# Patient Record
Sex: FEMALE | Race: WHITE | NOT HISPANIC OR LATINO | Employment: FULL TIME | URBAN - METROPOLITAN AREA
[De-identification: names, ages, dates, MRNs, and addresses within clinical notes are randomized per-mention and may not be internally consistent; named-entity substitution may affect disease eponyms.]

---

## 2017-02-08 ENCOUNTER — OFFICE VISIT (OUTPATIENT)
Dept: OBSTETRICS AND GYNECOLOGY | Facility: CLINIC | Age: 47
End: 2017-02-08

## 2017-02-08 VITALS
SYSTOLIC BLOOD PRESSURE: 121 MMHG | BODY MASS INDEX: 34.16 KG/M2 | HEART RATE: 78 BPM | HEIGHT: 62 IN | WEIGHT: 185.6 LBS | DIASTOLIC BLOOD PRESSURE: 81 MMHG

## 2017-02-08 DIAGNOSIS — N64.52 BLOODY DISCHARGE FROM RIGHT NIPPLE: Primary | ICD-10-CM

## 2017-02-08 PROCEDURE — 99213 OFFICE O/P EST LOW 20 MIN: CPT | Performed by: OBSTETRICS & GYNECOLOGY

## 2017-02-08 NOTE — PROGRESS NOTES
Subjective   Mary Banda is a 46 y.o. female    CC: Bloody nipple discharge      History of Present Illness  Pt noticed some blood in the right side of her bra a couple years ago. Pt has been told that she has some cysts on her mammogram. Pt states her right breast is itchy and she is still seeing some bloody discharge. Had mammography and right breast ultrasound in : two benign appearing breast cysts noted.  This has been occurring for about 2 wks.  No pain; and she has felt no masses; breast is not tender.    The following portions of the patient's history were reviewed and updated as appropriate: allergies, current medications, past family history, past medical history, past social history, past surgical history and problem list.    Review of Systems   Constitutional: Negative for fever.   Cardiovascular: Negative for chest pain.   Musculoskeletal: Negative for back pain.       No past medical history on file.  Past Surgical History   Procedure Laterality Date   • Cholecystectomy     • Tubal abdominal ligation     • Oophorectomy Left      OB History      Para Term  AB TAB SAB Ectopic Multiple Living    5 2   2  2   2        Menstrual History:  OB History      Para Term  AB TAB SAB Ectopic Multiple Living    5 2   2  2   2         No LMP recorded. Patient has had an ablation.       Family History   Problem Relation Age of Onset   • Thyroid disease Mother    • Heart disease Mother    • Stroke Father    • Hyperlipidemia Father    • Heart disease Father    • Diabetes Father    • Lupus Sister    • Cancer Maternal Grandmother    • Heart disease Maternal Grandmother    • Heart disease Maternal Grandfather    • Heart disease Paternal Grandmother    • Aneurysm Sister      History   Smoking Status   • Current Every Day Smoker   • Packs/day: 0.50   Smokeless Tobacco   • Never Used     History   Alcohol Use   • Yes     Comment: social       Objective   Physical Exam   Constitutional: She  is oriented to person, place, and time. She appears well-developed and well-nourished.   Pulmonary/Chest: She exhibits no tenderness. Right breast exhibits nipple discharge. Right breast exhibits no inverted nipple, no mass, no skin change and no tenderness. Left breast exhibits no inverted nipple, no mass, no nipple discharge, no skin change and no tenderness. Breasts are symmetrical.   Right nipple discharge today, just barely heme stained.  Collected sample and sent for pap.   Neurological: She is alert and oriented to person, place, and time.   Psychiatric: She has a normal mood and affect. Her behavior is normal. Judgment and thought content normal.   Nursing note and vitals reviewed.        Assessment/Plan   Mary was seen today for breast problem.    Diagnoses and all orders for this visit:    Bloody discharge from right nipple: sample of d/c sent for pap.  -     Mammo Diagnostic Right With CAD      Will call with results.  Refer prn.

## 2017-02-10 LAB
CYTOLOGIST CVX/VAG CYTO: NORMAL
DX ICD CODE: NORMAL
PATH REPORT.FINAL DX SPEC: NORMAL
PATHOLOGIST NAME: NORMAL

## 2017-02-13 DIAGNOSIS — N64.52 BLOODY DISCHARGE FROM NIPPLE: Primary | ICD-10-CM

## 2017-02-14 ENCOUNTER — TELEPHONE (OUTPATIENT)
Dept: OBSTETRICS AND GYNECOLOGY | Facility: CLINIC | Age: 47
End: 2017-02-14

## 2017-02-14 NOTE — TELEPHONE ENCOUNTER
----- Message from Indira Bell sent at 2/13/2017  4:02 PM EST -----  Contact: pt  Pt is inquiring about lab results from last week. ERUM

## 2017-02-15 ENCOUNTER — OFFICE VISIT (OUTPATIENT)
Dept: FAMILY MEDICINE CLINIC | Facility: CLINIC | Age: 47
End: 2017-02-15

## 2017-02-15 VITALS
SYSTOLIC BLOOD PRESSURE: 110 MMHG | BODY MASS INDEX: 33.49 KG/M2 | OXYGEN SATURATION: 96 % | HEIGHT: 62 IN | TEMPERATURE: 100.3 F | WEIGHT: 182 LBS | HEART RATE: 110 BPM | DIASTOLIC BLOOD PRESSURE: 70 MMHG

## 2017-02-15 DIAGNOSIS — J10.1 INFLUENZA A: Primary | ICD-10-CM

## 2017-02-15 LAB
EXPIRATION DATE: ABNORMAL
FLUAV AG NPH QL: POSITIVE
FLUBV AG NPH QL: NEGATIVE
INTERNAL CONTROL: ABNORMAL
Lab: ABNORMAL

## 2017-02-15 PROCEDURE — 99213 OFFICE O/P EST LOW 20 MIN: CPT | Performed by: PHYSICIAN ASSISTANT

## 2017-02-15 PROCEDURE — 87804 INFLUENZA ASSAY W/OPTIC: CPT | Performed by: PHYSICIAN ASSISTANT

## 2017-02-15 RX ORDER — CETIRIZINE HYDROCHLORIDE 10 MG/1
10 TABLET ORAL DAILY
Qty: 30 TABLET | Refills: 0 | Status: SHIPPED | OUTPATIENT
Start: 2017-02-15 | End: 2017-03-06 | Stop reason: ALTCHOICE

## 2017-02-15 RX ORDER — GUAIFENESIN AND DEXTROMETHORPHAN HYDROBROMIDE 600; 30 MG/1; MG/1
1 TABLET, EXTENDED RELEASE ORAL 2 TIMES DAILY
Qty: 45 TABLET | Refills: 0 | OUTPATIENT
Start: 2017-02-15 | End: 2017-03-02

## 2017-02-15 RX ORDER — OSELTAMIVIR PHOSPHATE 75 MG/1
75 CAPSULE ORAL 2 TIMES DAILY
Qty: 10 CAPSULE | Refills: 0 | OUTPATIENT
Start: 2017-02-15 | End: 2017-03-02

## 2017-02-15 RX ORDER — AMOXICILLIN 875 MG/1
875 TABLET, COATED ORAL 2 TIMES DAILY
Qty: 20 TABLET | Refills: 0 | Status: SHIPPED | OUTPATIENT
Start: 2017-02-15 | End: 2017-03-06

## 2017-02-15 RX ORDER — FLUTICASONE PROPIONATE 50 MCG
2 SPRAY, SUSPENSION (ML) NASAL DAILY
Qty: 1 EACH | Refills: 3 | Status: SHIPPED | OUTPATIENT
Start: 2017-02-15 | End: 2018-03-01 | Stop reason: SDUPTHER

## 2017-02-15 NOTE — PROGRESS NOTES
Subjective   Mary Banda is a 46 y.o. female here today with fever, cough,body aches , bilateral earache, sinus congestion,  started on Monday,  diagnosed with the flu 3 weeks ago    History of Present Illness     Started 2/13 evening with body aches- worse in legs. STARTED WITH COUGH AND TIGHT BURNING CHEST. THEN 2/14- FEVER AND BODY ACHES. HAS BEEN WORKING IN NURSING HOME. PRODUCTIVE COUGH- WHITISH SPUTUM - WORSE WHEN LAYING DOWN. PAIN IS GENERALIZED AND CONSTANT 7-8/10 AT THE WORST. HAS BEEN TOO FATIGUED TO TAKE ANYTHING. TOOK COUGH MEDICATION THE 1ST DAY BUT TOO FATIGUED TO GET ANYTHING ELSE. NO N/V/D- GOOD APPETITE AND NORMAL VOID/STOOL. RIGHT EAR PAIN.     The following portions of the patient's history were reviewed and updated as appropriate: allergies, current medications, past family history, past medical history, past social history, past surgical history and problem list.    Review of Systems   Constitutional: Positive for chills and fever.        Body aches   HENT: Positive for congestion, ear pain (bilateral) and sinus pressure.    Respiratory: Positive for cough, shortness of breath and wheezing.    Neurological: Positive for headaches.   All other systems reviewed and are negative.      Objective   Physical Exam   Constitutional: She is oriented to person, place, and time. Vital signs are normal. She appears well-developed and well-nourished.   HENT:   Head: Normocephalic and atraumatic.   Right Ear: External ear and ear canal normal. Tympanic membrane is injected. Tympanic membrane is not erythematous. A middle ear effusion is present.   Left Ear: Tympanic membrane, external ear and ear canal normal.   Nose: Nose normal.   Mouth/Throat: Uvula is midline and mucous membranes are normal. Posterior oropharyngeal erythema present.   Eyes: Conjunctivae are normal.   Neck: Neck supple.   Cardiovascular: Normal rate, regular rhythm and normal heart sounds.  Exam reveals no gallop and no friction  rub.    No murmur heard.  Pulmonary/Chest: Effort normal and breath sounds normal. She has no wheezes. She has no rhonchi. She has no rales.   Neurological: She is alert and oriented to person, place, and time.   Skin: Skin is warm and dry.   Psychiatric: She has a normal mood and affect. Her speech is normal and behavior is normal. Judgment and thought content normal. Cognition and memory are normal.   Nursing note and vitals reviewed.      Assessment/Plan   Mary was seen today for cough, fever, generalized body aches, headache and earache.    Diagnoses and all orders for this visit:    Influenza A  -     POCT Influenza A/B  -     oseltamivir (TAMIFLU) 75 MG capsule; Take 1 capsule by mouth 2 (Two) Times a Day.  -     amoxicillin (AMOXIL) 875 MG tablet; Take 1 tablet by mouth 2 (Two) Times a Day.  -     cetirizine (zyrTEC) 10 MG tablet; Take 1 tablet by mouth Daily.  -     guaifenesin-dextromethorphan (MUCINEX DM)  MG tablet sustained-release 12 hour tablet; Take 1 tablet by mouth 2 (Two) Times a Day.  -     fluticasone (FLONASE) 50 MCG/ACT nasal spray; 2 sprays into each nostril Daily.      Patient Instructions   46 YEAR OLD FEMALE WHO PRESENTS TODAY WITH INFLUENZA A FOR 2 DAYS. PATIENT TO TAKE TAMIFLU TWICE DAILY FOR 5 DAYS, MUCINEX DM TWICE DAILY, ZYRTEC AND FLONASE ONCE DAILY. SHE HAS MILD RIGHT AOM ON EXAM. I WILL GIVE AMOXIL TWICE DAILY FOR 10 DAYS. IF  NO IMPROVEMENT IN EAR WITH SYMPTOM TREATMENT, I WILL HAVE HER TAKE AMOXIL UNTIL COMPLETION. FOLLOW UP ASAP IF WORSENING OR NEW SYMPTOMS OR NO RESOLUTION OF SYMPTOMS.

## 2017-02-16 ENCOUNTER — APPOINTMENT (OUTPATIENT)
Dept: MAMMOGRAPHY | Facility: HOSPITAL | Age: 47
End: 2017-02-16

## 2017-02-21 NOTE — PATIENT INSTRUCTIONS
46 YEAR OLD FEMALE WHO PRESENTS TODAY WITH INFLUENZA A FOR 2 DAYS. PATIENT TO TAKE TAMIFLU TWICE DAILY FOR 5 DAYS, MUCINEX DM TWICE DAILY, ZYRTEC AND FLONASE ONCE DAILY. SHE HAS MILD RIGHT AOM ON EXAM. I WILL GIVE AMOXIL TWICE DAILY FOR 10 DAYS. IF  NO IMPROVEMENT IN EAR WITH SYMPTOM TREATMENT, I WILL HAVE HER TAKE AMOXIL UNTIL COMPLETION. FOLLOW UP ASAP IF WORSENING OR NEW SYMPTOMS OR NO RESOLUTION OF SYMPTOMS.

## 2017-02-24 ENCOUNTER — APPOINTMENT (OUTPATIENT)
Dept: GENERAL RADIOLOGY | Facility: HOSPITAL | Age: 47
End: 2017-02-24

## 2017-02-24 ENCOUNTER — HOSPITAL ENCOUNTER (EMERGENCY)
Facility: HOSPITAL | Age: 47
Discharge: HOME | End: 2017-02-24
Attending: EMERGENCY MEDICINE | Admitting: EMERGENCY MEDICINE

## 2017-02-24 VITALS
SYSTOLIC BLOOD PRESSURE: 109 MMHG | BODY MASS INDEX: 30.39 KG/M2 | WEIGHT: 178 LBS | DIASTOLIC BLOOD PRESSURE: 58 MMHG | RESPIRATION RATE: 16 BRPM | TEMPERATURE: 97.2 F | HEART RATE: 83 BPM | OXYGEN SATURATION: 97 % | HEIGHT: 64 IN

## 2017-02-24 DIAGNOSIS — M54.50 ACUTE EXACERBATION OF CHRONIC LOW BACK PAIN: Primary | ICD-10-CM

## 2017-02-24 DIAGNOSIS — G89.29 ACUTE EXACERBATION OF CHRONIC LOW BACK PAIN: Primary | ICD-10-CM

## 2017-02-24 LAB
B-HCG UR QL: NEGATIVE
BACTERIA UR QL AUTO: ABNORMAL /HPF
BILIRUB UR QL STRIP: NEGATIVE
CLARITY UR: CLEAR
COLOR UR: YELLOW
GLUCOSE UR STRIP-MCNC: NEGATIVE MG/DL
HGB UR QL STRIP.AUTO: ABNORMAL
HYALINE CASTS UR QL AUTO: ABNORMAL /LPF
KETONES UR QL STRIP: NEGATIVE
LEUKOCYTE ESTERASE UR QL STRIP.AUTO: ABNORMAL
NITRITE UR QL STRIP: NEGATIVE
PH UR STRIP.AUTO: 6 [PH] (ref 5–8)
PROT UR QL STRIP: NEGATIVE
RBC # UR: ABNORMAL /HPF
REF LAB TEST METHOD: ABNORMAL
SP GR UR STRIP: <=1.005 (ref 1–1.03)
SQUAMOUS #/AREA URNS HPF: ABNORMAL /HPF
UROBILINOGEN UR QL STRIP: ABNORMAL
WBC UR QL AUTO: ABNORMAL /HPF

## 2017-02-24 PROCEDURE — 81001 URINALYSIS AUTO W/SCOPE: CPT | Performed by: PHYSICIAN ASSISTANT

## 2017-02-24 PROCEDURE — 72110 X-RAY EXAM L-2 SPINE 4/>VWS: CPT

## 2017-02-24 PROCEDURE — 96376 TX/PRO/DX INJ SAME DRUG ADON: CPT

## 2017-02-24 PROCEDURE — 99283 EMERGENCY DEPT VISIT LOW MDM: CPT

## 2017-02-24 PROCEDURE — 25010000002 DIAZEPAM PER 5 MG: Performed by: EMERGENCY MEDICINE

## 2017-02-24 PROCEDURE — 25010000002 DEXAMETHASONE SODIUM PHOSPHATE 20 MG/5ML SOLUTION: Performed by: EMERGENCY MEDICINE

## 2017-02-24 PROCEDURE — 96374 THER/PROPH/DIAG INJ IV PUSH: CPT

## 2017-02-24 PROCEDURE — 96375 TX/PRO/DX INJ NEW DRUG ADDON: CPT

## 2017-02-24 PROCEDURE — 25010000002 MORPHINE PER 10 MG: Performed by: EMERGENCY MEDICINE

## 2017-02-24 PROCEDURE — 81025 URINE PREGNANCY TEST: CPT | Performed by: PHYSICIAN ASSISTANT

## 2017-02-24 RX ORDER — SODIUM CHLORIDE 0.9 % (FLUSH) 0.9 %
10 SYRINGE (ML) INJECTION AS NEEDED
Status: DISCONTINUED | OUTPATIENT
Start: 2017-02-24 | End: 2017-02-24 | Stop reason: HOSPADM

## 2017-02-24 RX ORDER — HYDROCODONE BITARTRATE AND ACETAMINOPHEN 5; 325 MG/1; MG/1
1 TABLET ORAL EVERY 6 HOURS PRN
Qty: 12 TABLET | Refills: 0 | Status: SHIPPED | OUTPATIENT
Start: 2017-02-24 | End: 2017-03-06

## 2017-02-24 RX ORDER — DEXAMETHASONE SODIUM PHOSPHATE 4 MG/ML
4 INJECTION, SOLUTION INTRA-ARTICULAR; INTRALESIONAL; INTRAMUSCULAR; INTRAVENOUS; SOFT TISSUE ONCE
Status: COMPLETED | OUTPATIENT
Start: 2017-02-24 | End: 2017-02-24

## 2017-02-24 RX ORDER — MORPHINE SULFATE 2 MG/ML
2 INJECTION, SOLUTION INTRAMUSCULAR; INTRAVENOUS ONCE
Status: COMPLETED | OUTPATIENT
Start: 2017-02-24 | End: 2017-02-24

## 2017-02-24 RX ORDER — DIAZEPAM 5 MG/ML
5 INJECTION, SOLUTION INTRAMUSCULAR; INTRAVENOUS ONCE
Status: COMPLETED | OUTPATIENT
Start: 2017-02-24 | End: 2017-02-24

## 2017-02-24 RX ORDER — CYCLOBENZAPRINE HCL 10 MG
10 TABLET ORAL 3 TIMES DAILY PRN
Qty: 21 TABLET | Refills: 0 | Status: SHIPPED | OUTPATIENT
Start: 2017-02-24 | End: 2017-03-06 | Stop reason: DRUGHIGH

## 2017-02-24 RX ADMIN — MORPHINE SULFATE 4 MG: 4 INJECTION, SOLUTION INTRAMUSCULAR; INTRAVENOUS at 09:26

## 2017-02-24 RX ADMIN — MORPHINE SULFATE 2 MG: 2 INJECTION, SOLUTION INTRAMUSCULAR; INTRAVENOUS at 08:26

## 2017-02-24 RX ADMIN — DIAZEPAM 5 MG: 5 INJECTION, SOLUTION INTRAMUSCULAR; INTRAVENOUS at 08:31

## 2017-02-24 RX ADMIN — DEXAMETHASONE SODIUM PHOSPHATE 4 MG: 4 INJECTION, SOLUTION INTRAMUSCULAR; INTRAVENOUS at 08:34

## 2017-02-27 ENCOUNTER — TELEPHONE (OUTPATIENT)
Dept: SOCIAL WORK | Facility: HOSPITAL | Age: 47
End: 2017-02-27

## 2017-02-27 NOTE — TELEPHONE ENCOUNTER
Spoke with pt today in f/u and she says her back is somewhat better. She was able to get her script filled for the Norco and is taking them as directed and they help. She is waiting for Dr. Lechuga's office to call her back with an appt and she has a f/u appt with her APRN in 1 week. No other questions or concerns voiced by pt at this time. Nataly NEFF

## 2017-03-01 ENCOUNTER — HOSPITAL ENCOUNTER (OUTPATIENT)
Dept: MAMMOGRAPHY | Facility: HOSPITAL | Age: 47
Discharge: HOME | End: 2017-03-01
Admitting: OBSTETRICS & GYNECOLOGY

## 2017-03-01 ENCOUNTER — HOSPITAL ENCOUNTER (OUTPATIENT)
Dept: ULTRASOUND IMAGING | Facility: HOSPITAL | Age: 47
Discharge: HOME | End: 2017-03-01

## 2017-03-01 DIAGNOSIS — N64.52 BLOODY DISCHARGE FROM RIGHT NIPPLE: ICD-10-CM

## 2017-03-01 PROCEDURE — G0206 DX MAMMO INCL CAD UNI: HCPCS

## 2017-03-01 PROCEDURE — 76642 ULTRASOUND BREAST LIMITED: CPT

## 2017-03-02 DIAGNOSIS — N64.52 BLOODY DISCHARGE FROM RIGHT NIPPLE: Primary | ICD-10-CM

## 2017-03-03 ENCOUNTER — OFFICE VISIT (OUTPATIENT)
Dept: MAMMOGRAPHY | Facility: CLINIC | Age: 47
End: 2017-03-03

## 2017-03-03 VITALS
HEIGHT: 63 IN | HEART RATE: 103 BPM | BODY MASS INDEX: 33.13 KG/M2 | OXYGEN SATURATION: 98 % | SYSTOLIC BLOOD PRESSURE: 145 MMHG | WEIGHT: 187 LBS | DIASTOLIC BLOOD PRESSURE: 80 MMHG | TEMPERATURE: 98 F

## 2017-03-03 DIAGNOSIS — N64.52 BLOODY DISCHARGE FROM RIGHT NIPPLE: ICD-10-CM

## 2017-03-03 DIAGNOSIS — N63.0 LUMP OR MASS IN BREAST: Primary | ICD-10-CM

## 2017-03-03 PROCEDURE — 99204 OFFICE O/P NEW MOD 45 MIN: CPT | Performed by: SURGERY

## 2017-03-03 RX ORDER — HYDROCHLOROTHIAZIDE 12.5 MG/1
12.5 CAPSULE, GELATIN COATED ORAL DAILY PRN
COMMUNITY
End: 2017-11-14 | Stop reason: SDUPTHER

## 2017-03-03 RX ORDER — DOCUSATE SODIUM 100 MG/1
100 CAPSULE, LIQUID FILLED ORAL 2 TIMES DAILY
COMMUNITY
End: 2017-03-06

## 2017-03-03 RX ORDER — IBUPROFEN 200 MG
200 TABLET ORAL EVERY 6 HOURS PRN
COMMUNITY
End: 2017-03-06

## 2017-03-03 NOTE — PROGRESS NOTES
Chief Complaint: Mary Banda is a 46 y.o.. female here today for Breast Discharge (bloody nipple discharge)        History of Present Illness:  Patient presents with breast mass and nipple discharge from the right breast.  For approximately 1 year the patient has noted some brownish drainage from the right nipple that was spontaneous.  Approximately 1 month ago when she took her bra off there was clearly some blood in the bra covering a 2 cm area.  She has had some imaging studies and has noted some continued drainage with these.  Her imaging studies have suggested multiple ducts with some debris within them.  There is a 1.1 cm intraductal collection that did not have any clear-cut blood flow in it.  The radiologists have suggested an MRI to help us target a more specific area.  The patient has no family history for breast cancer and has not taken any hormone replacement therapy.  I have reviewed her imaging studies and agree with the findings.      Review of Systems:  Review of Systems   Constitutional: Positive for unexpected weight change.        10 pound weight gain over 6 months   Eyes: Positive for eye problems.        Floaters and increased pressure   Cardiovascular: Positive for palpitations.   Gastrointestinal: Positive for constipation.   Musculoskeletal: Positive for back pain and neck pain.   Skin: Positive for itching.        Breasts are itchy   Hematological: Bruises/bleeds easily.   All other systems reviewed and are negative.       Past Medical and Surgical History:  Breast Biopsy History:  Patient has had the following breast biopsies:Bilateral cyst aspiriation 2014- no path run  Breast Cancer HIstory:  Patient does not have a past medical history of breast cancer.  Breast Operations, and year:  Bilat breast cyst aspiration 2014    History   Smoking Status   • Current Every Day Smoker   • Packs/day: 0.50   • Types: Cigarettes   • Start date: 1991   Smokeless Tobacco   • Never Used     Obstetric  History:  Patient does not menstruate, due to an ablation in the following year:2006   Number of pregnancies:5  Number of live births: 2  Number of abortions or miscarriages: 3 (2 SAB 1 AB)  Age of delivery of first child: 23  Patient did not breast feed.  Length of time taking birth control pills:5 years  Patient has never taken hormone replacement    Past Surgical History   Procedure Laterality Date   • Cholecystectomy     • Tubal abdominal ligation     • Oophorectomy Left    • Endometrial ablation w/ noveliot  2006       History reviewed. No pertinent past medical history.    Prior Hospitalizations, other than for surgery or childbirth, and year:  Pyelonephritis 2007    Social History:  Patient is .  Patient has 1 daughters. and Patient has 1 sons.    Family History:  Family History   Problem Relation Age of Onset   • Thyroid disease Mother    • Heart disease Mother    • Depression Mother    • Hypothyroidism Mother    • Stroke Father    • Hyperlipidemia Father    • Heart disease Father    • Diabetes Father    • Heart attack Father    • Lupus Sister    • Cancer Maternal Grandmother    • Heart disease Maternal Grandmother    • Alcohol abuse Maternal Grandmother    • Lung cancer Maternal Grandmother    • Heart disease Maternal Grandfather    • Heart attack Maternal Grandfather    • Heart disease Paternal Grandmother    • Lung cancer Paternal Grandmother    • Aneurysm Sister    • Depression Daughter    • Hypertension Daughter    • Hypothyroidism Daughter    • Diabetes Paternal Aunt    • Diabetes Paternal Uncle        Vital Signs:  Vitals:    03/03/17 1113   BP: 145/80   Pulse: 103   Temp: 98 °F (36.7 °C)   SpO2: 98%       Medications:    Current Outpatient Prescriptions:   •  Prenatal Vit-Min-FA-Fish Oil ([employer] PRENATAL GUMMY PO), Take 2 tablets by mouth daily., Disp: , Rfl:      Physical Examination:  General Appearance:   Patient is in no distress.  She is well kept and has an overweight build.    Psychiatric:  Patient with appropriate mood and affect. Alert and oriented to self, time, and place.    Breast, RIGHT:  medium sized, symmetric with the contralateral side.  Breast skin is without erythema, edema, rashes.  There are no visible abnormalities upon inspection during the arm-raising maneuver or with hands on hips in the sitting position. There is no nipple retraction or nipple/areolar skin changes.I was able to express a little bit of yellowish fluid from a single duct in the 12 o'clock position.  There are no masses palpable in the sitting or supine positions.    Breast, LEFT:  medium sized, symmetric with the contralateral side.  Breast skin is without erythema, edema, rashes.  There are no visible abnormalities upon inspection during the arm-raising maneuver or with hands on hips in the sitting position. There is no nipple retraction, discharge or nipple/areolar skin changes.There are no masses palpable in the sitting or supine positions.    Lymphatic:  There is no axillary, cervical, infraclavicular, or supraclavicular adenopathy bilaterally.  Eyes:  Pupils are round and reactive to light.  Cardiovascular:  Heart rate and rhythm are regular.  Respiratory:  Lungs are clear bilaterally with no crackles or wheezes in any lung field.  Gastrointestinal:  Abdomen is soft, nondistended, and nontender.  There is no evidence of hepatosplenomegaly.There are no scars from previous surgery.    Musculoskeletal:  Good strength in all 4 extremities.   There is good range of motion in both shoulders.    Skin:  No new skin lesions or rashes on the skin excluding the breast (see breast exam above).    Assessment:  Diagnoses and all orders for this visit:    Lump or mass in breast  -     MRI Breast Bilateral With & Without Contrast; Future    Bloody discharge from right nipple    Other orders  -     hydrochlorothiazide (MICROZIDE) 12.5 MG capsule; Take 12.5 mg by mouth Daily.  -     ibuprofen (ADVIL,MOTRIN) 200 MG  tablet; Take 200 mg by mouth Every 6 (Six) Hours As Needed.  -     docusate sodium (COLACE) 100 MG capsule; Take 100 mg by mouth 2 (Two) Times a Day.  -     Loratadine (CLARITIN PO); Take  by mouth.      Plan:  We have discussed her situation.  Clearly she has spontaneous bloody discharge from what appears to be a single duct in the right breast.  Her imaging studies are suggestive of a possible mass but the radiologist is uncertain whether this just represents blood products which may be obscuring an underlying intraductal papilloma.  An MRI was suggested and I think that is the best way to start.  If the insurance company will not approve the MRI I think we need to proceed with an ultrasound-guided biopsy of this area although that is not the most ideal course.      CPT coding:    Next Appointment:  No Follow-up on file.

## 2017-03-06 ENCOUNTER — TELEPHONE (OUTPATIENT)
Dept: OBSTETRICS AND GYNECOLOGY | Facility: CLINIC | Age: 47
End: 2017-03-06

## 2017-03-06 ENCOUNTER — OFFICE VISIT (OUTPATIENT)
Dept: FAMILY MEDICINE CLINIC | Facility: CLINIC | Age: 47
End: 2017-03-06

## 2017-03-06 ENCOUNTER — OFFICE VISIT (OUTPATIENT)
Dept: NEUROSURGERY | Facility: CLINIC | Age: 47
End: 2017-03-06

## 2017-03-06 VITALS
DIASTOLIC BLOOD PRESSURE: 70 MMHG | RESPIRATION RATE: 16 BRPM | BODY MASS INDEX: 32.78 KG/M2 | SYSTOLIC BLOOD PRESSURE: 130 MMHG | HEIGHT: 63 IN | WEIGHT: 185 LBS | HEART RATE: 88 BPM

## 2017-03-06 VITALS
BODY MASS INDEX: 33.06 KG/M2 | SYSTOLIC BLOOD PRESSURE: 132 MMHG | DIASTOLIC BLOOD PRESSURE: 74 MMHG | HEART RATE: 106 BPM | WEIGHT: 186.6 LBS | HEIGHT: 63 IN | TEMPERATURE: 98.2 F | OXYGEN SATURATION: 99 %

## 2017-03-06 DIAGNOSIS — G89.29 CHRONIC LEFT-SIDED LOW BACK PAIN WITHOUT SCIATICA: Primary | ICD-10-CM

## 2017-03-06 DIAGNOSIS — M54.50 CHRONIC LEFT-SIDED LOW BACK PAIN WITHOUT SCIATICA: Primary | ICD-10-CM

## 2017-03-06 DIAGNOSIS — M54.50 ACUTE BILATERAL LOW BACK PAIN WITHOUT SCIATICA: Primary | ICD-10-CM

## 2017-03-06 PROCEDURE — 99213 OFFICE O/P EST LOW 20 MIN: CPT | Performed by: NURSE PRACTITIONER

## 2017-03-06 PROCEDURE — 99204 OFFICE O/P NEW MOD 45 MIN: CPT | Performed by: NURSE PRACTITIONER

## 2017-03-06 RX ORDER — IBUPROFEN 800 MG/1
800 TABLET ORAL AS NEEDED
COMMUNITY
End: 2021-03-01

## 2017-03-06 RX ORDER — CYCLOBENZAPRINE HCL 10 MG
10 TABLET ORAL 3 TIMES DAILY PRN
Qty: 60 TABLET | Refills: 1 | Status: SHIPPED | OUTPATIENT
Start: 2017-03-06 | End: 2017-11-14 | Stop reason: DRUGHIGH

## 2017-03-06 NOTE — TELEPHONE ENCOUNTER
----- Message from Khloe Cornelius MA sent at 3/6/2017  4:03 PM EST -----  Spoke with pt, She is seeing Dr. Emerson regarding this and they are getting her scheduled for a breast MRI.  ----- Message -----     From: Riya Madrigal MD     Sent: 3/2/2017   2:44 PM       To: Khloe Cornelius MA    Please call the patient regarding her abnormal result.  Please schedule her for breast MRI.
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
## 2017-03-09 ENCOUNTER — HOSPITAL ENCOUNTER (OUTPATIENT)
Dept: MRI IMAGING | Facility: HOSPITAL | Age: 47
Discharge: HOME | End: 2017-03-09
Attending: SURGERY | Admitting: SURGERY

## 2017-03-09 DIAGNOSIS — N63.0 LUMP OR MASS IN BREAST: ICD-10-CM

## 2017-03-09 PROCEDURE — A9577 INJ MULTIHANCE: HCPCS | Performed by: SURGERY

## 2017-03-09 PROCEDURE — 0 GADOBENATE DIMEGLUMINE 529 MG/ML SOLUTION: Performed by: SURGERY

## 2017-03-09 PROCEDURE — 0159T HC MRI BREAST COMPUTER ANALYSIS: CPT

## 2017-03-09 PROCEDURE — C8908 MRI W/O FOL W/CONT, BREAST,: HCPCS

## 2017-03-09 RX ADMIN — GADOBENATE DIMEGLUMINE 17 ML: 529 INJECTION, SOLUTION INTRAVENOUS at 16:50

## 2017-03-13 ENCOUNTER — TELEPHONE (OUTPATIENT)
Dept: MAMMOGRAPHY | Facility: CLINIC | Age: 47
End: 2017-03-13

## 2017-03-13 NOTE — TELEPHONE ENCOUNTER
Pt called looking for her MRI results. We told her Dr. Emerson is out of town but we will try and get a hold of Dr. Fitzpatrick to look over the results and giver her a call. She was ok with this information.      Called Dr. Fitzpatrick's office and he called back shortly after they paged him. He said he would gladly take a look at it tomorrow and giver her a call.     Relayed this information back to the patient and she was understanding.     Ilda Stevens RN

## 2017-03-14 DIAGNOSIS — N63.10 BREAST MASS, RIGHT: Primary | ICD-10-CM

## 2017-03-20 ENCOUNTER — TELEPHONE (OUTPATIENT)
Dept: MAMMOGRAPHY | Facility: CLINIC | Age: 47
End: 2017-03-20

## 2017-03-20 NOTE — TELEPHONE ENCOUNTER
Her recent MRI does not show any suspicious enhancement.  Behind the areola there is no evidence of intraductal hemorrhage therefore the radiologist recommended an ultrasound-guided biopsy of the intraductal mass.  Those orders were placed on 3/14/2017 but the patient has not received a call.  We will follow-up on that.

## 2017-03-21 DIAGNOSIS — N63.10 BREAST MASS, RIGHT: Primary | ICD-10-CM

## 2017-03-27 ENCOUNTER — HOSPITAL ENCOUNTER (OUTPATIENT)
Dept: ULTRASOUND IMAGING | Facility: HOSPITAL | Age: 47
Discharge: HOME | End: 2017-03-27
Attending: SURGERY | Admitting: SURGERY

## 2017-03-27 VITALS
WEIGHT: 180 LBS | OXYGEN SATURATION: 100 % | SYSTOLIC BLOOD PRESSURE: 151 MMHG | HEIGHT: 62 IN | RESPIRATION RATE: 16 BRPM | BODY MASS INDEX: 33.13 KG/M2 | TEMPERATURE: 98.2 F | DIASTOLIC BLOOD PRESSURE: 91 MMHG | HEART RATE: 98 BPM

## 2017-03-27 DIAGNOSIS — N63.10 BREAST MASS, RIGHT: ICD-10-CM

## 2017-03-27 PROCEDURE — 88305 TISSUE EXAM BY PATHOLOGIST: CPT | Performed by: SURGERY

## 2017-03-27 RX ADMIN — SODIUM BICARBONATE 10 ML: 84 INJECTION, SOLUTION INTRAVENOUS at 15:05

## 2017-03-29 LAB
CYTO UR: NORMAL
LAB AP CASE REPORT: NORMAL
Lab: NORMAL
PATH REPORT.FINAL DX SPEC: NORMAL
PATH REPORT.GROSS SPEC: NORMAL

## 2017-03-30 ENCOUNTER — TELEPHONE (OUTPATIENT)
Dept: MAMMOGRAPHY | Facility: CLINIC | Age: 47
End: 2017-03-30

## 2017-03-30 NOTE — TELEPHONE ENCOUNTER
I told her the path report reveals some non-atypical duct hyperplasia and otherwise benign breast tissue.  This could be concordant with the imaging findings but I want to be careful with her.  I don't think we have anything pushing us to perform an excisional biopsy at this point in time.  I have asked her to let me reevaluate her in 3 months and if everything is stable, we will repeat her imaging studies in 6 months.

## 2017-04-27 ENCOUNTER — HOSPITAL ENCOUNTER (OUTPATIENT)
Dept: MRI IMAGING | Facility: HOSPITAL | Age: 47
Discharge: HOME | End: 2017-04-27
Admitting: NURSE PRACTITIONER

## 2017-04-27 DIAGNOSIS — M54.50 ACUTE BILATERAL LOW BACK PAIN WITHOUT SCIATICA: ICD-10-CM

## 2017-04-27 PROCEDURE — 72148 MRI LUMBAR SPINE W/O DYE: CPT

## 2017-05-17 ENCOUNTER — OFFICE VISIT (OUTPATIENT)
Dept: NEUROSURGERY | Facility: CLINIC | Age: 47
End: 2017-05-17

## 2017-05-17 VITALS
WEIGHT: 185 LBS | HEART RATE: 88 BPM | DIASTOLIC BLOOD PRESSURE: 64 MMHG | RESPIRATION RATE: 16 BRPM | BODY MASS INDEX: 33.84 KG/M2 | SYSTOLIC BLOOD PRESSURE: 132 MMHG

## 2017-05-17 DIAGNOSIS — M54.50 CHRONIC LEFT-SIDED LOW BACK PAIN WITHOUT SCIATICA: Primary | ICD-10-CM

## 2017-05-17 DIAGNOSIS — G89.29 CHRONIC LEFT-SIDED LOW BACK PAIN WITHOUT SCIATICA: Primary | ICD-10-CM

## 2017-05-17 PROCEDURE — 99213 OFFICE O/P EST LOW 20 MIN: CPT | Performed by: NURSE PRACTITIONER

## 2017-07-13 ENCOUNTER — OFFICE VISIT (OUTPATIENT)
Dept: MAMMOGRAPHY | Facility: CLINIC | Age: 47
End: 2017-07-13

## 2017-07-13 VITALS
HEART RATE: 79 BPM | OXYGEN SATURATION: 98 % | DIASTOLIC BLOOD PRESSURE: 72 MMHG | SYSTOLIC BLOOD PRESSURE: 128 MMHG | TEMPERATURE: 97.5 F

## 2017-07-13 DIAGNOSIS — N64.52 NIPPLE DISCHARGE: Primary | ICD-10-CM

## 2017-07-13 PROCEDURE — 99213 OFFICE O/P EST LOW 20 MIN: CPT | Performed by: SURGERY

## 2017-07-13 NOTE — PROGRESS NOTES
Subjective   Mary Banda is a 47 y.o. female     History of Present Illness she presents today with recurrent bloody discharge from the right nipple.  I last saw her in March 2017.  At that time she was having some spontaneous but fairly infrequent bloody discharge from the right nipple.  Her imaging studies suggested an intraductal collection of fluid that had no blood flow within it.  An MRI was suggested and obtained.  It did not show any suspicious enhancement but an ultrasound-guided biopsy was recommended.  That was performed and revealed some non-atypical duct hyperplasia but no evidence of an intraductal papilloma or malignancy.  An ultrasound in 4-6 months was recommended and follow-up.  The patient had had no discharge until a couple weeks ago when she took her bra off and noted several spots and there.  She had not experienced any trauma to the breast and has no pain except occasional shooting pains that go to the nipple.  I think these are secondary to her biopsy.  The patient has also not detected any changes to the appearance of her breast.        Review of Systems  Past Medical History:   Diagnosis Date   • Low back pain      Past Surgical History:   Procedure Laterality Date   • CHOLECYSTECTOMY     • ENDOMETRIAL ABLATION W/ NOVALIZA  2006   • OOPHORECTOMY Left    • TUBAL ABDOMINAL LIGATION       Family History   Problem Relation Age of Onset   • Thyroid disease Mother    • Heart disease Mother    • Depression Mother    • Hypothyroidism Mother    • Stroke Father    • Hyperlipidemia Father    • Heart disease Father    • Diabetes Father    • Heart attack Father    • Lupus Sister    • Heart disease Maternal Grandmother    • Alcohol abuse Maternal Grandmother    • Lung cancer Maternal Grandmother    • Heart disease Maternal Grandfather    • Heart attack Maternal Grandfather    • Heart disease Paternal Grandmother    • Lung cancer Paternal Grandmother    • Aneurysm Sister    • Depression Daughter    •  Hypertension Daughter    • Hypothyroidism Daughter    • Diabetes Paternal Aunt    • Diabetes Paternal Uncle      Social History     Social History   • Marital status:      Spouse name: N/A   • Number of children: N/A   • Years of education: N/A     Occupational History   • 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     Social History Main Topics   • Smoking status: Current Every Day Smoker     Packs/day: 0.50     Years: 15.00     Types: Cigarettes     Start date: 1991   • Smokeless tobacco: Never Used   • Alcohol use Yes      Comment: social   • Drug use: No   • Sexual activity: Yes     Partners: Male     Birth control/ protection: Surgical     Other Topics Concern   • Not on file     Social History Narrative       Objective   Physical Exam  Vital signs-blood pressure 128/72, pulse 79, temperature 97.5  Gen.-well-nourished, well-developed, white female in no acute distress  Neck-supple without masses or thyromegaly.  Lymphatic-no supraclavicular, cervical, or axillary adenopathy  Heart-regular rate and rhythm without murmur  Lungs-clear to auscultation  Right breast-there is no visible abnormality to the nipple areolar complex.  I do not see any skin dimpling or nipple retraction and there are no palpable abdominal masses.  I could not express any nipple discharge today.    Left breast-there is no skin dimpling or nipple retraction.  I could not express any nipple discharge.  There were no palpable masses in the breast.  Abdomen-soft and nontender without masses or hepatosplenomegaly  Skin-she has extensive tattooing of her upper extremities and some small ones on her chest.    Assessment/Plan   Persistent right nipple discharge without an obvious etiology.  Her physical examination is unremarkable today.  I would like to repeat her ultrasound and I will be calling her after that is back.    The encounter diagnosis was Nipple discharge.

## 2017-07-21 ENCOUNTER — HOSPITAL ENCOUNTER (OUTPATIENT)
Dept: ULTRASOUND IMAGING | Facility: HOSPITAL | Age: 47
Discharge: HOME | End: 2017-07-21
Attending: SURGERY | Admitting: SURGERY

## 2017-07-21 PROCEDURE — 76642 ULTRASOUND BREAST LIMITED: CPT

## 2017-07-25 ENCOUNTER — TELEPHONE (OUTPATIENT)
Dept: MAMMOGRAPHY | Facility: CLINIC | Age: 47
End: 2017-07-25

## 2017-07-26 ENCOUNTER — PREP FOR SURGERY (OUTPATIENT)
Dept: OTHER | Facility: HOSPITAL | Age: 47
End: 2017-07-26

## 2017-07-26 ENCOUNTER — TELEPHONE (OUTPATIENT)
Dept: MAMMOGRAPHY | Facility: CLINIC | Age: 47
End: 2017-07-26

## 2017-07-26 DIAGNOSIS — N63.10 BREAST MASS, RIGHT: Primary | ICD-10-CM

## 2017-07-26 RX ORDER — CEFAZOLIN SODIUM 2 G/100ML
2 INJECTION, SOLUTION INTRAVENOUS ONCE
Status: CANCELLED | OUTPATIENT
Start: 2017-08-09 | End: 2017-07-26

## 2017-07-26 RX ORDER — DIAZEPAM 5 MG/1
10 TABLET ORAL ONCE
Status: CANCELLED | OUTPATIENT
Start: 2017-08-09 | End: 2017-07-26

## 2017-07-26 NOTE — TELEPHONE ENCOUNTER
The recent ultrasound of the right breast shows a 4 mm intraductal mass.  It is felt that a needle localized excisional biopsy of this would be best.  I have discussed this with the patient and she wishes to proceed.  Orders have been placed.

## 2017-07-27 ENCOUNTER — TELEPHONE (OUTPATIENT)
Dept: MAMMOGRAPHY | Facility: CLINIC | Age: 47
End: 2017-07-27

## 2017-07-27 ENCOUNTER — TRANSCRIBE ORDERS (OUTPATIENT)
Dept: ADMINISTRATIVE | Facility: HOSPITAL | Age: 47
End: 2017-07-27

## 2017-07-27 DIAGNOSIS — N63.10 MASS OF RIGHT BREAST: Primary | ICD-10-CM

## 2017-07-27 NOTE — TELEPHONE ENCOUNTER
Voicemail left for patient to discuss surgery date - tentative  Aug 9th at 3 pm. / pending patient confirmation

## 2017-08-01 ENCOUNTER — APPOINTMENT (OUTPATIENT)
Dept: PREADMISSION TESTING | Facility: HOSPITAL | Age: 47
End: 2017-08-01

## 2017-08-01 VITALS
RESPIRATION RATE: 16 BRPM | WEIGHT: 183.13 LBS | OXYGEN SATURATION: 100 % | BODY MASS INDEX: 32.45 KG/M2 | DIASTOLIC BLOOD PRESSURE: 78 MMHG | TEMPERATURE: 97.9 F | SYSTOLIC BLOOD PRESSURE: 119 MMHG | HEIGHT: 63 IN | HEART RATE: 74 BPM

## 2017-08-01 LAB
ANION GAP SERPL CALCULATED.3IONS-SCNC: 12.6 MMOL/L
BUN BLD-MCNC: 10 MG/DL (ref 6–20)
BUN/CREAT SERPL: 13 (ref 7–25)
CALCIUM SPEC-SCNC: 9 MG/DL (ref 8.6–10.5)
CHLORIDE SERPL-SCNC: 102 MMOL/L (ref 98–107)
CO2 SERPL-SCNC: 25.4 MMOL/L (ref 22–29)
CREAT BLD-MCNC: 0.77 MG/DL (ref 0.57–1)
DEPRECATED RDW RBC AUTO: 45.6 FL (ref 37–54)
ERYTHROCYTE [DISTWIDTH] IN BLOOD BY AUTOMATED COUNT: 13.9 % (ref 11.7–13)
GFR SERPL CREATININE-BSD FRML MDRD: 81 ML/MIN/1.73
GLUCOSE BLD-MCNC: 122 MG/DL (ref 65–99)
HCT VFR BLD AUTO: 40.8 % (ref 35.6–45.5)
HGB BLD-MCNC: 13.5 G/DL (ref 11.9–15.5)
MCH RBC QN AUTO: 29.5 PG (ref 26.9–32)
MCHC RBC AUTO-ENTMCNC: 33.1 G/DL (ref 32.4–36.3)
MCV RBC AUTO: 89.3 FL (ref 80.5–98.2)
PLATELET # BLD AUTO: 319 10*3/MM3 (ref 140–500)
PMV BLD AUTO: 10.7 FL (ref 6–12)
POTASSIUM BLD-SCNC: 4.1 MMOL/L (ref 3.5–5.2)
RBC # BLD AUTO: 4.57 10*6/MM3 (ref 3.9–5.2)
SODIUM BLD-SCNC: 140 MMOL/L (ref 136–145)
WBC NRBC COR # BLD: 12.12 10*3/MM3 (ref 4.5–10.7)

## 2017-08-01 PROCEDURE — 36415 COLL VENOUS BLD VENIPUNCTURE: CPT

## 2017-08-01 PROCEDURE — 85027 COMPLETE CBC AUTOMATED: CPT | Performed by: SURGERY

## 2017-08-01 PROCEDURE — 80048 BASIC METABOLIC PNL TOTAL CA: CPT | Performed by: SURGERY

## 2017-08-01 NOTE — DISCHARGE INSTRUCTIONS
Take the following medications the morning of surgery with a small sip of water:    NONE    General Instructions:  • Do not eat solid food after midnight the night before surgery.  • You may drink clear liquids day of surgery but must stop at least one hour before your hospital arrival time.  ( 1100 )  It is beneficial for you to have a clear drink that contains carbohydrates the day of surgery.  We suggest a 20 ounce bottle of Gatorade or Powerade for non-diabetic patients   Clear liquids are liquids you can see through.  Nothing red in color.     Plain water                               Sports drinks  Sodas                                   Gelatin (Jell-O)  Fruit juices without pulp such as white grape juice and apple juice  Popsicles that contain no fruit or yogurt  Tea or coffee (no cream or milk added)  Gatorade / Powerade  G2 / Powerade Zero    • Patients who avoid smoking, chewing tobacco and alcohol for 4 weeks prior to surgery have a reduced risk of post-operative complications.  Quit smoking as many days before surgery as you can.  • Do not smoke, use chewing tobacco or drink alcohol the day of surgery.   • Bring any papers given to you in the doctor’s office.  • Wear clean comfortable clothes and socks.  • Do not wear contact lenses or make-up.  Bring a case for your glasses. .  • Leave all other valuables and jewelry at home.  • The Pre-Admission Testing nurse will instruct you to bring medications if unable to obtain an accurate list in Pre-Admission Testing.            Preventing a Surgical Site Infection:  • For 2 to 3 days before surgery, avoid shaving with a razor because the razor can irritate skin and make it easier to develop an infection.  • The night prior to surgery sleep in a clean bed with clean clothing.  Do not allow pets to sleep with you.  • Shower on the morning of surgery using a fresh bar of anti-bacterial soap (such as Dial) and clean washcloth.  Dry with a clean towel and dress in  clean clothing.  • Ask your surgeon if you will be receiving antibiotics prior to surgery.  • Make sure you, your family, and all healthcare providers clean their hands with soap and water or an alcohol based hand  before caring for you or your wound.    Day of surgery:  Upon arrival, a Pre-op nurse and Anesthesiologist will review your health history, obtain vital signs, and answer questions you may have.  The only belongings needed at this time will be your home medications and if applicable your C-PAP/BI-PAP machine.  If you are staying overnight your family can leave the rest of your belongings in the car and bring them to your room later.  A Pre-op nurse will start an IV and you may receive medication in preparation for surgery, including something to help you relax.  Your family will be able to see you in the Pre-op area.  While you are in surgery your family should notify the waiting room  if they leave the waiting room area and provide a contact phone number.    Please be aware that surgery does come with discomfort.  We want to make every effort to control your discomfort so please discuss any uncontrolled symptoms with your nurse.   Your doctor will most likely have prescribed pain medications.      If you are going home after surgery you will receive individualized written care instructions before being discharged.  A responsible adult must drive you to and from the hospital on the day of your surgery and stay with you for 24 hours.      If you have any questions please call Pre-Admission Testing at [phone].    Deductibles and co-payments are collected on the day of service. Please be prepared to pay the required co-pay, deductible or deposit on the day of service as defined by your plan.

## 2017-08-02 ENCOUNTER — APPOINTMENT (OUTPATIENT)
Dept: PREADMISSION TESTING | Facility: HOSPITAL | Age: 47
End: 2017-08-02

## 2017-08-09 ENCOUNTER — HOSPITAL ENCOUNTER (OUTPATIENT)
Dept: ULTRASOUND IMAGING | Facility: HOSPITAL | Age: 47
Discharge: HOME | End: 2017-08-09
Attending: SURGERY

## 2017-08-09 ENCOUNTER — ANESTHESIA (OUTPATIENT)
Dept: PERIOP | Facility: HOSPITAL | Age: 47
End: 2017-08-09

## 2017-08-09 ENCOUNTER — APPOINTMENT (OUTPATIENT)
Dept: GENERAL RADIOLOGY | Facility: HOSPITAL | Age: 47
End: 2017-08-09

## 2017-08-09 ENCOUNTER — HOSPITAL ENCOUNTER (OUTPATIENT)
Facility: HOSPITAL | Age: 47
Setting detail: HOSPITAL OUTPATIENT SURGERY
Discharge: HOME | End: 2017-08-09
Attending: SURGERY | Admitting: SURGERY

## 2017-08-09 ENCOUNTER — ANESTHESIA EVENT (OUTPATIENT)
Dept: PERIOP | Facility: HOSPITAL | Age: 47
End: 2017-08-09

## 2017-08-09 VITALS
HEART RATE: 59 BPM | OXYGEN SATURATION: 97 % | TEMPERATURE: 98.7 F | SYSTOLIC BLOOD PRESSURE: 98 MMHG | DIASTOLIC BLOOD PRESSURE: 49 MMHG | RESPIRATION RATE: 16 BRPM

## 2017-08-09 DIAGNOSIS — N63.10 MASS OF RIGHT BREAST: ICD-10-CM

## 2017-08-09 DIAGNOSIS — N63.10 BREAST MASS, RIGHT: ICD-10-CM

## 2017-08-09 LAB
B-HCG UR QL: NEGATIVE
INTERNAL NEGATIVE CONTROL: NEGATIVE
INTERNAL POSITIVE CONTROL: POSITIVE
Lab: NORMAL

## 2017-08-09 PROCEDURE — 76098 X-RAY EXAM SURGICAL SPECIMEN: CPT

## 2017-08-09 PROCEDURE — 25010000002 FENTANYL CITRATE (PF) 100 MCG/2ML SOLUTION: Performed by: ANESTHESIOLOGY

## 2017-08-09 PROCEDURE — 25010000002 ONDANSETRON PER 1 MG: Performed by: ANESTHESIOLOGY

## 2017-08-09 PROCEDURE — 25010000002 PROPOFOL 10 MG/ML EMULSION: Performed by: ANESTHESIOLOGY

## 2017-08-09 PROCEDURE — 88307 TISSUE EXAM BY PATHOLOGIST: CPT | Performed by: SURGERY

## 2017-08-09 PROCEDURE — 19125 EXCISION BREAST LESION: CPT | Performed by: SURGERY

## 2017-08-09 PROCEDURE — 25010000002 MIDAZOLAM PER 1 MG: Performed by: ANESTHESIOLOGY

## 2017-08-09 PROCEDURE — 25010000002 KETOROLAC TROMETHAMINE PER 15 MG: Performed by: ANESTHESIOLOGY

## 2017-08-09 PROCEDURE — 25010000003 CEFAZOLIN IN DEXTROSE 2-4 GM/100ML-% SOLUTION: Performed by: SURGERY

## 2017-08-09 PROCEDURE — 25010000002 PROMETHAZINE PER 50 MG: Performed by: ANESTHESIOLOGY

## 2017-08-09 PROCEDURE — 25010000002 MEPERIDINE 25 MG/0.5ML SOLUTION: Performed by: ANESTHESIOLOGY

## 2017-08-09 RX ORDER — MAGNESIUM HYDROXIDE 1200 MG/15ML
LIQUID ORAL AS NEEDED
Status: DISCONTINUED | OUTPATIENT
Start: 2017-08-09 | End: 2017-08-09 | Stop reason: HOSPADM

## 2017-08-09 RX ORDER — ONDANSETRON 2 MG/ML
INJECTION INTRAMUSCULAR; INTRAVENOUS AS NEEDED
Status: DISCONTINUED | OUTPATIENT
Start: 2017-08-09 | End: 2017-08-09 | Stop reason: SURG

## 2017-08-09 RX ORDER — PROMETHAZINE HYDROCHLORIDE 25 MG/1
25 TABLET ORAL ONCE AS NEEDED
Status: COMPLETED | OUTPATIENT
Start: 2017-08-09 | End: 2017-08-09

## 2017-08-09 RX ORDER — PROMETHAZINE HYDROCHLORIDE 25 MG/ML
6.25 INJECTION, SOLUTION INTRAMUSCULAR; INTRAVENOUS ONCE AS NEEDED
Status: COMPLETED | OUTPATIENT
Start: 2017-08-09 | End: 2017-08-09

## 2017-08-09 RX ORDER — PROMETHAZINE HYDROCHLORIDE 25 MG/1
25 SUPPOSITORY RECTAL ONCE AS NEEDED
Status: COMPLETED | OUTPATIENT
Start: 2017-08-09 | End: 2017-08-09

## 2017-08-09 RX ORDER — ONDANSETRON 2 MG/ML
4 INJECTION INTRAMUSCULAR; INTRAVENOUS ONCE AS NEEDED
Status: COMPLETED | OUTPATIENT
Start: 2017-08-09 | End: 2017-08-09

## 2017-08-09 RX ORDER — MIDAZOLAM HYDROCHLORIDE 1 MG/ML
2 INJECTION INTRAMUSCULAR; INTRAVENOUS
Status: DISCONTINUED | OUTPATIENT
Start: 2017-08-09 | End: 2017-08-09 | Stop reason: HOSPADM

## 2017-08-09 RX ORDER — KETOROLAC TROMETHAMINE 30 MG/ML
INJECTION, SOLUTION INTRAMUSCULAR; INTRAVENOUS AS NEEDED
Status: DISCONTINUED | OUTPATIENT
Start: 2017-08-09 | End: 2017-08-09 | Stop reason: SURG

## 2017-08-09 RX ORDER — MIDAZOLAM HYDROCHLORIDE 1 MG/ML
1 INJECTION INTRAMUSCULAR; INTRAVENOUS
Status: DISCONTINUED | OUTPATIENT
Start: 2017-08-09 | End: 2017-08-09 | Stop reason: HOSPADM

## 2017-08-09 RX ORDER — HYDROCODONE BITARTRATE AND ACETAMINOPHEN 5; 325 MG/1; MG/1
1-2 TABLET ORAL EVERY 4 HOURS PRN
Qty: 20 TABLET | Refills: 0 | Status: SHIPPED | OUTPATIENT
Start: 2017-08-09 | End: 2018-01-04

## 2017-08-09 RX ORDER — LIDOCAINE HYDROCHLORIDE 20 MG/ML
INJECTION, SOLUTION INFILTRATION; PERINEURAL AS NEEDED
Status: DISCONTINUED | OUTPATIENT
Start: 2017-08-09 | End: 2017-08-09 | Stop reason: SURG

## 2017-08-09 RX ORDER — FENTANYL CITRATE 50 UG/ML
25 INJECTION, SOLUTION INTRAMUSCULAR; INTRAVENOUS
Status: DISCONTINUED | OUTPATIENT
Start: 2017-08-09 | End: 2017-08-09 | Stop reason: HOSPADM

## 2017-08-09 RX ORDER — BUPIVACAINE HYDROCHLORIDE 2.5 MG/ML
INJECTION, SOLUTION INFILTRATION; PERINEURAL AS NEEDED
Status: DISCONTINUED | OUTPATIENT
Start: 2017-08-09 | End: 2017-08-09 | Stop reason: HOSPADM

## 2017-08-09 RX ORDER — FENTANYL CITRATE 50 UG/ML
50 INJECTION, SOLUTION INTRAMUSCULAR; INTRAVENOUS
Status: DISCONTINUED | OUTPATIENT
Start: 2017-08-09 | End: 2017-08-09 | Stop reason: HOSPADM

## 2017-08-09 RX ORDER — CEFAZOLIN SODIUM 2 G/100ML
2 INJECTION, SOLUTION INTRAVENOUS ONCE
Status: COMPLETED | OUTPATIENT
Start: 2017-08-09 | End: 2017-08-09

## 2017-08-09 RX ORDER — FENTANYL CITRATE 50 UG/ML
INJECTION, SOLUTION INTRAMUSCULAR; INTRAVENOUS AS NEEDED
Status: DISCONTINUED | OUTPATIENT
Start: 2017-08-09 | End: 2017-08-09 | Stop reason: SURG

## 2017-08-09 RX ORDER — SODIUM CHLORIDE, SODIUM LACTATE, POTASSIUM CHLORIDE, CALCIUM CHLORIDE 600; 310; 30; 20 MG/100ML; MG/100ML; MG/100ML; MG/100ML
9 INJECTION, SOLUTION INTRAVENOUS CONTINUOUS
Status: DISCONTINUED | OUTPATIENT
Start: 2017-08-09 | End: 2017-08-09 | Stop reason: HOSPADM

## 2017-08-09 RX ORDER — SCOLOPAMINE TRANSDERMAL SYSTEM 1 MG/1
1 PATCH, EXTENDED RELEASE TRANSDERMAL ONCE
Status: DISCONTINUED | OUTPATIENT
Start: 2017-08-09 | End: 2017-08-09 | Stop reason: HOSPADM

## 2017-08-09 RX ORDER — PROPOFOL 10 MG/ML
VIAL (ML) INTRAVENOUS AS NEEDED
Status: DISCONTINUED | OUTPATIENT
Start: 2017-08-09 | End: 2017-08-09 | Stop reason: SURG

## 2017-08-09 RX ORDER — FAMOTIDINE 10 MG/ML
20 INJECTION, SOLUTION INTRAVENOUS ONCE
Status: COMPLETED | OUTPATIENT
Start: 2017-08-09 | End: 2017-08-09

## 2017-08-09 RX ORDER — SODIUM CHLORIDE 0.9 % (FLUSH) 0.9 %
1-10 SYRINGE (ML) INJECTION AS NEEDED
Status: DISCONTINUED | OUTPATIENT
Start: 2017-08-09 | End: 2017-08-09 | Stop reason: HOSPADM

## 2017-08-09 RX ORDER — HYDROCODONE BITARTRATE AND ACETAMINOPHEN 5; 325 MG/1; MG/1
1 TABLET ORAL ONCE AS NEEDED
Status: COMPLETED | OUTPATIENT
Start: 2017-08-09 | End: 2017-08-09

## 2017-08-09 RX ORDER — DIAZEPAM 5 MG/1
10 TABLET ORAL ONCE
Status: COMPLETED | OUTPATIENT
Start: 2017-08-09 | End: 2017-08-09

## 2017-08-09 RX ORDER — LIDOCAINE HYDROCHLORIDE 10 MG/ML
10 INJECTION, SOLUTION INFILTRATION; PERINEURAL ONCE
Status: COMPLETED | OUTPATIENT
Start: 2017-08-09 | End: 2017-08-09

## 2017-08-09 RX ADMIN — ONDANSETRON 4 MG: 2 INJECTION INTRAMUSCULAR; INTRAVENOUS at 14:13

## 2017-08-09 RX ADMIN — PROPOFOL 150 MG: 10 INJECTION, EMULSION INTRAVENOUS at 14:38

## 2017-08-09 RX ADMIN — MIDAZOLAM 1 MG: 1 INJECTION INTRAMUSCULAR; INTRAVENOUS at 14:15

## 2017-08-09 RX ADMIN — MEPERIDINE HYDROCHLORIDE 12.5 MG: 25 INJECTION, SOLUTION INTRAMUSCULAR; INTRAVENOUS; SUBCUTANEOUS at 15:58

## 2017-08-09 RX ADMIN — LIDOCAINE HYDROCHLORIDE 60 MG: 20 INJECTION, SOLUTION INFILTRATION; PERINEURAL at 14:38

## 2017-08-09 RX ADMIN — LIDOCAINE HYDROCHLORIDE 5 ML: 10 INJECTION, SOLUTION INFILTRATION; PERINEURAL at 14:50

## 2017-08-09 RX ADMIN — CEFAZOLIN SODIUM 2 G: 2 INJECTION, SOLUTION INTRAVENOUS at 14:33

## 2017-08-09 RX ADMIN — KETOROLAC TROMETHAMINE 30 MG: 30 INJECTION, SOLUTION INTRAMUSCULAR; INTRAVENOUS at 15:43

## 2017-08-09 RX ADMIN — PROMETHAZINE HYDROCHLORIDE 6.25 MG: 25 INJECTION INTRAMUSCULAR; INTRAVENOUS at 16:30

## 2017-08-09 RX ADMIN — MEPERIDINE HYDROCHLORIDE 12.5 MG: 25 INJECTION, SOLUTION INTRAMUSCULAR; INTRAVENOUS; SUBCUTANEOUS at 16:09

## 2017-08-09 RX ADMIN — FENTANYL CITRATE 50 MCG: 50 INJECTION INTRAMUSCULAR; INTRAVENOUS at 14:38

## 2017-08-09 RX ADMIN — FENTANYL CITRATE 50 MCG: 50 INJECTION INTRAMUSCULAR; INTRAVENOUS at 14:51

## 2017-08-09 RX ADMIN — SODIUM CHLORIDE, POTASSIUM CHLORIDE, SODIUM LACTATE AND CALCIUM CHLORIDE 9 ML/HR: 600; 310; 30; 20 INJECTION, SOLUTION INTRAVENOUS at 14:12

## 2017-08-09 RX ADMIN — DIAZEPAM 10 MG: 5 TABLET ORAL at 12:53

## 2017-08-09 RX ADMIN — FAMOTIDINE 20 MG: 10 INJECTION, SOLUTION INTRAVENOUS at 14:12

## 2017-08-09 RX ADMIN — ONDANSETRON 4 MG: 2 INJECTION INTRAMUSCULAR; INTRAVENOUS at 15:41

## 2017-08-09 RX ADMIN — HYDROCODONE BITARTRATE AND ACETAMINOPHEN 1 TABLET: 5; 325 TABLET ORAL at 16:20

## 2017-08-09 RX ADMIN — SCOPALAMINE 1 PATCH: 1 PATCH, EXTENDED RELEASE TRANSDERMAL at 14:14

## 2017-08-09 NOTE — OP NOTE
Operative note    Preoperative Diagnosis: Breast mass    Postoperative Diagnosis: Same    Procedure Performed:right breast needle localized excisional biopsy    Dictating physician and surgeon: Enrico Emerson MD    Indications-the patient has had persistent bloody nipple discharge.  An ultrasound shows a 4 mm intraductal mass and a biopsy was performed that was benign.  She continues to have nipple discharge and is felt that removal of this lesion would be the best thing.    EBL: Minimal    FINDINGS AND DESCRIPTION OF PROCEDURE:     The patient was taken to the operating room after successful needle localization of the involved area.  She was placed on the operating table in the supine position and given adequate general anesthesia. The right breast was then prepped and draped in sterile fashion.  The involved area was anesthetized with a combination of quarter percent Marcaine plain and 1% Xylocaine with epinephrine.  A curvilinear incision was made near the insertion point of the wire which was at the inferior edge of the areola. Then the guidewire was identified.  Dissection was carried down around the wire in a circumferential fashion and the specimen was removed.  The specimen was then x-rayed in the clip was identified within the specimen.            The wound was irrigated and hemostasis obtained using electrocautery unit.  The incision was then closed in layers using 3-0 Vicryl suture for the subcutaneous layer and a running 4-0 Vicryl subcuticular stitch for the skin.   Steri-Strips and a Tegaderm dressing were applied.     Sponge and needle counts were correct and the patient was taken to the recovery area in stable condition.

## 2017-08-09 NOTE — ANESTHESIA PROCEDURE NOTES
Airway  Urgency: elective    Airway not difficult    General Information and Staff    Patient location during procedure: OR  Anesthesiologist: IWONA PENN    Indications and Patient Condition  Indications for airway management: airway protection    Preoxygenated: yes  Mask difficulty assessment: 1 - vent by mask    Final Airway Details  Final airway type: supraglottic airway      Successful airway: classic  Size 3    Number of attempts at approach: 1

## 2017-08-09 NOTE — ANESTHESIA PREPROCEDURE EVALUATION
Anesthesia Evaluation     Patient summary reviewed and Nursing notes reviewed   history of anesthetic complications: PONV  NPO Solid Status: > 8 hours  NPO Liquid Status: > 8 hours     Airway   Mallampati: II  Dental - normal exam     Pulmonary - normal exam   (+) a smoker Current,   Cardiovascular - negative cardio ROS and normal exam        Neuro/Psych  (+) psychiatric history Anxiety and Depression,    GI/Hepatic/Renal/Endo - negative ROS     Musculoskeletal     Abdominal    Substance History      OB/GYN          Other                                        Anesthesia Plan    ASA 2     general     intravenous induction   Anesthetic plan and risks discussed with patient.

## 2017-08-09 NOTE — DISCHARGE INSTRUCTIONS
Remove the Scopolamine patch from behind your ear tomorrow.    Last dose of Norco was given at 4:20pm.

## 2017-08-09 NOTE — INTERVAL H&P NOTE
H&P updated. The patient was examined and the following changes are noted:  Her repeat ultrasound reveals a 4 mm retroareolar intraductal mass.  It is felt that this needs to be excised with ultrasound localization and she is here for that.

## 2017-08-09 NOTE — ANESTHESIA POSTPROCEDURE EVALUATION
Patient: Mary Banda    Procedure Summary     Date Anesthesia Start Anesthesia Stop Room / Location    08/09/17 1433 1557  [facility] 04 /  [facility]       Procedure Diagnosis Surgeon Provider    RIGHT BREAST BIOPSY WITH NEEDLE LOCALIZATION  (Right Breast) Breast mass, right  (Breast mass, right [N63]) MD Toni Calvo MD          Anesthesia Type: general  Last vitals  BP   116/80 (08/09/17 1640)    Temp        Pulse   61 (08/09/17 1640)   Resp   16 (08/09/17 1640)    SpO2   98 % (08/09/17 1640)      Post Anesthesia Care and Evaluation    Patient location during evaluation: PACU  Patient participation: complete - patient participated  Level of consciousness: awake and alert  Pain management: adequate  Airway patency: patent  Anesthetic complications: No anesthetic complications    Cardiovascular status: acceptable  Respiratory status: acceptable  Hydration status: acceptable    Comments: /80  Pulse 61  Temp 36.6 °C (97.8 °F) (Oral)   Resp 16  LMP Comment: 12 YEARS AGO   SpO2 98%

## 2017-08-11 ENCOUNTER — TELEPHONE (OUTPATIENT)
Dept: MAMMOGRAPHY | Facility: CLINIC | Age: 47
End: 2017-08-11

## 2017-08-11 LAB
LAB AP CASE REPORT: NORMAL
Lab: NORMAL
PATH REPORT.FINAL DX SPEC: NORMAL
PATH REPORT.GROSS SPEC: NORMAL

## 2017-08-24 ENCOUNTER — OFFICE VISIT (OUTPATIENT)
Dept: MAMMOGRAPHY | Facility: CLINIC | Age: 47
End: 2017-08-24

## 2017-08-24 VITALS
HEART RATE: 92 BPM | TEMPERATURE: 97.7 F | OXYGEN SATURATION: 99 % | DIASTOLIC BLOOD PRESSURE: 78 MMHG | SYSTOLIC BLOOD PRESSURE: 122 MMHG

## 2017-08-24 DIAGNOSIS — D24.1 INTRADUCTAL PAPILLOMA OF BREAST, RIGHT: Primary | ICD-10-CM

## 2017-08-24 PROCEDURE — 99024 POSTOP FOLLOW-UP VISIT: CPT | Performed by: SURGERY

## 2017-08-24 NOTE — PROGRESS NOTES
Chief Complaint: Mary Banda is a  47 y.o. female, initially referred by No ref. provider found , who is here today for a postoperative visit.    History of Present Illness:  In the interim,Mary Banda has had the following procedure and resultant pathology report: A right subareolar duct excision.  The pathology report shows intraductal papillomatosis with no atypia in the specimen and no evidence of malignancy.    She has noted no redness, warmth,drainage, swelling at the incision site. Denies fever or chills.  The Steri-Strips have come off on the wrong.      Current Outpatient Prescriptions:   •  cyclobenzaprine (FLEXERIL) 10 MG tablet, Take 1 tablet by mouth 3 (Three) Times a Day As Needed for muscle spasms., Disp: 60 tablet, Rfl: 1  •  fluticasone (FLONASE) 50 MCG/ACT nasal spray, 2 sprays into each nostril Daily. (Patient taking differently: 2 sprays by Each Nare route As Needed.), Disp: 1 each, Rfl: 3  •  hydrochlorothiazide (MICROZIDE) 12.5 MG capsule, Take 12.5 mg by mouth Daily As Needed., Disp: , Rfl:   •  HYDROcodone-acetaminophen (NORCO) 5-325 MG per tablet, Take 1-2 tablets by mouth Every 4 (Four) Hours As Needed (Pain)., Disp: 20 tablet, Rfl: 0  •  ibuprofen (ADVIL,MOTRIN) 800 MG tablet, Take 800 mg by mouth As Needed., Disp: , Rfl:   •  Loratadine (CLARITIN PO), Take 10 mg by mouth As Needed., Disp: , Rfl:   Physical examination  Right breast-incision at the edge of the areola is healing well.  The most medial portion has a little scab on it but there is no evidence of cellulitis or drainage.  The wound is not open.  Assessment:  Right breast intraductal papillomatosis-these operative findings do not significantly increase her risk factors for breast cancer above what she would have with general fibrocystic changes.  She has been encouraged to continue with monthly self breast examination and yearly mammography.    Plan:  I will see her on an as-needed basis.

## 2017-11-14 DIAGNOSIS — R60.0 LOCALIZED EDEMA: Primary | ICD-10-CM

## 2017-11-14 DIAGNOSIS — M50.90 CERVICAL DISC DISORDER: ICD-10-CM

## 2017-11-14 RX ORDER — CYCLOBENZAPRINE HCL 5 MG
5 TABLET ORAL 3 TIMES DAILY PRN
Qty: 90 TABLET | Refills: 1 | Status: SHIPPED | OUTPATIENT
Start: 2017-11-14 | End: 2018-12-11 | Stop reason: SDUPTHER

## 2017-11-14 RX ORDER — HYDROCHLOROTHIAZIDE 12.5 MG/1
12.5 CAPSULE, GELATIN COATED ORAL DAILY PRN
Qty: 90 CAPSULE | Refills: 0 | Status: SHIPPED | OUTPATIENT
Start: 2017-11-14 | End: 2018-01-26 | Stop reason: SDUPTHER

## 2018-01-04 ENCOUNTER — OFFICE VISIT (OUTPATIENT)
Dept: FAMILY MEDICINE CLINIC | Facility: CLINIC | Age: 48
End: 2018-01-04

## 2018-01-04 VITALS
WEIGHT: 190.6 LBS | SYSTOLIC BLOOD PRESSURE: 110 MMHG | TEMPERATURE: 98.2 F | HEART RATE: 105 BPM | OXYGEN SATURATION: 99 % | HEIGHT: 63 IN | DIASTOLIC BLOOD PRESSURE: 72 MMHG | BODY MASS INDEX: 33.77 KG/M2

## 2018-01-04 DIAGNOSIS — R45.86 REBOUND MOOD SWINGS: Primary | ICD-10-CM

## 2018-01-04 DIAGNOSIS — N95.1 MENOPAUSAL SYMPTOMS: ICD-10-CM

## 2018-01-04 PROCEDURE — 99214 OFFICE O/P EST MOD 30 MIN: CPT | Performed by: NURSE PRACTITIONER

## 2018-01-04 RX ORDER — BUPROPION HYDROCHLORIDE 150 MG/1
150 TABLET ORAL DAILY
Qty: 30 TABLET | Refills: 3 | Status: SHIPPED | OUTPATIENT
Start: 2018-01-04 | End: 2018-03-01 | Stop reason: SDUPTHER

## 2018-01-04 NOTE — PROGRESS NOTES
Subjective   Mary Banda is a 47 y.o. female. Patient is being seen today to discus anxiety, depression, and menopause.     History of Present Illness 47 yr old white female here today to discuss her increased anxiety depression and possibility of being menopausal. Patient had an ablation several years ago therefore does not have a menstrual cycle. Will need some lab work drawn.    The following portions of the patient's history were reviewed and updated as appropriate: allergies, current medications, past family history, past medical history, past social history, past surgical history and problem list.    Review of Systems   Genitourinary:        Menopause.   Psychiatric/Behavioral:        Anxiety.  Depression.    All other systems reviewed and are negative.      Objective   Physical Exam   Constitutional: She is oriented to person, place, and time. She appears well-developed and well-nourished.   HENT:   Head: Normocephalic and atraumatic.   Eyes: EOM are normal. Pupils are equal, round, and reactive to light.   glasses   Neck: Normal range of motion. Neck supple.   Cardiovascular: Normal rate and regular rhythm.    Pulmonary/Chest: Effort normal and breath sounds normal.   Abdominal: Soft. Bowel sounds are normal.   Musculoskeletal: Normal range of motion.   Neurological: She is alert and oriented to person, place, and time.   Skin: Skin is warm and dry.   Psychiatric: She has a normal mood and affect. Her behavior is normal. Judgment and thought content normal.   Nursing note and vitals reviewed.      Assessment/Plan   Mary was seen today for anxiety, depression and menopause.    Diagnoses and all orders for this visit:    Rebound mood swings  -     Follicle Stimulating Hormone; Future  -     buPROPion XL (WELLBUTRIN XL) 150 MG 24 hr tablet; Take 1 tablet by mouth Daily.  -     CBC & Differential; Future  -     Comprehensive Metabolic Panel  -     Lipid Panel  -     TSH    Menopausal symptoms  -     CBC &  Differential; Future  -     Comprehensive Metabolic Panel  -     Lipid Panel  -     TSH    Labs to be drawn when fasting. Will be called once resulted. Will begin Wellbutrin 150 mg QD. F/U 1 month to see how she is adjusting to meds. Continue routine medications. Continue heart healthy diet, drinking six-day glasses of water a day and exercising 30 minutes a day to raise her heart rate notify office immediately of any decline in health status or adverse reaction to medication.

## 2018-01-19 LAB
ALBUMIN SERPL-MCNC: 4 G/DL (ref 3.5–5.2)
ALBUMIN/GLOB SERPL: 1.3 G/DL
ALP SERPL-CCNC: 79 U/L (ref 39–117)
ALT SERPL-CCNC: 22 U/L (ref 1–33)
AST SERPL-CCNC: 13 U/L (ref 1–32)
BASOPHILS # BLD AUTO: 0.04 10*3/MM3 (ref 0–0.2)
BASOPHILS NFR BLD AUTO: 0.2 % (ref 0–1.5)
BILIRUB SERPL-MCNC: 0.4 MG/DL (ref 0.1–1.2)
BUN SERPL-MCNC: 7 MG/DL (ref 6–20)
BUN/CREAT SERPL: 8.1 (ref 7–25)
CALCIUM SERPL-MCNC: 9.3 MG/DL (ref 8.6–10.5)
CHLORIDE SERPL-SCNC: 102 MMOL/L (ref 98–107)
CHOLEST SERPL-MCNC: 170 MG/DL (ref 0–200)
CO2 SERPL-SCNC: 28 MMOL/L (ref 22–29)
CREAT SERPL-MCNC: 0.86 MG/DL (ref 0.57–1)
EOSINOPHIL # BLD AUTO: 0.22 10*3/MM3 (ref 0–0.7)
EOSINOPHIL NFR BLD AUTO: 1.4 % (ref 0.3–6.2)
ERYTHROCYTE [DISTWIDTH] IN BLOOD BY AUTOMATED COUNT: 14 % (ref 11.7–13)
FSH SERPL-ACNC: 6.2 MIU/ML
GLOBULIN SER CALC-MCNC: 3.2 GM/DL
GLUCOSE SERPL-MCNC: 100 MG/DL (ref 65–99)
HCT VFR BLD AUTO: 41.3 % (ref 35.6–45.5)
HDLC SERPL-MCNC: 38 MG/DL (ref 40–60)
HGB BLD-MCNC: 13.4 G/DL (ref 11.9–15.5)
IMM GRANULOCYTES # BLD: 0.07 10*3/MM3 (ref 0–0.03)
IMM GRANULOCYTES NFR BLD: 0.4 % (ref 0–0.5)
LDLC SERPL CALC-MCNC: 92 MG/DL (ref 0–100)
LYMPHOCYTES # BLD AUTO: 3.96 10*3/MM3 (ref 0.9–4.8)
LYMPHOCYTES NFR BLD AUTO: 24.4 % (ref 19.6–45.3)
MCH RBC QN AUTO: 29.1 PG (ref 26.9–32)
MCHC RBC AUTO-ENTMCNC: 32.4 G/DL (ref 32.4–36.3)
MCV RBC AUTO: 89.8 FL (ref 80.5–98.2)
MONOCYTES # BLD AUTO: 1.34 10*3/MM3 (ref 0.2–1.2)
MONOCYTES NFR BLD AUTO: 8.3 % (ref 5–12)
NEUTROPHILS # BLD AUTO: 10.61 10*3/MM3 (ref 1.9–8.1)
NEUTROPHILS NFR BLD AUTO: 65.3 % (ref 42.7–76)
PLATELET # BLD AUTO: 363 10*3/MM3 (ref 140–500)
POTASSIUM SERPL-SCNC: 4 MMOL/L (ref 3.5–5.2)
PROT SERPL-MCNC: 7.2 G/DL (ref 6–8.5)
RBC # BLD AUTO: 4.6 10*6/MM3 (ref 3.9–5.2)
SODIUM SERPL-SCNC: 142 MMOL/L (ref 136–145)
TRIGL SERPL-MCNC: 202 MG/DL (ref 0–150)
TSH SERPL DL<=0.005 MIU/L-ACNC: 1.75 MIU/ML (ref 0.27–4.2)
VLDLC SERPL CALC-MCNC: 40.4 MG/DL (ref 5–40)
WBC # BLD AUTO: 16.24 10*3/MM3 (ref 4.5–10.7)

## 2018-01-26 DIAGNOSIS — R60.0 LOCALIZED EDEMA: ICD-10-CM

## 2018-01-26 RX ORDER — HYDROCHLOROTHIAZIDE 12.5 MG/1
CAPSULE, GELATIN COATED ORAL
Qty: 90 CAPSULE | Refills: 0 | Status: SHIPPED | OUTPATIENT
Start: 2018-01-26 | End: 2018-05-03 | Stop reason: SDUPTHER

## 2018-02-15 DIAGNOSIS — Z20.828 EXPOSURE TO THE FLU: Primary | ICD-10-CM

## 2018-02-15 RX ORDER — OSELTAMIVIR PHOSPHATE 75 MG/1
75 CAPSULE ORAL DAILY
Qty: 10 CAPSULE | Refills: 0 | Status: SHIPPED | OUTPATIENT
Start: 2018-02-15 | End: 2018-03-01

## 2018-03-01 ENCOUNTER — OFFICE VISIT (OUTPATIENT)
Dept: FAMILY MEDICINE CLINIC | Facility: CLINIC | Age: 48
End: 2018-03-01

## 2018-03-01 VITALS
OXYGEN SATURATION: 95 % | HEART RATE: 72 BPM | BODY MASS INDEX: 33.42 KG/M2 | HEIGHT: 63 IN | SYSTOLIC BLOOD PRESSURE: 110 MMHG | TEMPERATURE: 98.2 F | WEIGHT: 188.6 LBS | DIASTOLIC BLOOD PRESSURE: 78 MMHG

## 2018-03-01 DIAGNOSIS — J10.1 INFLUENZA A: ICD-10-CM

## 2018-03-01 DIAGNOSIS — R45.86 REBOUND MOOD SWINGS: ICD-10-CM

## 2018-03-01 DIAGNOSIS — M79.644 THUMB PAIN, RIGHT: Primary | ICD-10-CM

## 2018-03-01 PROCEDURE — 99213 OFFICE O/P EST LOW 20 MIN: CPT | Performed by: NURSE PRACTITIONER

## 2018-03-01 PROCEDURE — 73110 X-RAY EXAM OF WRIST: CPT | Performed by: NURSE PRACTITIONER

## 2018-03-01 RX ORDER — BUPROPION HYDROCHLORIDE 150 MG/1
150 TABLET ORAL DAILY
Qty: 90 TABLET | Refills: 3 | Status: SHIPPED | OUTPATIENT
Start: 2018-03-01 | End: 2018-04-02 | Stop reason: SDUPTHER

## 2018-03-01 RX ORDER — FLUTICASONE PROPIONATE 50 MCG
2 SPRAY, SUSPENSION (ML) NASAL DAILY
Qty: 16 G | Refills: 3 | Status: SHIPPED | OUTPATIENT
Start: 2018-03-01 | End: 2018-12-11 | Stop reason: SDUPTHER

## 2018-03-01 NOTE — PROGRESS NOTES
Subjective   Mary Banda is a 47 y.o. female. Patient is being seen today for right wrist pain. This pain has been going on for about 6 months. It got worse the other day. She is not sure what caused the pain to begin.     History of Present Illness 47-year-old  female presents to the office today with sudden onset of intermittent right wrist/thumb aching pain that began approximately 6 months ago. The other day it increased in intensity. Patient is not sure what is causing the pain. Has treated with wrist brace. Nothing seems to make it better or worse and on a scale of 1-10 rates her pain as  0-8.    The following portions of the patient's history were reviewed and updated as appropriate: allergies, current medications, past family history, past medical history, past social history, past surgical history and problem list.    Review of Systems   Musculoskeletal:        Wrist pain.    All other systems reviewed and are negative.      Objective   Physical Exam   Constitutional: She is oriented to person, place, and time. She appears well-developed and well-nourished.   HENT:   Head: Normocephalic and atraumatic.   Eyes: EOM are normal. Pupils are equal, round, and reactive to light.   Glasses   Neck: Normal range of motion. Neck supple.   Cardiovascular: Normal rate, regular rhythm and normal heart sounds.    Pulmonary/Chest: Effort normal and breath sounds normal.   Abdominal: Soft. Bowel sounds are normal.   Musculoskeletal: She exhibits edema.   Complain of right wrist/ thumb pain. Has full range of motion in thumb.  strength is equal. Patient has not had injury to thumb or wrist that she is aware of. Pain is intermittent and interfering with her profession. 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 Mild intermittent swelling   Neurological: She is alert and oriented to person, place, and time.   Skin: Skin is warm and dry.   Psychiatric: She has a normal mood and affect. Her behavior  is normal. Judgment and thought content normal.   Nursing note and vitals reviewed.      Assessment/Plan   Mary was seen today for wrist pain.    Diagnoses and all orders for this visit:    Thumb pain, right  -     XR Wrist 3+ View Left; Future  -     Ambulatory Referral to Hand Surgery    Rebound mood swings  -     buPROPion XL (WELLBUTRIN XL) 150 MG 24 hr tablet; Take 1 tablet by mouth Daily.    Influenza A  -     fluticasone (FLONASE) 50 MCG/ACT nasal spray; 2 sprays into each nostril Daily.    Will return to office for x-rays of hand. Given referral to ambulatory hand surgery. Aware of need for pneumococcal vaccine, T dap vaccine and Pap smear. Will contemplate for later date. Declined influenza vaccine. To continue using hand brace elevate hand whenever possible and may alternate heat and ice 15-20 minutes off and on when she is at home and able to do that. Notify office immediately of any decline in health status.

## 2018-03-02 DIAGNOSIS — M79.644 THUMB PAIN, RIGHT: Primary | ICD-10-CM

## 2018-04-02 ENCOUNTER — OFFICE VISIT (OUTPATIENT)
Dept: FAMILY MEDICINE CLINIC | Facility: CLINIC | Age: 48
End: 2018-04-02

## 2018-04-02 VITALS
SYSTOLIC BLOOD PRESSURE: 102 MMHG | TEMPERATURE: 97.9 F | HEART RATE: 85 BPM | OXYGEN SATURATION: 98 % | HEIGHT: 63 IN | BODY MASS INDEX: 34.45 KG/M2 | DIASTOLIC BLOOD PRESSURE: 72 MMHG | WEIGHT: 194.4 LBS

## 2018-04-02 DIAGNOSIS — Z23 ENCOUNTER FOR ADMINISTRATION OF VACCINE: ICD-10-CM

## 2018-04-02 DIAGNOSIS — F41.8 MIXED ANXIETY DEPRESSIVE DISORDER: Primary | ICD-10-CM

## 2018-04-02 DIAGNOSIS — R45.86 REBOUND MOOD SWINGS: ICD-10-CM

## 2018-04-02 PROCEDURE — 99213 OFFICE O/P EST LOW 20 MIN: CPT | Performed by: NURSE PRACTITIONER

## 2018-04-02 PROCEDURE — 90715 TDAP VACCINE 7 YRS/> IM: CPT | Performed by: NURSE PRACTITIONER

## 2018-04-02 PROCEDURE — 90471 IMMUNIZATION ADMIN: CPT | Performed by: NURSE PRACTITIONER

## 2018-04-02 RX ORDER — BUPROPION HYDROCHLORIDE 150 MG/1
150 TABLET ORAL DAILY
Qty: 90 TABLET | Refills: 3 | Status: SHIPPED | OUTPATIENT
Start: 2018-04-02 | End: 2018-04-24 | Stop reason: DRUGHIGH

## 2018-04-02 NOTE — PROGRESS NOTES
Subjective   Mary Banda is a 47 y.o. female. Patient is being seen today for follow up on anxiety and depression.     History of Present Illness 47-year-old  female presents to the office today to follow-up on her anxiety and depression being treated with Wellbutrin  mg 24 hour tablet.    The following portions of the patient's history were reviewed and updated as appropriate: allergies, current medications, past family history, past medical history, past social history, past surgical history and problem list.    Review of Systems   Constitutional: Negative.    Psychiatric/Behavioral:        Anxiety.  Depression.        Objective   Physical Exam   Constitutional: She is oriented to person, place, and time. She appears well-developed and well-nourished.   HENT:   Head: Normocephalic and atraumatic.   Eyes: EOM are normal. Pupils are equal, round, and reactive to light.   Neck: Normal range of motion. Neck supple.   Cardiovascular: Normal rate and regular rhythm.    Pulmonary/Chest: Effort normal and breath sounds normal.   Abdominal: Soft. Bowel sounds are normal.   Musculoskeletal: Normal range of motion.   Neurological: She is alert and oriented to person, place, and time.   Skin: Skin is warm and dry. Capillary refill takes less than 2 seconds.   Psychiatric: She has a normal mood and affect. Her behavior is normal. Judgment and thought content normal.   Nursing note and vitals reviewed.      Assessment/Plan   Mary was seen today for follow-up.    Diagnoses and all orders for this visit:    Mixed anxiety depressive disorder    Rebound mood swings  -     buPROPion XL (WELLBUTRIN XL) 150 MG 24 hr tablet; Take 1 tablet by mouth Daily.    Encounter for administration of vaccine  -     Tdap Vaccine Greater Than or Equal To 8yo IM    Patient tolerating Wellbutrin  mg tablet per day well. Notices that she gets aggravated by small things but overall its working for her. Will follow-up every 3 months  as needed. Patient will notify office. Patient gets Pap smear done by GYN. immediately of any decline in health status. Patient declines pneumococcal vaccine. T dap to be given prior to leaving office today. Spoke with patient to do something every day just for herself. A special latte, reading chapter in her favorite book or getting together with a girlfriend and chatting.

## 2018-04-24 ENCOUNTER — OFFICE VISIT (OUTPATIENT)
Dept: FAMILY MEDICINE CLINIC | Facility: CLINIC | Age: 48
End: 2018-04-24

## 2018-04-24 VITALS
HEIGHT: 63 IN | WEIGHT: 188.6 LBS | DIASTOLIC BLOOD PRESSURE: 76 MMHG | TEMPERATURE: 98.4 F | BODY MASS INDEX: 33.42 KG/M2 | HEART RATE: 113 BPM | SYSTOLIC BLOOD PRESSURE: 136 MMHG | OXYGEN SATURATION: 98 %

## 2018-04-24 DIAGNOSIS — F41.8 MIXED ANXIETY DEPRESSIVE DISORDER: Primary | ICD-10-CM

## 2018-04-24 DIAGNOSIS — G25.81 RESTLESS LEG SYNDROME, UNCONTROLLED: ICD-10-CM

## 2018-04-24 PROCEDURE — 99214 OFFICE O/P EST MOD 30 MIN: CPT | Performed by: NURSE PRACTITIONER

## 2018-04-24 RX ORDER — BUPROPION HYDROCHLORIDE 300 MG/1
300 TABLET ORAL DAILY
Qty: 30 TABLET | Refills: 6 | Status: SHIPPED | OUTPATIENT
Start: 2018-04-24 | End: 2018-06-25 | Stop reason: SDUPTHER

## 2018-04-24 RX ORDER — ROPINIROLE 1 MG/1
1 TABLET, FILM COATED ORAL NIGHTLY
Qty: 30 TABLET | Refills: 6 | Status: SHIPPED | OUTPATIENT
Start: 2018-04-24 | End: 2018-06-25 | Stop reason: SDUPTHER

## 2018-04-24 NOTE — PROGRESS NOTES
Subjective   Mary Banda is a 47 y.o. female. Patient is being evaluated today for anxiety.     History of Present Illness 47-year-old  female presents to the office for reevaluation of anxiety. Currently on Wellbutrin 150 mg 24 hour tablet. Offers no complaints with medication. Has had many changes in the family dynamics at her home. She has helped raise her eldest grandchild since birth. Mother recently has decided to relocate in her own apartment with her 2 children. This is a big change for the patient.Pt daughter has yet to move, but is being very irresponsible, not helping with cleaning house.  Is emotionally distraught over loss of closeness with grandchildren.  1-10 scale rates emotion distress as 9.  The following portions of the patient's history were reviewed and updated as appropriate: allergies, current medications, past family history, past medical history, past social history, past surgical history and problem list.    Review of Systems   Musculoskeletal:        Legs keeping her awake at night. Has been sleeping in chair in the living room so  can sleep   Psychiatric/Behavioral:        Anxiety.    All other systems reviewed and are negative.      Objective   Physical Exam   Constitutional: She is oriented to person, place, and time. She appears well-developed and well-nourished.   HENT:   Head: Normocephalic and atraumatic.   Eyes: EOM are normal. Pupils are equal, round, and reactive to light.   Neck: Normal range of motion. Neck supple.   Cardiovascular: Regular rhythm.    Blood pressure substantially elevated for this patient at 136/176 normally patient is in the 102/72 range. Heart rate also elevated at 113 normally between 72 and 85.   Pulmonary/Chest: Effort normal and breath sounds normal.   Abdominal: Soft. Bowel sounds are normal.   Musculoskeletal: Normal range of motion.   Neurological: She is alert and oriented to person, place, and time.   Skin: Skin is warm and dry.  Capillary refill takes 2 to 3 seconds.   Psychiatric: Judgment and thought content normal.   Anxious and tearful   Nursing note and vitals reviewed.      Assessment/Plan   Mary was seen today for anxiety.    Diagnoses and all orders for this visit:    Mixed anxiety depressive disorder  -     buPROPion XL (WELLBUTRIN XL) 300 MG 24 hr tablet; Take 1 tablet by mouth Daily.    Restless leg syndrome, uncontrolled  -     rOPINIRole (REQUIP) 1 MG tablet; Take 1 tablet by mouth Every Night. Take 1 hour before bedtime.    Will increase Wellbutrin XL to 300 mg per day. F/U in 30 days sooner if needed. Call office/911 at once for any thoughts of hurting self or others.Will speak with Dr. Holm if this does not work for her.

## 2018-05-02 DIAGNOSIS — R60.0 LOCALIZED EDEMA: ICD-10-CM

## 2018-05-02 RX ORDER — HYDROCHLOROTHIAZIDE 12.5 MG/1
CAPSULE, GELATIN COATED ORAL
Qty: 90 CAPSULE | Refills: 0 | Status: CANCELLED | OUTPATIENT
Start: 2018-05-02

## 2018-05-03 DIAGNOSIS — R60.0 LOCALIZED EDEMA: ICD-10-CM

## 2018-05-03 RX ORDER — HYDROCHLOROTHIAZIDE 12.5 MG/1
12.5 CAPSULE, GELATIN COATED ORAL EVERY MORNING
Qty: 90 CAPSULE | Refills: 4 | Status: SHIPPED | OUTPATIENT
Start: 2018-05-03 | End: 2018-06-25 | Stop reason: SDUPTHER

## 2018-06-25 DIAGNOSIS — G25.81 RESTLESS LEG SYNDROME, UNCONTROLLED: ICD-10-CM

## 2018-06-25 DIAGNOSIS — R60.0 LOCALIZED EDEMA: ICD-10-CM

## 2018-06-25 DIAGNOSIS — F41.8 MIXED ANXIETY DEPRESSIVE DISORDER: ICD-10-CM

## 2018-06-25 RX ORDER — ROPINIROLE 1 MG/1
1 TABLET, FILM COATED ORAL NIGHTLY
Qty: 90 TABLET | Refills: 1 | Status: SHIPPED | OUTPATIENT
Start: 2018-06-25 | End: 2018-12-06 | Stop reason: SDUPTHER

## 2018-06-25 RX ORDER — BUPROPION HYDROCHLORIDE 300 MG/1
300 TABLET ORAL DAILY
Qty: 90 TABLET | Refills: 1 | Status: SHIPPED | OUTPATIENT
Start: 2018-06-25 | End: 2018-07-30

## 2018-06-25 RX ORDER — HYDROCHLOROTHIAZIDE 12.5 MG/1
12.5 CAPSULE, GELATIN COATED ORAL EVERY MORNING
Qty: 90 CAPSULE | Refills: 1 | Status: SHIPPED | OUTPATIENT
Start: 2018-06-25 | End: 2018-12-11 | Stop reason: SDUPTHER

## 2018-07-30 ENCOUNTER — OFFICE VISIT (OUTPATIENT)
Dept: FAMILY MEDICINE CLINIC | Facility: CLINIC | Age: 48
End: 2018-07-30

## 2018-07-30 VITALS
DIASTOLIC BLOOD PRESSURE: 72 MMHG | WEIGHT: 192 LBS | BODY MASS INDEX: 34.02 KG/M2 | OXYGEN SATURATION: 99 % | RESPIRATION RATE: 16 BRPM | TEMPERATURE: 98.2 F | SYSTOLIC BLOOD PRESSURE: 108 MMHG | HEIGHT: 63 IN | HEART RATE: 80 BPM

## 2018-07-30 DIAGNOSIS — G89.29 CHRONIC RIGHT SHOULDER PAIN: Primary | ICD-10-CM

## 2018-07-30 DIAGNOSIS — M25.511 CHRONIC RIGHT SHOULDER PAIN: Primary | ICD-10-CM

## 2018-07-30 PROCEDURE — 73030 X-RAY EXAM OF SHOULDER: CPT | Performed by: NURSE PRACTITIONER

## 2018-07-30 PROCEDURE — 99214 OFFICE O/P EST MOD 30 MIN: CPT | Performed by: NURSE PRACTITIONER

## 2018-07-30 RX ORDER — HYDROCODONE BITARTRATE AND ACETAMINOPHEN 5; 325 MG/1; MG/1
1 TABLET ORAL EVERY 12 HOURS PRN
Qty: 45 TABLET | Refills: 0 | Status: SHIPPED | OUTPATIENT
Start: 2018-07-30 | End: 2018-12-11

## 2018-07-30 NOTE — PROGRESS NOTES
Subjective   Mary Banda is a 48 y.o. female. Presents today with shoulder pain, ongoing for approximately 6 months.     History of Present Illness 48-year-old  female presents to the office today with complaint of Right sided shoulder pain that is been ongoing for the last 6 months. States that the pain began  suddenly  and has been  Intermittent. Treated with ibuprofen 800 mg tablets and Flexeril 5 mg 3 times a day as needed. On a scale of 1-10 rates her discomfort as 5-8. Pain has been has been a dull type of pain that she still with. Was swimming in the pool recently and when she raised her arm out to put on the legible pulled the pain became worse. After exiting the pool the pain became very intense that is why she is seeking treatment today. 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 Pain today brings tears to her eyes.    The following portions of the patient's history were reviewed and updated as appropriate: allergies, current medications, past family history, past medical history, past social history, past surgical history and problem list.    Review of Systems   Musculoskeletal:        Shoulder pain   All other systems reviewed and are negative.      Objective   Physical Exam   Constitutional: She is oriented to person, place, and time. She appears well-developed and well-nourished.   HENT:   Head: Normocephalic and atraumatic.   Eyes: Pupils are equal, round, and reactive to light. EOM are normal.   Neck: Normal range of motion. Neck supple.   Cardiovascular: Normal rate and regular rhythm.    Pulmonary/Chest: Effort normal and breath sounds normal.   Abdominal: Soft. Bowel sounds are normal.   Musculoskeletal:   Able to lift arm only 15° and pain becomes intense. Strength 3 of 5 on right 55 on left.   Neurological: She is alert and oriented to person, place, and time.   Skin: Skin is warm and dry. Capillary refill takes less than 2 seconds.   Psychiatric: She has a normal mood and affect. Her behavior is  normal. Judgment and thought content normal.   Nursing note and vitals reviewed.      Assessment/Plan   Mary was seen today for shoulder pain.    Diagnoses and all orders for this visit:    Chronic right shoulder pain  -     XR Shoulder 2+ View Right (In Office)  -     HYDROcodone-acetaminophen (NORCO) 5-325 MG per tablet; Take 1 tablet by mouth Every 12 (Twelve) Hours As Needed for Severe Pain .    In office x-ray done to determine cause of patient's pain. No other film available at this time. Will send to radiology for formal over reading. In office x-ray shows acetabulum of humerus improperly placed as though its jammed into the cup. We will get MRI since patient can only range arm 15° with intense pain. Wishing to rule out rotator cuff tear. Will send home on Norco 5/325 mg tablet 1 tablet every 12 hours as needed for severe pain. Will use ibuprofen and Flexeril for muscle spasm and pain between doses of Norco. Patient has arm in sling with hand higher than heart. Will not pursue physical therapy until results of MRI are known.

## 2018-07-31 ENCOUNTER — HOSPITAL ENCOUNTER (OUTPATIENT)
Dept: MRI IMAGING | Facility: HOSPITAL | Age: 48
Discharge: HOME | End: 2018-07-31
Admitting: NURSE PRACTITIONER

## 2018-07-31 DIAGNOSIS — M25.511 CHRONIC RIGHT SHOULDER PAIN: ICD-10-CM

## 2018-07-31 DIAGNOSIS — G89.29 CHRONIC RIGHT SHOULDER PAIN: ICD-10-CM

## 2018-07-31 PROCEDURE — 73221 MRI JOINT UPR EXTREM W/O DYE: CPT

## 2018-08-09 ENCOUNTER — TELEPHONE (OUTPATIENT)
Dept: FAMILY MEDICINE CLINIC | Facility: CLINIC | Age: 48
End: 2018-08-09

## 2018-08-09 DIAGNOSIS — M25.511 RIGHT SHOULDER PAIN, UNSPECIFIED CHRONICITY: Primary | ICD-10-CM

## 2018-08-09 NOTE — TELEPHONE ENCOUNTER
Patient called she is still having severe shoulder pain, she would like to see [facility] [address]  Her best call back is [phone]

## 2018-12-06 DIAGNOSIS — G25.81 RESTLESS LEG SYNDROME, UNCONTROLLED: ICD-10-CM

## 2018-12-06 DIAGNOSIS — F41.8 MIXED ANXIETY DEPRESSIVE DISORDER: ICD-10-CM

## 2018-12-06 RX ORDER — BUPROPION HYDROCHLORIDE 300 MG/1
TABLET ORAL
Qty: 90 TABLET | Refills: 1 | OUTPATIENT
Start: 2018-12-06

## 2018-12-06 RX ORDER — ROPINIROLE 1 MG/1
TABLET, FILM COATED ORAL
Qty: 90 TABLET | Refills: 0 | Status: SHIPPED | OUTPATIENT
Start: 2018-12-06 | End: 2018-12-11 | Stop reason: SDUPTHER

## 2018-12-11 ENCOUNTER — OFFICE VISIT (OUTPATIENT)
Dept: FAMILY MEDICINE CLINIC | Facility: CLINIC | Age: 48
End: 2018-12-11

## 2018-12-11 VITALS
WEIGHT: 194.8 LBS | OXYGEN SATURATION: 99 % | BODY MASS INDEX: 34.52 KG/M2 | SYSTOLIC BLOOD PRESSURE: 112 MMHG | RESPIRATION RATE: 16 BRPM | DIASTOLIC BLOOD PRESSURE: 64 MMHG | HEART RATE: 71 BPM | TEMPERATURE: 97.8 F | HEIGHT: 63 IN

## 2018-12-11 DIAGNOSIS — M50.90 CERVICAL DISC DISORDER: ICD-10-CM

## 2018-12-11 DIAGNOSIS — Z00.00 PHYSICAL EXAM, ANNUAL: Primary | ICD-10-CM

## 2018-12-11 DIAGNOSIS — G25.81 RESTLESS LEG SYNDROME, UNCONTROLLED: ICD-10-CM

## 2018-12-11 DIAGNOSIS — Z12.31 ENCOUNTER FOR SCREENING MAMMOGRAM FOR BREAST CANCER: ICD-10-CM

## 2018-12-11 DIAGNOSIS — R60.0 LOCALIZED EDEMA: ICD-10-CM

## 2018-12-11 DIAGNOSIS — J10.1 INFLUENZA A: ICD-10-CM

## 2018-12-11 LAB
25(OH)D3+25(OH)D2 SERPL-MCNC: 21.9 NG/ML (ref 30–100)
ALBUMIN SERPL-MCNC: 4.1 G/DL (ref 3.5–5.2)
ALBUMIN/GLOB SERPL: 1.7 G/DL
ALP SERPL-CCNC: 70 U/L (ref 39–117)
ALT SERPL-CCNC: 19 U/L (ref 1–33)
AST SERPL-CCNC: 10 U/L (ref 1–32)
BASOPHILS # BLD AUTO: 0.06 10*3/MM3 (ref 0–0.2)
BASOPHILS NFR BLD AUTO: 0.5 % (ref 0–1.5)
BILIRUB SERPL-MCNC: 0.2 MG/DL (ref 0.1–1.2)
BUN SERPL-MCNC: 12 MG/DL (ref 6–20)
BUN/CREAT SERPL: 15 (ref 7–25)
CALCIUM SERPL-MCNC: 9.1 MG/DL (ref 8.6–10.5)
CHLORIDE SERPL-SCNC: 102 MMOL/L (ref 98–107)
CHOLEST SERPL-MCNC: 167 MG/DL (ref 0–200)
CO2 SERPL-SCNC: 25.8 MMOL/L (ref 22–29)
CREAT SERPL-MCNC: 0.8 MG/DL (ref 0.57–1)
EOSINOPHIL # BLD AUTO: 0.18 10*3/MM3 (ref 0–0.7)
EOSINOPHIL NFR BLD AUTO: 1.6 % (ref 0.3–6.2)
ERYTHROCYTE [DISTWIDTH] IN BLOOD BY AUTOMATED COUNT: 14.1 % (ref 11.7–13)
GLOBULIN SER CALC-MCNC: 2.4 GM/DL
GLUCOSE SERPL-MCNC: 107 MG/DL (ref 65–99)
HCT VFR BLD AUTO: 39.2 % (ref 35.6–45.5)
HDLC SERPL-MCNC: 43 MG/DL (ref 40–60)
HGB BLD-MCNC: 12.7 G/DL (ref 11.9–15.5)
IMM GRANULOCYTES # BLD: 0.06 10*3/MM3 (ref 0–0.03)
IMM GRANULOCYTES NFR BLD: 0.5 % (ref 0–0.5)
LDLC SERPL CALC-MCNC: 100 MG/DL (ref 0–100)
LYMPHOCYTES # BLD AUTO: 3.69 10*3/MM3 (ref 0.9–4.8)
LYMPHOCYTES NFR BLD AUTO: 32.3 % (ref 19.6–45.3)
MCH RBC QN AUTO: 28.5 PG (ref 26.9–32)
MCHC RBC AUTO-ENTMCNC: 32.4 G/DL (ref 32.4–36.3)
MCV RBC AUTO: 88.1 FL (ref 80.5–98.2)
MONOCYTES # BLD AUTO: 1.02 10*3/MM3 (ref 0.2–1.2)
MONOCYTES NFR BLD AUTO: 8.9 % (ref 5–12)
NEUTROPHILS # BLD AUTO: 6.49 10*3/MM3 (ref 1.9–8.1)
NEUTROPHILS NFR BLD AUTO: 56.7 % (ref 42.7–76)
PLATELET # BLD AUTO: 328 10*3/MM3 (ref 140–500)
POTASSIUM SERPL-SCNC: 4.9 MMOL/L (ref 3.5–5.2)
PROT SERPL-MCNC: 6.5 G/DL (ref 6–8.5)
RBC # BLD AUTO: 4.45 10*6/MM3 (ref 3.9–5.2)
SODIUM SERPL-SCNC: 138 MMOL/L (ref 136–145)
TRIGL SERPL-MCNC: 121 MG/DL (ref 0–150)
TSH SERPL DL<=0.005 MIU/L-ACNC: 2.58 MIU/ML (ref 0.27–4.2)
VLDLC SERPL CALC-MCNC: 24.2 MG/DL (ref 5–40)
WBC # BLD AUTO: 11.44 10*3/MM3 (ref 4.5–10.7)

## 2018-12-11 PROCEDURE — 99396 PREV VISIT EST AGE 40-64: CPT | Performed by: NURSE PRACTITIONER

## 2018-12-11 RX ORDER — FLUTICASONE PROPIONATE 50 MCG
2 SPRAY, SUSPENSION (ML) NASAL DAILY
Qty: 16 G | Refills: 3 | Status: SHIPPED | OUTPATIENT
Start: 2018-12-11

## 2018-12-11 RX ORDER — CYCLOBENZAPRINE HCL 5 MG
5 TABLET ORAL 3 TIMES DAILY PRN
Qty: 90 TABLET | Refills: 1 | Status: SHIPPED | OUTPATIENT
Start: 2018-12-11

## 2018-12-11 RX ORDER — HYDROCHLOROTHIAZIDE 12.5 MG/1
12.5 CAPSULE, GELATIN COATED ORAL EVERY MORNING
Qty: 90 CAPSULE | Refills: 3 | Status: SHIPPED | OUTPATIENT
Start: 2018-12-11 | End: 2019-11-26 | Stop reason: SDUPTHER

## 2018-12-11 RX ORDER — ROPINIROLE 1 MG/1
1 TABLET, FILM COATED ORAL NIGHTLY
Qty: 90 TABLET | Refills: 1 | Status: SHIPPED | OUTPATIENT
Start: 2018-12-11 | End: 2019-08-29 | Stop reason: SDUPTHER

## 2018-12-11 NOTE — PROGRESS NOTES
Subjective   Mary Banda is a 48 y.o. female. Patient is being evaluated today for her annual physical examination.     History of Present Illness 48-year-old  female presents to the office today to have her annual physical exam. Offers no complaints. Has allergy to sulfa antibiotics. Taking 5 mg of Flexeril 3 times a day as needed for back spasms. Uses Flonase nasal spray for nasal congestion, hydrochlorothiazide 12.5 mg every morning for edema/HTN. Requip 1 mg tablet per day for restless leg syndrome and uses Advil 800 mg as needed for breakthrough discomfort.    The following portions of the patient's history were reviewed and updated as appropriate: allergies, current medications, past family history, past medical history, past social history, past surgical history and problem list.    Review of Systems   Cardiovascular:        HTN   Musculoskeletal:        Restless legs and back spasms   Allergic/Immunologic: Positive for environmental allergies.   All other systems reviewed and are negative.      Objective   Physical Exam   Constitutional: She is oriented to person, place, and time. She appears well-developed and well-nourished.   HENT:   Head: Normocephalic and atraumatic.   Right Ear: External ear normal.   Left Ear: External ear normal.   Nose: Nose normal.   Mouth/Throat: Oropharynx is clear and moist.   Eyes: Conjunctivae and EOM are normal. Pupils are equal, round, and reactive to light.   Glasses   Neck: Normal range of motion. Neck supple.   Cardiovascular: Normal rate, regular rhythm and normal heart sounds.   Pulmonary/Chest: Effort normal and breath sounds normal.   Abdominal: Soft. Bowel sounds are normal.   Genitourinary:   Genitourinary Comments: Patient shared with me one episode of urinary incontinence. She did not feel the urge to void but when she stood up urine ran down her legs. She sat down it stopped she stood up again and it began again. Patient wishes to wait and see if it  happens a second time and if it does we will send to gynecology urology. Goes to GYN for Pap smear.   Musculoskeletal: Normal range of motion.   Neurological: She is alert and oriented to person, place, and time.   Skin: Skin is warm and dry. Capillary refill takes less than 2 seconds.   Psychiatric: She has a normal mood and affect. Her behavior is normal. Judgment and thought content normal.   Nursing note and vitals reviewed.        Assessment/Plan   Mary was seen today for annual exam.    Diagnoses and all orders for this visit:    Physical exam, annual  -     CBC & Differential  -     Comprehensive Metabolic Panel  -     Lipid Panel  -     TSH  -     Vitamin D 25 Hydroxy    Cervical disc disorder  -     cyclobenzaprine (FLEXERIL) 5 MG tablet; Take 1 tablet by mouth 3 (Three) Times a Day As Needed for Muscle Spasms.    Influenza A  -     fluticasone (FLONASE) 50 MCG/ACT nasal spray; 2 sprays into the nostril(s) as directed by provider Daily.    Localized edema  -     hydrochlorothiazide (MICROZIDE) 12.5 MG capsule; Take 1 capsule by mouth Every Morning.    Restless leg syndrome, uncontrolled  -     rOPINIRole (REQUIP) 1 MG tablet; Take 1 tablet by mouth Every Night. Take 1 hour before bedtime.    Encounter for screening mammogram for breast cancer  -     Mammo Screening Bilateral With CAD; Future      Patient gets GYN care through her GYN. Made aware of need for second hepatitis A vaccine and pneumococcal 23 vaccine. Patient does not take the flu vaccine. Mammogram requested at [facility]. Labs drawn today will be called once resulted. Medications renewed as needed. Patient follow-up every 6 months and as needed notifying office immediately for any decline in health status. Health care maintenance issues have been addressed.

## 2018-12-13 DIAGNOSIS — R73.09 ELEVATED GLUCOSE: Primary | ICD-10-CM

## 2018-12-14 ENCOUNTER — TRANSCRIBE ORDERS (OUTPATIENT)
Dept: ADMINISTRATIVE | Facility: HOSPITAL | Age: 48
End: 2018-12-14

## 2018-12-14 DIAGNOSIS — Z12.31 VISIT FOR SCREENING MAMMOGRAM: Primary | ICD-10-CM

## 2019-01-07 DIAGNOSIS — R73.09 ELEVATED GLUCOSE: ICD-10-CM

## 2019-01-07 LAB
EXPIRATION DATE: NORMAL
HBA1C MFR BLD: 5.7 %
Lab: 932

## 2019-01-07 PROCEDURE — 83036 HEMOGLOBIN GLYCOSYLATED A1C: CPT | Performed by: NURSE PRACTITIONER

## 2019-01-15 ENCOUNTER — OFFICE VISIT (OUTPATIENT)
Dept: FAMILY MEDICINE CLINIC | Facility: CLINIC | Age: 49
End: 2019-01-15

## 2019-01-15 VITALS
BODY MASS INDEX: 34.66 KG/M2 | HEIGHT: 63 IN | WEIGHT: 195.6 LBS | TEMPERATURE: 98.3 F | DIASTOLIC BLOOD PRESSURE: 66 MMHG | SYSTOLIC BLOOD PRESSURE: 124 MMHG | OXYGEN SATURATION: 96 % | RESPIRATION RATE: 16 BRPM | HEART RATE: 83 BPM

## 2019-01-15 DIAGNOSIS — H66.003 ACUTE SUPPURATIVE OTITIS MEDIA OF BOTH EARS WITHOUT SPONTANEOUS RUPTURE OF TYMPANIC MEMBRANES, RECURRENCE NOT SPECIFIED: ICD-10-CM

## 2019-01-15 DIAGNOSIS — R11.0 NAUSEA: ICD-10-CM

## 2019-01-15 DIAGNOSIS — R05.9 COUGH: Primary | ICD-10-CM

## 2019-01-15 PROCEDURE — 99213 OFFICE O/P EST LOW 20 MIN: CPT | Performed by: NURSE PRACTITIONER

## 2019-01-15 RX ORDER — DEXTROMETHORPHAN HYDROBROMIDE AND PROMETHAZINE HYDROCHLORIDE 15; 6.25 MG/5ML; MG/5ML
5 SYRUP ORAL 4 TIMES DAILY PRN
Qty: 473 ML | Refills: 0 | Status: SHIPPED | OUTPATIENT
Start: 2019-01-15 | End: 2019-03-20

## 2019-01-15 RX ORDER — ONDANSETRON 4 MG/1
4 TABLET, FILM COATED ORAL EVERY 8 HOURS PRN
Qty: 12 TABLET | Refills: 0 | Status: SHIPPED | OUTPATIENT
Start: 2019-01-15 | End: 2019-03-20

## 2019-01-15 RX ORDER — AMOXICILLIN 875 MG/1
875 TABLET, COATED ORAL 2 TIMES DAILY
Qty: 20 TABLET | Refills: 0 | Status: SHIPPED | OUTPATIENT
Start: 2019-01-15 | End: 2019-01-25

## 2019-01-15 RX ORDER — BENZONATATE 200 MG/1
200 CAPSULE ORAL 3 TIMES DAILY PRN
Qty: 42 CAPSULE | Refills: 0 | Status: SHIPPED | OUTPATIENT
Start: 2019-01-15 | End: 2019-03-20

## 2019-01-15 NOTE — PROGRESS NOTES
Subjective   Mary Banda is a 48 y.o. female. Patient is being evaluated today for chest congestion and cough. Her symptoms started 2 nights ago.      History of Present Illness 48-year-old  female presents today with sudden onset of continuous chest congestion and cough that began 2 nights ago. Has treated her symptoms with tylenol. Rest makes better movement makes worse. Associated symptom of nausea. On a scale of 1-10 rates her discomfort as 5. 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    The following portions of the patient's history were reviewed and updated as appropriate: allergies, current medications, past family history, past medical history, past social history, past surgical history and problem list.    Review of Systems   Respiratory: Positive for cough.         Chest congestion.    All other systems reviewed and are negative.      Objective   Physical Exam   Constitutional: She is oriented to person, place, and time. She appears well-developed and well-nourished.   HENT:   Head: Normocephalic and atraumatic.   Lateral tympanic membranes burgundy in color. No cone of light. Nasal membranes red swollen with grayish green mucus. Throat and oral oropharynx reddened tonsils slightly enlarged uvula midline   Eyes: EOM are normal. Pupils are equal, round, and reactive to light.   Glasses   Neck: Normal range of motion. Neck supple.   Cardiovascular: Normal rate and regular rhythm.   Pulmonary/Chest: Effort normal and breath sounds normal.   Chest burns with coughing   Abdominal: Soft. Bowel sounds are normal.   Nausea   Musculoskeletal: Normal range of motion.   Neurological: She is alert and oriented to person, place, and time.   Skin: Skin is warm and dry. Capillary refill takes less than 2 seconds.   Psychiatric: She has a normal mood and affect. Her behavior is normal. Judgment and thought content normal.   Nursing note and vitals  reviewed.        Assessment/Plan   Mary was seen today for chest congestion and cough.    Diagnoses and all orders for this visit:    Cough  -     benzonatate (TESSALON) 200 MG capsule; Take 1 capsule by mouth 3 (Three) Times a Day As Needed for Cough.  -     promethazine-dextromethorphan (PROMETHAZINE-DM) 6.25-15 MG/5ML syrup; Take 5 mL by mouth 4 (Four) Times a Day As Needed for Cough.    Nausea  -     ondansetron (ZOFRAN) 4 MG tablet; Take 1 tablet by mouth Every 8 (Eight) Hours As Needed for Nausea or Vomiting.    Acute suppurative otitis media of both ears without spontaneous rupture of tympanic membranes, recurrence not specified  -     amoxicillin (AMOXIL) 875 MG tablet; Take 1 tablet by mouth 2 (Two) Times a Day for 10 days.    Will treat her cough with Tessalon Perles 200 mg capsules 1 capsule 3 times a day as needed and during the evening and off hours promethazine DM 1 teaspoon up to 4 times a day as needed for cough. Nausea will be addressed with Zofran 4 mg tablet 1 tablet every 8 hours as needed. Her acute otitis media be treated with amoxicillin 875 mg one tablet twice a day for full 10 days even if she feels better after 5 days will complete the full course. To remain indoors out of inclement weather. Allowing herself 8-10 hours a night of sleep. To sip fluids constantly during the waking hours. May alternate ibuprofen and Tylenol for body aches and headache should they arise. To notify office immediately of any decline in health status to emergency room for temperature greater than 101.6 shortness of breath or any life-threatening emergency. Would like patient to remain off work today and tomorrow may return to work if afebrile and able to resume duties.

## 2019-01-25 ENCOUNTER — HOSPITAL ENCOUNTER (OUTPATIENT)
Dept: MAMMOGRAPHY | Facility: HOSPITAL | Age: 49
Discharge: HOME | End: 2019-01-25
Admitting: NURSE PRACTITIONER

## 2019-01-25 DIAGNOSIS — Z12.31 VISIT FOR SCREENING MAMMOGRAM: ICD-10-CM

## 2019-01-25 PROCEDURE — 77063 BREAST TOMOSYNTHESIS BI: CPT

## 2019-01-25 PROCEDURE — 77067 SCR MAMMO BI INCL CAD: CPT

## 2019-03-20 ENCOUNTER — HOSPITAL ENCOUNTER (EMERGENCY)
Facility: HOSPITAL | Age: 49
Discharge: HOME | End: 2019-03-20
Attending: EMERGENCY MEDICINE | Admitting: EMERGENCY MEDICINE

## 2019-03-20 ENCOUNTER — APPOINTMENT (OUTPATIENT)
Dept: GENERAL RADIOLOGY | Facility: HOSPITAL | Age: 49
End: 2019-03-20

## 2019-03-20 ENCOUNTER — OFFICE VISIT (OUTPATIENT)
Dept: FAMILY MEDICINE CLINIC | Facility: CLINIC | Age: 49
End: 2019-03-20

## 2019-03-20 VITALS
SYSTOLIC BLOOD PRESSURE: 144 MMHG | BODY MASS INDEX: 34.73 KG/M2 | DIASTOLIC BLOOD PRESSURE: 80 MMHG | TEMPERATURE: 97.7 F | OXYGEN SATURATION: 98 % | WEIGHT: 196 LBS | HEART RATE: 99 BPM

## 2019-03-20 VITALS
HEIGHT: 63 IN | RESPIRATION RATE: 16 BRPM | OXYGEN SATURATION: 98 % | BODY MASS INDEX: 32.78 KG/M2 | SYSTOLIC BLOOD PRESSURE: 118 MMHG | DIASTOLIC BLOOD PRESSURE: 73 MMHG | TEMPERATURE: 98.4 F | HEART RATE: 94 BPM | WEIGHT: 185 LBS

## 2019-03-20 DIAGNOSIS — R00.0 TACHYCARDIA: Primary | ICD-10-CM

## 2019-03-20 DIAGNOSIS — R07.9 CHEST PAIN, UNSPECIFIED TYPE: ICD-10-CM

## 2019-03-20 DIAGNOSIS — R07.89 ATYPICAL CHEST PAIN: ICD-10-CM

## 2019-03-20 DIAGNOSIS — R06.02 SHORTNESS OF BREATH: ICD-10-CM

## 2019-03-20 DIAGNOSIS — R00.2 INTERMITTENT PALPITATIONS: Primary | ICD-10-CM

## 2019-03-20 DIAGNOSIS — R00.2 PALPITATIONS: ICD-10-CM

## 2019-03-20 DIAGNOSIS — R94.31 SHORTENED PR INTERVAL: ICD-10-CM

## 2019-03-20 LAB
ALBUMIN SERPL-MCNC: 4.4 G/DL (ref 3.5–5.2)
ALBUMIN/GLOB SERPL: 1.3 G/DL
ALP SERPL-CCNC: 76 U/L (ref 39–117)
ALT SERPL W P-5'-P-CCNC: 22 U/L (ref 1–33)
ANION GAP SERPL CALCULATED.3IONS-SCNC: 13.4 MMOL/L
AST SERPL-CCNC: 12 U/L (ref 1–32)
BASOPHILS # BLD AUTO: 0.09 10*3/MM3 (ref 0–0.2)
BASOPHILS NFR BLD AUTO: 0.6 % (ref 0–1.5)
BILIRUB SERPL-MCNC: 0.2 MG/DL (ref 0.2–1.2)
BUN BLD-MCNC: 12 MG/DL (ref 6–20)
BUN/CREAT SERPL: 15.6 (ref 7–25)
CALCIUM SPEC-SCNC: 10 MG/DL (ref 8.6–10.5)
CHLORIDE SERPL-SCNC: 102 MMOL/L (ref 98–107)
CO2 SERPL-SCNC: 23.6 MMOL/L (ref 22–29)
CREAT BLD-MCNC: 0.77 MG/DL (ref 0.57–1)
D DIMER PPP FEU-MCNC: 0.31 MCGFEU/ML (ref 0–0.49)
DEPRECATED RDW RBC AUTO: 41.8 FL (ref 37–54)
EOSINOPHIL # BLD AUTO: 0.13 10*3/MM3 (ref 0–0.4)
EOSINOPHIL NFR BLD AUTO: 0.9 % (ref 0.3–6.2)
ERYTHROCYTE [DISTWIDTH] IN BLOOD BY AUTOMATED COUNT: 13.4 % (ref 12.3–15.4)
GFR SERPL CREATININE-BSD FRML MDRD: 81 ML/MIN/1.73
GLOBULIN UR ELPH-MCNC: 3.3 GM/DL
GLUCOSE BLD-MCNC: 108 MG/DL (ref 65–99)
HCT VFR BLD AUTO: 41.9 % (ref 34–46.6)
HGB BLD-MCNC: 13.9 G/DL (ref 12–15.9)
IMM GRANULOCYTES # BLD AUTO: 0.08 10*3/MM3 (ref 0–0.05)
IMM GRANULOCYTES NFR BLD AUTO: 0.5 % (ref 0–0.5)
LYMPHOCYTES # BLD AUTO: 3.46 10*3/MM3 (ref 0.7–3.1)
LYMPHOCYTES NFR BLD AUTO: 23.5 % (ref 19.6–45.3)
MAGNESIUM SERPL-MCNC: 2.2 MG/DL (ref 1.6–2.6)
MCH RBC QN AUTO: 28.5 PG (ref 26.6–33)
MCHC RBC AUTO-ENTMCNC: 33.2 G/DL (ref 31.5–35.7)
MCV RBC AUTO: 85.9 FL (ref 79–97)
MONOCYTES # BLD AUTO: 0.92 10*3/MM3 (ref 0.1–0.9)
MONOCYTES NFR BLD AUTO: 6.2 % (ref 5–12)
NEUTROPHILS # BLD AUTO: 10.05 10*3/MM3 (ref 1.4–7)
NEUTROPHILS NFR BLD AUTO: 68.3 % (ref 42.7–76)
NRBC BLD AUTO-RTO: 0 /100 WBC (ref 0–0)
PLATELET # BLD AUTO: 333 10*3/MM3 (ref 140–450)
PMV BLD AUTO: 10.4 FL (ref 6–12)
POTASSIUM BLD-SCNC: 4.2 MMOL/L (ref 3.5–5.2)
PROT SERPL-MCNC: 7.7 G/DL (ref 6–8.5)
RBC # BLD AUTO: 4.88 10*6/MM3 (ref 3.77–5.28)
SODIUM BLD-SCNC: 139 MMOL/L (ref 136–145)
T4 FREE SERPL-MCNC: 1.16 NG/DL (ref 0.93–1.7)
TROPONIN T SERPL-MCNC: <0.01 NG/ML (ref 0–0.03)
TSH SERPL DL<=0.05 MIU/L-ACNC: 2.4 MIU/ML (ref 0.27–4.2)
WBC NRBC COR # BLD: 14.73 10*3/MM3 (ref 3.4–10.8)

## 2019-03-20 PROCEDURE — 99284 EMERGENCY DEPT VISIT MOD MDM: CPT

## 2019-03-20 PROCEDURE — 96360 HYDRATION IV INFUSION INIT: CPT

## 2019-03-20 PROCEDURE — 93010 ELECTROCARDIOGRAM REPORT: CPT | Performed by: INTERNAL MEDICINE

## 2019-03-20 PROCEDURE — 84439 ASSAY OF FREE THYROXINE: CPT | Performed by: EMERGENCY MEDICINE

## 2019-03-20 PROCEDURE — 83735 ASSAY OF MAGNESIUM: CPT | Performed by: EMERGENCY MEDICINE

## 2019-03-20 PROCEDURE — 71046 X-RAY EXAM CHEST 2 VIEWS: CPT

## 2019-03-20 PROCEDURE — 85379 FIBRIN DEGRADATION QUANT: CPT | Performed by: EMERGENCY MEDICINE

## 2019-03-20 PROCEDURE — 84443 ASSAY THYROID STIM HORMONE: CPT | Performed by: EMERGENCY MEDICINE

## 2019-03-20 PROCEDURE — 93000 ELECTROCARDIOGRAM COMPLETE: CPT | Performed by: PHYSICIAN ASSISTANT

## 2019-03-20 PROCEDURE — 84484 ASSAY OF TROPONIN QUANT: CPT | Performed by: EMERGENCY MEDICINE

## 2019-03-20 PROCEDURE — 93005 ELECTROCARDIOGRAM TRACING: CPT | Performed by: EMERGENCY MEDICINE

## 2019-03-20 PROCEDURE — 85025 COMPLETE CBC W/AUTO DIFF WBC: CPT | Performed by: EMERGENCY MEDICINE

## 2019-03-20 PROCEDURE — 93005 ELECTROCARDIOGRAM TRACING: CPT

## 2019-03-20 PROCEDURE — 80053 COMPREHEN METABOLIC PANEL: CPT | Performed by: EMERGENCY MEDICINE

## 2019-03-20 PROCEDURE — 99214 OFFICE O/P EST MOD 30 MIN: CPT | Performed by: PHYSICIAN ASSISTANT

## 2019-03-20 RX ORDER — SODIUM CHLORIDE 0.9 % (FLUSH) 0.9 %
10 SYRINGE (ML) INJECTION AS NEEDED
Status: DISCONTINUED | OUTPATIENT
Start: 2019-03-20 | End: 2019-03-20 | Stop reason: HOSPADM

## 2019-03-20 RX ADMIN — SODIUM CHLORIDE 1000 ML: 9 INJECTION, SOLUTION INTRAVENOUS at 17:21

## 2019-03-20 NOTE — ED PROVIDER NOTES
" EMERGENCY DEPARTMENT ENCOUNTER    CHIEF COMPLAINT  Chief Complaint: SOA  History given by: pt  History limited by: none  Room Number: 10/10  PMD: Rima Shahid APRN      HPI:  Pt is a 48 y.o. female who presents complaining of intermittent SOA and \"fast; skipping beats\" palpitations that started a few weeks ago but has become progressively worse. Pt admits to productive cough with clear sputum, rhinorrhea, and intermittent dizziness but denies leg swelling, nausea, vomiting fever, chills, or diaphoresis. Pt reports both of her parents had cardiac disease. Pt denies any recent travel.    Duration:  A few weeks  Onset: gradual  Timing: intermittent  Quality: \"SOA\"  Intensity/Severity: moderate  Progression: progressively worse  Associated Symptoms: productive cough with clear sputum, rhinorrhea, and intermittent dizziness, \"fast; skipping beats\" palpitations   Aggravating Factors: none  Alleviating Factors: none  Previous Episodes: none  Treatment before arrival: none    PAST MEDICAL HISTORY  Active Ambulatory Problems     Diagnosis Date Noted   • Mixed anxiety depressive disorder 06/28/2016   • Fatigue 06/28/2016   • Loss of hair 06/28/2016   • Herpes simplex type 1 infection 06/28/2016   • Hyperpigmentation of skin 06/28/2016   • Disc disorder of cervical region 06/28/2016   • Radicular pain 06/28/2016   • Tension headache 06/28/2016   • Insomnia 06/28/2016   • Pedal edema 10/13/2016   • Chronic left-sided low back pain without sciatica 03/06/2017   • Breast mass, right 07/27/2017   • Rebound mood swings 01/04/2018   • Menopausal symptoms 01/04/2018   • Thumb pain, right 03/01/2018   • Influenza A 03/01/2018   • Restless legs syndrome 04/24/2018   • Physical exam, annual 12/11/2018     Resolved Ambulatory Problems     Diagnosis Date Noted   • No Resolved Ambulatory Problems     Past Medical History:   Diagnosis Date   • Breast nodule    • DDD (degenerative disc disease), lumbar    • Heart " palpitations    • IBS (irritable bowel syndrome)    • Low back pain    • PONV (postoperative nausea and vomiting)    • Seasonal allergies        PAST SURGICAL HISTORY  Past Surgical History:   Procedure Laterality Date   • BREAST BIOPSY Right 8/9/2017    Procedure: RIGHT BREAST BIOPSY WITH NEEDLE LOCALIZATION ;  Surgeon: Enrico Emerson MD;  Location: [facility] OR;  Service:    • CHOLECYSTECTOMY  2002   • ENDOMETRIAL ABLATION W/ NOVASURE  2006   • OOPHORECTOMY Left 1994   • TUBAL ABDOMINAL LIGATION  1995       FAMILY HISTORY  Family History   Problem Relation Age of Onset   • Thyroid disease Mother    • Heart disease Mother    • Depression Mother    • Hypothyroidism Mother    • Stroke Father    • Hyperlipidemia Father    • Heart disease Father    • Diabetes Father    • Heart attack Father    • Lupus Sister    • Heart disease Maternal Grandmother    • Alcohol abuse Maternal Grandmother    • Lung cancer Maternal Grandmother    • Heart disease Maternal Grandfather    • Heart attack Maternal Grandfather    • Heart disease Paternal Grandmother    • Lung cancer Paternal Grandmother    • Aneurysm Sister    • Depression Daughter    • Hypertension Daughter    • Hypothyroidism Daughter    • Diabetes Paternal Aunt    • Diabetes Paternal Uncle        SOCIAL HISTORY  Social History     Socioeconomic History   • Marital status:      Spouse name: Not on file   • Number of children: Not on file   • Years of education: Not on file   • Highest education level: Not on file   Occupational History   • Occupation: LPN     Comment: [employer] March 2017   Tobacco Use   • Smoking status: Current Every Day Smoker     Packs/day: 0.50     Years: 15.00     Pack years: 7.50     Types: Cigarettes     Start date: 1991   • Smokeless tobacco: Never Used   Substance and Sexual Activity   • Alcohol use: Yes     Comment: social   • Drug use: No   • Sexual activity: Yes     Partners: Male     Birth control/protection: Surgical  "      ALLERGIES  Sulfa antibiotics    REVIEW OF SYSTEMS  Review of Systems   Constitutional: Negative for fever.   HENT: Positive for rhinorrhea. Negative for sore throat.    Eyes: Negative.    Respiratory: Positive for cough (productive with clear sputum) and shortness of breath.    Cardiovascular: Positive for palpitations (\"fast; skipping beats\"). Negative for chest pain.   Gastrointestinal: Negative for abdominal pain, diarrhea and vomiting.   Genitourinary: Negative for dysuria.   Musculoskeletal: Negative for neck pain.   Skin: Negative for rash.   Neurological: Positive for dizziness (intermittent). Negative for weakness, numbness and headaches.   Hematological: Negative.    Psychiatric/Behavioral: Negative.    All other systems reviewed and are negative.      PHYSICAL EXAM  ED Triage Vitals   Temp Heart Rate Resp BP SpO2   03/20/19 1603 03/20/19 1510 03/20/19 1603 03/20/19 1510 03/20/19 1510   98.5 °F (36.9 °C) 90 20 113/67 98 %      Temp src Heart Rate Source Patient Position BP Location FiO2 (%)   03/20/19 1603 03/20/19 1603 03/20/19 1603 -- --   Tympanic Monitor Sitting         Physical Exam   Constitutional: She is oriented to person, place, and time. She appears distressed (mild).   HENT:   Head: Normocephalic and atraumatic.   Right Ear: External ear normal.   Left Ear: External ear normal.   Mouth/Throat: Oropharynx is clear and moist.   Eyes: EOM are normal. Pupils are equal, round, and reactive to light.   Neck: Normal range of motion. Neck supple. No thyromegaly present.   Cardiovascular: Normal rate, regular rhythm and normal heart sounds.   No murmur heard.  Pulmonary/Chest: Effort normal and breath sounds normal. No respiratory distress.   Abdominal: Soft. Bowel sounds are normal. She exhibits no distension. There is no tenderness. There is no rebound and no guarding.   Musculoskeletal: Normal range of motion. She exhibits no edema (pedal) or tenderness (calf).   Lymphadenopathy:     She has no " cervical adenopathy.   Neurological: She is alert and oriented to person, place, and time. She has normal sensation and normal strength.   Skin: Skin is warm and dry. No rash noted.   Psychiatric: Affect normal. Her mood appears anxious.   Nursing note and vitals reviewed.      LAB RESULTS  Lab Results (last 24 hours)     Procedure Component Value Units Date/Time    CBC & Differential [[id number]] Collected:  03/20/19 1722    Specimen:  Blood Updated:  03/20/19 1749    Narrative:       The following orders were created for panel order CBC & Differential.  Procedure                               Abnormality         Status                     ---------                               -----------         ------                     CBC Auto Differential[[id number]]        Abnormal            Final result                 Please view results for these tests on the individual orders.    Comprehensive Metabolic Panel [[id number]]  (Abnormal) Collected:  03/20/19 1722    Specimen:  Blood Updated:  03/20/19 1808     Glucose 108 mg/dL      BUN 12 mg/dL      Creatinine 0.77 mg/dL      Sodium 139 mmol/L      Potassium 4.2 mmol/L      Chloride 102 mmol/L      CO2 23.6 mmol/L      Calcium 10.0 mg/dL      Total Protein 7.7 g/dL      Albumin 4.40 g/dL      ALT (SGPT) 22 U/L      AST (SGOT) 12 U/L      Alkaline Phosphatase 76 U/L      Total Bilirubin 0.2 mg/dL      eGFR Non African Amer 81 mL/min/1.73      Globulin 3.3 gm/dL      A/G Ratio 1.3 g/dL      BUN/Creatinine Ratio 15.6     Anion Gap 13.4 mmol/L     Narrative:       GFR Normal >60  Chronic Kidney Disease <60  Kidney Failure <15    D-dimer, Quantitative [[id number]]  (Normal) Collected:  03/20/19 1722    Specimen:  Blood Updated:  03/20/19 1801     D-Dimer, Quantitative 0.31 MCGFEU/mL     Narrative:       The [employer]ago D-Dimer test used in conjunction with a clinical pretest probability (PTP) assessment model, has been approved by the FDA to rule out the presence of venous  thromboembolism (VTE) in outpatients suspected of deep venous thrombosis (DVT) or pulmonary embolism (PE). The cut-off for negative predictive value is <0.50 MCGFEU/mL.    Magnesium [[id number]]  (Normal) Collected:  03/20/19 1722    Specimen:  Blood Updated:  03/20/19 1808     Magnesium 2.2 mg/dL     TSH [[id number]]  (Normal) Collected:  03/20/19 1722    Specimen:  Blood Updated:  03/20/19 1808     TSH 2.400 mIU/mL     T4, Free [[id number]]  (Normal) Collected:  03/20/19 1722    Specimen:  Blood Updated:  03/20/19 1808     Free T4 1.16 ng/dL     Troponin [[id number]]  (Normal) Collected:  03/20/19 1722    Specimen:  Blood Updated:  03/20/19 1808     Troponin T <0.010 ng/mL     Narrative:       Troponin T Reference Range:  <= 0.03 ng/mL-   Negative for AMI  >0.03 ng/mL-     Abnormal for myocardial necrosis.  Clinicians would have to utilize clinical acumen, EKG, Troponin and serial changes to determine if it is an Acute Myocardial Infarction or myocardial injury due to an underlying chronic condition.     CBC Auto Differential [[id number]]  (Abnormal) Collected:  03/20/19 1722    Specimen:  Blood Updated:  03/20/19 1749     WBC 14.73 10*3/mm3      RBC 4.88 10*6/mm3      Hemoglobin 13.9 g/dL      Hematocrit 41.9 %      MCV 85.9 fL      MCH 28.5 pg      MCHC 33.2 g/dL      RDW 13.4 %      RDW-SD 41.8 fl      MPV 10.4 fL      Platelets 333 10*3/mm3      Neutrophil % 68.3 %      Lymphocyte % 23.5 %      Monocyte % 6.2 %      Eosinophil % 0.9 %      Basophil % 0.6 %      Immature Grans % 0.5 %      Neutrophils, Absolute 10.05 10*3/mm3      Lymphocytes, Absolute 3.46 10*3/mm3      Monocytes, Absolute 0.92 10*3/mm3      Eosinophils, Absolute 0.13 10*3/mm3      Basophils, Absolute 0.09 10*3/mm3      Immature Grans, Absolute 0.08 10*3/mm3      nRBC 0.0 /100 WBC           I ordered the above labs and reviewed the results    RADIOLOGY  XR Chest 2 View      IMPRESSIONS: Normal chest x-ray.           I ordered the above noted  radiological studies. Interpreted by radiologist. Reviewed by me in PACS.       PROCEDURES  Procedures    EKG         EKG time: 1510   Rhythm/Rate: sinus rhythm, rate: 87  Normal P waves and normal OH interval, occasional PACs  Normal QRS, Normal axis  Normal ST and T waves    Interpreted Contemporaneously by me, independently viewed  unchanged compared to prior 6/25/14    HEARTSCORE    History  Highly suspicious              2    Moderately suspicious             1    Slightly or non-suspicious             0    ECG  Significant ST depression              2    Nonspecific repol disturbance            1    Normal                           0    Age  > or = 65                          2     46-65                           1    < or = 45                          0    Risk factors (hypercholesterolemia, HTN, DM, smoking, pos fam hx, obesity)                            > or = to 3 RF for atherosclerotic dx   2    1 or 2                 1    No risk factors                0    Troponin > or = 3x normal limit               2    1-3x normal limit    1    < or = Normal limit    0    Score  0 - 3 is low risk (0.9-1.7% risk of adverse cardiac event)  Score  4 - 6 is moderate risk (12-16.6% risk of adverse cardiac event)  Score  6 - 9 is high risk (50-65% risk of adverse cardiac event)    This patient's HEART score is 2         PROGRESS AND CONSULTS     1510  Ordered EKG for further viewing.    1710  Ordered labs and chest XR for further viewing. Ordered IV fluids.    1833  Rechecked patient who is resting comfortably. Discussed all lab and test results. Discussed option to repeat troponin. Pt denies. Discussed plan to discharge the pt and have the pt follow up with cardiology. Pt understands and agrees with the plan. All questions have been answered.        MEDICAL DECISION MAKING  Results were reviewed/discussed with the patient and they were also made aware of online access. Pt also made aware that some labs, such as  cultures, will not be resulted during ER visit and follow up with PMD is necessary.     MDM  Number of Diagnoses or Management Options     Amount and/or Complexity of Data Reviewed  Clinical lab tests: ordered and reviewed (Negative troponin, Negative D-Dimer)  Tests in the radiology section of CPT®: ordered and reviewed (Chest XR - NAD)  Tests in the medicine section of CPT®: ordered and reviewed (Refer to the procedure section of the note for EKG results)  Decide to obtain previous medical records or to obtain history from someone other than the patient: yes (EPIC)  Review and summarize past medical records: yes (Saw Mr. Felipe (PA) today for chest pain and SOA and was sent here for further evaluation)          HEART Score (for prediction of 6-week risk of major adverse cardiac event) reviewed and/or performed as part of the patient evaluation and treatment planning process.  The result associated with this review/performance is: 2      DIAGNOSIS  Final diagnoses:   Intermittent palpitations   Atypical chest pain       DISPOSITION  DISCHARGE    Patient discharged in stable condition.    Reviewed implications of results, diagnosis, meds, responsibility to follow up, warning signs and symptoms of possible worsening, potential complications and reasons to return to ER.    Patient/Family voiced understanding of above instructions.    Discussed plan for discharge, as there is no emergent indication for admission. Patient referred to primary care provider for BP management due to today's BP. Pt/family is agreeable and understands need for follow up and repeat testing.  Pt is aware that discharge does not mean that nothing is wrong but it indicates no emergency is present that requires admission and they must continue care with follow-up as given below or physician of their choice.     FOLLOW-UP  [address] [address] [phone]  Schedule an  appointment as soon as possible for a visit       Rima Shahid, APRN  [address] [facility] [id number] [phone]      As needed         Medication List      No changes were made to your prescriptions during this visit.         Latest Documented Vital Signs:  As of 6:43 PM  BP- 147/80 HR- 83 Temp- 98.5 °F (36.9 °C) (Tympanic) O2 sat- 100%    --  Documentation assistance provided by kriss Workman for Dr Mccarty.  Information recorded by the scribe was done at my direction and has been verified and validated by me.       Susan Workman  03/20/19 [id number]       Matthew Mccarty MD  03/20/19 0486

## 2019-03-20 NOTE — PATIENT INSTRUCTIONS
48 year old female who presents today with increased shortness of breath, left chest discomfort, and palpitations with recorded heart rate up to 130s yesterday. She reports SOA that is more chronic, however, she has had increased SOA the past couple days. She feels her heart racing and has chest pressure intermittent. Has had today, some while in office. Patient reports it has limited activity. She also had thoracic back pain that she reports improved with HCTZ dose.     Patient with short AR with tachycardia today that is abnormal from her previous pulse readings in office. She has some ST changes and Q waves as well. Patient reports sister with arrhythmia requiring ablation and mother and father with a fib. No previous cardiac issues for patient personally.     I have recommend that she go to ER today for workup/ further testing and evaluation/ monitoring. We will see her in follow up after d/c from the hospital. She is ready to quit smoking and we can discuss medication for this at her follow up.

## 2019-03-20 NOTE — DISCHARGE INSTRUCTIONS
Decrease your caffeine intake and follow up with [facility].  Please return to the ED if symptoms worsen with chest pain or worsening trouble breathing.

## 2019-03-22 ENCOUNTER — HOSPITAL ENCOUNTER (OUTPATIENT)
Dept: CT IMAGING | Facility: HOSPITAL | Age: 49
Discharge: HOME | End: 2019-03-22

## 2019-03-22 ENCOUNTER — HOSPITAL ENCOUNTER (OUTPATIENT)
Dept: CARDIOLOGY | Facility: HOSPITAL | Age: 49
Discharge: HOME | End: 2019-03-22
Admitting: INTERNAL MEDICINE

## 2019-03-22 ENCOUNTER — HOSPITAL ENCOUNTER (OUTPATIENT)
Dept: CARDIOLOGY | Facility: HOSPITAL | Age: 49
Discharge: HOME | End: 2019-03-22

## 2019-03-22 ENCOUNTER — OFFICE VISIT (OUTPATIENT)
Dept: CARDIOLOGY | Facility: CLINIC | Age: 49
End: 2019-03-22

## 2019-03-22 VITALS
WEIGHT: 195 LBS | HEART RATE: 103 BPM | RESPIRATION RATE: 18 BRPM | SYSTOLIC BLOOD PRESSURE: 134 MMHG | DIASTOLIC BLOOD PRESSURE: 80 MMHG | HEIGHT: 63 IN | BODY MASS INDEX: 34.55 KG/M2

## 2019-03-22 DIAGNOSIS — R00.2 PALPITATIONS: ICD-10-CM

## 2019-03-22 DIAGNOSIS — R07.2 PRECORDIAL PAIN: ICD-10-CM

## 2019-03-22 DIAGNOSIS — R07.2 PRECORDIAL PAIN: Primary | ICD-10-CM

## 2019-03-22 DIAGNOSIS — R93.89 ABNORMAL CT OF THE CHEST: ICD-10-CM

## 2019-03-22 LAB
ASCENDING AORTA: 2.6 CM
BH CV ECHO MEAS - ACS: 1.6 CM
BH CV ECHO MEAS - AO MAX PG (FULL): 1.6 MMHG
BH CV ECHO MEAS - AO MAX PG: 5.5 MMHG
BH CV ECHO MEAS - AO MEAN PG (FULL): 0.92 MMHG
BH CV ECHO MEAS - AO MEAN PG: 3.4 MMHG
BH CV ECHO MEAS - AO ROOT AREA (BSA CORRECTED): 1.5
BH CV ECHO MEAS - AO ROOT AREA: 6.1 CM^2
BH CV ECHO MEAS - AO ROOT DIAM: 2.8 CM
BH CV ECHO MEAS - AO V2 MAX: 117.3 CM/SEC
BH CV ECHO MEAS - AO V2 MEAN: 86.7 CM/SEC
BH CV ECHO MEAS - AO V2 VTI: 21.1 CM
BH CV ECHO MEAS - AVA(I,A): 3.1 CM^2
BH CV ECHO MEAS - AVA(I,D): 3.1 CM^2
BH CV ECHO MEAS - AVA(V,A): 3.2 CM^2
BH CV ECHO MEAS - AVA(V,D): 3.2 CM^2
BH CV ECHO MEAS - BSA(HAYCOCK): 2 M^2
BH CV ECHO MEAS - BSA: 1.9 M^2
BH CV ECHO MEAS - BZI_BMI: 34.5 KILOGRAMS/M^2
BH CV ECHO MEAS - BZI_METRIC_HEIGHT: 160 CM
BH CV ECHO MEAS - BZI_METRIC_WEIGHT: 88.5 KG
BH CV ECHO MEAS - EDV(MOD-SP2): 47 ML
BH CV ECHO MEAS - EDV(MOD-SP4): 70 ML
BH CV ECHO MEAS - EDV(TEICH): 107.6 ML
BH CV ECHO MEAS - EF(CUBED): 67.4 %
BH CV ECHO MEAS - EF(MOD-BP): 55 %
BH CV ECHO MEAS - EF(MOD-SP2): 57.4 %
BH CV ECHO MEAS - EF(MOD-SP4): 52.9 %
BH CV ECHO MEAS - EF(TEICH): 58.9 %
BH CV ECHO MEAS - ESV(MOD-SP2): 20 ML
BH CV ECHO MEAS - ESV(MOD-SP4): 33 ML
BH CV ECHO MEAS - ESV(TEICH): 44.2 ML
BH CV ECHO MEAS - FS: 31.2 %
BH CV ECHO MEAS - IVS/LVPW: 1
BH CV ECHO MEAS - IVSD: 0.92 CM
BH CV ECHO MEAS - LAT PEAK E' VEL: 13 CM/SEC
BH CV ECHO MEAS - LV DIASTOLIC VOL/BSA (35-75): 36.6 ML/M^2
BH CV ECHO MEAS - LV MASS(C)D: 152.6 GRAMS
BH CV ECHO MEAS - LV MASS(C)DI: 79.8 GRAMS/M^2
BH CV ECHO MEAS - LV MAX PG: 4 MMHG
BH CV ECHO MEAS - LV MEAN PG: 2.5 MMHG
BH CV ECHO MEAS - LV SYSTOLIC VOL/BSA (12-30): 17.2 ML/M^2
BH CV ECHO MEAS - LV V1 MAX: 99.4 CM/SEC
BH CV ECHO MEAS - LV V1 MEAN: 74.9 CM/SEC
BH CV ECHO MEAS - LV V1 VTI: 17.4 CM
BH CV ECHO MEAS - LVIDD: 4.8 CM
BH CV ECHO MEAS - LVIDS: 3.3 CM
BH CV ECHO MEAS - LVLD AP2: 5.9 CM
BH CV ECHO MEAS - LVLD AP4: 7.3 CM
BH CV ECHO MEAS - LVLS AP2: 5.5 CM
BH CV ECHO MEAS - LVLS AP4: 6.1 CM
BH CV ECHO MEAS - LVOT AREA (M): 3.8 CM^2
BH CV ECHO MEAS - LVOT AREA: 3.8 CM^2
BH CV ECHO MEAS - LVOT DIAM: 2.2 CM
BH CV ECHO MEAS - LVPWD: 0.92 CM
BH CV ECHO MEAS - MED PEAK E' VEL: 8 CM/SEC
BH CV ECHO MEAS - MV A DUR: 0.1 SEC
BH CV ECHO MEAS - MV A MAX VEL: 57.2 CM/SEC
BH CV ECHO MEAS - MV DEC SLOPE: 409.6 CM/SEC^2
BH CV ECHO MEAS - MV DEC TIME: 0.14 SEC
BH CV ECHO MEAS - MV E MAX VEL: 54.3 CM/SEC
BH CV ECHO MEAS - MV E/A: 0.95
BH CV ECHO MEAS - MV MAX PG: 1.8 MMHG
BH CV ECHO MEAS - MV MEAN PG: 1.1 MMHG
BH CV ECHO MEAS - MV P1/2T MAX VEL: 55.8 CM/SEC
BH CV ECHO MEAS - MV P1/2T: 39.9 MSEC
BH CV ECHO MEAS - MV V2 MAX: 67.3 CM/SEC
BH CV ECHO MEAS - MV V2 MEAN: 49.3 CM/SEC
BH CV ECHO MEAS - MV V2 VTI: 13.7 CM
BH CV ECHO MEAS - MVA P1/2T LCG: 3.9 CM^2
BH CV ECHO MEAS - MVA(P1/2T): 5.5 CM^2
BH CV ECHO MEAS - MVA(VTI): 4.8 CM^2
BH CV ECHO MEAS - PA MAX PG (FULL): 4.1 MMHG
BH CV ECHO MEAS - PA MAX PG: 6.1 MMHG
BH CV ECHO MEAS - PA V2 MAX: 123.1 CM/SEC
BH CV ECHO MEAS - PULM A REVS DUR: 0.12 SEC
BH CV ECHO MEAS - PULM A REVS VEL: 32.4 CM/SEC
BH CV ECHO MEAS - PULM DIAS VEL: 50.9 CM/SEC
BH CV ECHO MEAS - PULM S/D: 1.4
BH CV ECHO MEAS - PULM SYS VEL: 69 CM/SEC
BH CV ECHO MEAS - PVA(V,A): 1.6 CM^2
BH CV ECHO MEAS - PVA(V,D): 1.6 CM^2
BH CV ECHO MEAS - QP/QS: 0.52
BH CV ECHO MEAS - RV MAX PG: 2 MMHG
BH CV ECHO MEAS - RV MEAN PG: 1.1 MMHG
BH CV ECHO MEAS - RV V1 MAX: 70.3 CM/SEC
BH CV ECHO MEAS - RV V1 MEAN: 48.2 CM/SEC
BH CV ECHO MEAS - RV V1 VTI: 12.1 CM
BH CV ECHO MEAS - RVOT AREA: 2.8 CM^2
BH CV ECHO MEAS - RVOT DIAM: 1.9 CM
BH CV ECHO MEAS - SI(AO): 66.9 ML/M^2
BH CV ECHO MEAS - SI(CUBED): 39 ML/M^2
BH CV ECHO MEAS - SI(LVOT): 34.5 ML/M^2
BH CV ECHO MEAS - SI(MOD-SP2): 14.1 ML/M^2
BH CV ECHO MEAS - SI(MOD-SP4): 19.3 ML/M^2
BH CV ECHO MEAS - SI(TEICH): 33.1 ML/M^2
BH CV ECHO MEAS - SUP REN AO DIAM: 1.7 CM
BH CV ECHO MEAS - SV(AO): 128 ML
BH CV ECHO MEAS - SV(CUBED): 74.6 ML
BH CV ECHO MEAS - SV(LVOT): 66 ML
BH CV ECHO MEAS - SV(MOD-SP2): 27 ML
BH CV ECHO MEAS - SV(MOD-SP4): 37 ML
BH CV ECHO MEAS - SV(RVOT): 34.1 ML
BH CV ECHO MEAS - SV(TEICH): 63.4 ML
BH CV ECHO MEAS - TAPSE (>1.6): 1.7 CM2
BH CV ECHO MEASUREMENTS AVERAGE E/E' RATIO: 5.17
BH CV XLRA - RV BASE: 3.2 CM
BH CV XLRA - TDI S': 12 CM/SEC
CREAT BLDA-MCNC: 1 MG/DL (ref 0.6–1.3)
LEFT ATRIUM VOLUME INDEX: 15 ML/M2
LEFT ATRIUM VOLUME: 28 CM3
LV EF 2D ECHO EST: 55 %
MAXIMAL PREDICTED HEART RATE: 174 BPM
SINUS: 2.5 CM
STJ: 2.6 CM
STRESS TARGET HR: 148 BPM

## 2019-03-22 PROCEDURE — 0 IOPAMIDOL PER 1 ML: Performed by: INTERNAL MEDICINE

## 2019-03-22 PROCEDURE — 93226 XTRNL ECG REC<48 HR SCAN A/R: CPT

## 2019-03-22 PROCEDURE — 93306 TTE W/DOPPLER COMPLETE: CPT | Performed by: INTERNAL MEDICINE

## 2019-03-22 PROCEDURE — 93000 ELECTROCARDIOGRAM COMPLETE: CPT | Performed by: INTERNAL MEDICINE

## 2019-03-22 PROCEDURE — 93306 TTE W/DOPPLER COMPLETE: CPT

## 2019-03-22 PROCEDURE — 93225 XTRNL ECG REC<48 HRS REC: CPT

## 2019-03-22 PROCEDURE — 71275 CT ANGIOGRAPHY CHEST: CPT

## 2019-03-22 PROCEDURE — 99204 OFFICE O/P NEW MOD 45 MIN: CPT | Performed by: INTERNAL MEDICINE

## 2019-03-22 PROCEDURE — 82565 ASSAY OF CREATININE: CPT

## 2019-03-22 RX ORDER — LEVOFLOXACIN 500 MG/1
500 TABLET, FILM COATED ORAL DAILY
Qty: 5 TABLET | Refills: 0 | Status: SHIPPED | OUTPATIENT
Start: 2019-03-22 | End: 2019-06-10

## 2019-03-22 RX ADMIN — IOPAMIDOL 95 ML: 755 INJECTION, SOLUTION INTRAVENOUS at 12:08

## 2019-03-22 NOTE — NURSING NOTE
Patient was in bay 8 for Chest CT hold and call.  Cardiology called and they came over to put a holter monitor on her.  Dr. Horn called and spoke with the patient.  She told her she could leave.  IV discontinued, gauze and paper tape applied to site. Ambulatory discharge to home.

## 2019-03-22 NOTE — PROGRESS NOTES
Date of Office Visit: 2019  Encounter Provider: Carmen Horn MD  Place of Service: [facility] CARDIOLOGY  Patient Name: Mary Banda  :1970    Chief complaint  Consult requested by Dr. Mccarty for evaluation of palpitations and chest pain.    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 Cisneros for evaluation of chest pain.  She had a stress echocardiogram that showed no ischemia at a good workload.  There was no valvular dysfunction and LV systolic function was normal at the time.      She was seen in the emergency room on 2019 with a 2-3-week history of midsternal chest discomfort that would radiate to her left anterior chest under her breast.  She states this is different from what she had in .  It can occur often at rest it is not clearly exertionally exacerbated.  There is no radiation to her arm jaw or back.  She has shortness of breath with this there is no nausea.  Associated with this is tachycardia.  She states that she has had palpitations and a tachycardia with heart rate elevated to 130-160 at times with minimal activity over the past several weeks.  She denies any fever chills cough abdominal pain nausea emesis melena diarrhea dysuria increased frequency of urination or malodorous urine.  No other focal symptoms are related to be is elicited.  Did take hydrochlorothiazide with some improvement in her symptoms of fatigue shortness of breath.  States that at 4:00 this morning she awoke from her sleep with similar symptoms associated with the pain radiating to her back.  This resolved after 20 minutes typically has been lasting less than 5 minutes.  Chest x-ray was unremarkable.  Blood work was unremarkable except for white count elevated at 14.7K.  Troponin was negative as was d-dimer and thyroid studies.    Past Medical History:   Diagnosis Date   • Breast  nodule     RIGHT   • DDD (degenerative disc disease), lumbar    • Heart palpitations     AT TIMES   • IBS (irritable bowel syndrome)    • Low back pain    • PONV (postoperative nausea and vomiting)    • Seasonal allergies      Past Surgical History:   Procedure Laterality Date   • BREAST BIOPSY Right 8/9/2017    Procedure: RIGHT BREAST BIOPSY WITH NEEDLE LOCALIZATION ;  Surgeon: Enrico Emerson MD;  Location: [facility];  Service:    • CHOLECYSTECTOMY  2002   • ENDOMETRIAL ABLATION W/ NOVASURE  2006   • OOPHORECTOMY Left 1994   • TUBAL ABDOMINAL LIGATION  1995     Outpatient Medications Prior to Visit   Medication Sig Dispense Refill   • cyclobenzaprine (FLEXERIL) 5 MG tablet Take 1 tablet by mouth 3 (Three) Times a Day As Needed for Muscle Spasms. 90 tablet 1   • fluticasone (FLONASE) 50 MCG/ACT nasal spray 2 sprays into the nostril(s) as directed by provider Daily. 16 g 3   • hydrochlorothiazide (MICROZIDE) 12.5 MG capsule Take 1 capsule by mouth Every Morning. 90 capsule 3   • ibuprofen (ADVIL,MOTRIN) 800 MG tablet Take 800 mg by mouth As Needed.     • rOPINIRole (REQUIP) 1 MG tablet Take 1 tablet by mouth Every Night. Take 1 hour before bedtime. 90 tablet 1     No facility-administered medications prior to visit.        Allergies as of 03/22/2019 - Reviewed 03/22/2019   Allergen Reaction Noted   • Sulfa antibiotics Rash 06/28/2016     Social History     Socioeconomic History   • Marital status:      Spouse name: Not on file   • Number of children: Not on file   • Years of education: Not on file   • Highest education level: Not on file   Occupational History   • Occupation: LPN     Comment: [employer] March 2017   Tobacco Use   • Smoking status: Current Every Day Smoker     Packs/day: 0.50     Years: 25.00     Pack years: 12.50     Types: Cigarettes     Start date: 1991   • Smokeless tobacco: Never Used   • Tobacco comment: Caffeine - 3-4 diet pepsi   Substance and Sexual Activity   • Alcohol use: No      Frequency: Never     Comment: social   • Drug use: No   • Sexual activity: Yes     Partners: Male     Birth control/protection: Surgical     Family History   Problem Relation Age of Onset   • Thyroid disease Mother    • Heart disease Mother    • Depression Mother    • Hypothyroidism Mother    • Stroke Father    • Hyperlipidemia Father    • Heart disease Father    • Diabetes Father    • Heart attack Father    • Lupus Sister    • Heart disease Maternal Grandmother    • Alcohol abuse Maternal Grandmother    • Lung cancer Maternal Grandmother    • Stroke Maternal Grandmother    • Heart disease Maternal Grandfather    • Heart attack Maternal Grandfather    • Heart disease Paternal Grandmother    • Lung cancer Paternal Grandmother    • Aneurysm Sister    • Heart disease Sister    • Cerebral aneurysm Sister    • Depression Daughter    • Hypertension Daughter    • Hypothyroidism Daughter    • Diabetes Paternal Aunt    • Diabetes Paternal Uncle      Review of Systems   Constitution: Positive for malaise/fatigue. Negative for fever, weight gain and weight loss.   HENT: Negative for ear pain, hearing loss, nosebleeds and sore throat.    Eyes: Negative for double vision, pain, vision loss in left eye and vision loss in right eye.   Cardiovascular:        See history of present illness.   Respiratory: Positive for cough, shortness of breath and snoring. Negative for sleep disturbances due to breathing and wheezing.    Endocrine: Negative for cold intolerance, heat intolerance and polyuria.   Skin: Negative for itching, poor wound healing and rash.   Musculoskeletal: Negative for joint pain, joint swelling and myalgias.   Gastrointestinal: Negative for abdominal pain, diarrhea, hematochezia, nausea and vomiting.   Genitourinary: Negative for hematuria and hesitancy.   Neurological: Positive for excessive daytime sleepiness. Negative for numbness, paresthesias and seizures.   Psychiatric/Behavioral: Negative for depression. The  "patient is not nervous/anxious.         Objective:     Vitals:    03/22/19 0931 03/22/19 0934   BP: 128/88 134/80   BP Location: Left arm    Pulse: 103    Resp: 18    Weight: 88.5 kg (195 lb)    Height: 160 cm (63\")      Body mass index is 34.54 kg/m².    Physical Exam   Constitutional: She is oriented to person, place, and time. She appears well-developed and well-nourished.   Obese   HENT:   Head: Normocephalic.   Nose: Nose normal.   Mouth/Throat: Oropharynx is clear and moist.   Eyes: Conjunctivae and EOM are normal. Pupils are equal, round, and reactive to light. Right eye exhibits no discharge. No scleral icterus.   Neck: Normal range of motion. Neck supple. No JVD present. No thyromegaly present.   Cardiovascular: Normal rate, regular rhythm, normal heart sounds and intact distal pulses. Exam reveals no gallop and no friction rub.   No murmur heard.  Pulses:       Carotid pulses are 2+ on the right side, and 2+ on the left side.       Radial pulses are 2+ on the right side, and 2+ on the left side.        Femoral pulses are 2+ on the right side, and 2+ on the left side.       Popliteal pulses are 2+ on the right side, and 2+ on the left side.        Dorsalis pedis pulses are 2+ on the right side, and 2+ on the left side.        Posterior tibial pulses are 2+ on the right side, and 2+ on the left side.   Pulmonary/Chest: Effort normal and breath sounds normal. No respiratory distress. She has no wheezes. She has no rales.   Abdominal: Soft. Bowel sounds are normal. She exhibits no distension. There is no hepatosplenomegaly. There is no tenderness. There is no rebound.   Musculoskeletal: Normal range of motion. She exhibits no edema or tenderness.   Neurological: She is alert and oriented to person, place, and time.   Skin: Skin is warm and dry. No rash noted. No erythema.   Psychiatric: She has a normal mood and affect. Her behavior is normal. Judgment and thought content normal.   Vitals reviewed.    Lab " Review:     ECG 12 Lead  Date/Time: 3/22/2019 9:41 PM  Performed by: Carmen Horn MD  Authorized by: Carmen Horn MD   Comparison: compared with previous ECG   Comparison to previous ECG: PAC's resolved  Rhythm: sinus tachycardia    Clinical impression: abnormal EKG          Assessment:       Diagnosis Plan   1. Precordial pain  Adult Transthoracic Echo Complete W/ Cont if Necessary Per Protocol    CT Angiogram Chest With & Without Contrast    Ambulatory Referral to Pulmonology   2. Palpitations  Holter Monitor - 48 Hour   3. Abnormal CT of the chest  Ambulatory Referral to Pulmonology     Plan:       1.  Chest pain.  Atypical and anginal features.  We will check an echocardiogram.  There episode this morning seems more concerning as well as the tachycardia and exertional dyspnea.  Will check a CT angiogram of the chest for pulmonary emboli.  In addition we will check an echocardiogram and place a 48 Holter  2.  Tachycardia.  As above.  She really does not give any other focal motor or sensory deficits we will check an echocardiogram.  3.  Family history of tachycardia mediated cardiomyopathy  4.  Family history of premature atherosclerotic disease in father  5.  Have a family history of cerebral aneurysmal disease    Contacted by Dr. Preston that CT angiogram patchy infiltrates noted on the right lung base.  We will start her on Levaquin and have her see pulmonary next week. I've asked her not to exercise or work given tachycardia.     Your medication list           Accurate as of 3/22/19 11:59 PM. If you have any questions, ask your nurse or doctor.               START taking these medications      Instructions Last Dose Given Next Dose Due   levoFLOXacin 500 MG tablet  Commonly known as:  LEVAQUIN  Started by:  Carmen Horn MD      Take 1 tablet by mouth Daily.          CONTINUE taking these medications      Instructions Last Dose Given Next Dose Due   cyclobenzaprine 5 MG tablet  Commonly known as:  FLEXERIL       Take 1 tablet by mouth 3 (Three) Times a Day As Needed for Muscle Spasms.       fluticasone 50 MCG/ACT nasal spray  Commonly known as:  FLONASE      2 sprays into the nostril(s) as directed by provider Daily.       hydrochlorothiazide 12.5 MG capsule  Commonly known as:  MICROZIDE      Take 1 capsule by mouth Every Morning.       ibuprofen 800 MG tablet  Commonly known as:  ADVIL,MOTRIN      Take 800 mg by mouth As Needed.       rOPINIRole 1 MG tablet  Commonly known as:  REQUIP      Take 1 tablet by mouth Every Night. Take 1 hour before bedtime.             Where to Get Your Medications      These medications were sent to [facility] - [place], [place] - [address] - [phone]  - [phone] [address] [address]    Phone:  [phone]   · levoFLOXacin 500 MG tablet       Dictated utilizing Dragon dictation

## 2019-03-24 PROBLEM — R00.2 PALPITATIONS: Status: ACTIVE | Noted: 2019-03-24

## 2019-03-24 PROBLEM — R07.2 PRECORDIAL PAIN: Status: ACTIVE | Noted: 2019-03-24

## 2019-03-24 PROBLEM — R93.89 ABNORMAL CT OF THE CHEST: Status: ACTIVE | Noted: 2019-03-24

## 2019-03-27 ENCOUNTER — TELEPHONE (OUTPATIENT)
Dept: CARDIOLOGY | Facility: CLINIC | Age: 49
End: 2019-03-27

## 2019-03-28 ENCOUNTER — TELEPHONE (OUTPATIENT)
Dept: CARDIOLOGY | Facility: CLINIC | Age: 49
End: 2019-03-28

## 2019-03-28 PROCEDURE — 93227 XTRNL ECG REC<48 HR R&I: CPT | Performed by: INTERNAL MEDICINE

## 2019-04-02 ENCOUNTER — TELEPHONE (OUTPATIENT)
Dept: CARDIOLOGY | Facility: CLINIC | Age: 49
End: 2019-04-02

## 2019-04-02 DIAGNOSIS — Z87.898 HISTORY OF CHEST PAIN: Primary | ICD-10-CM

## 2019-04-02 DIAGNOSIS — R00.2 PALPITATIONS: ICD-10-CM

## 2019-04-03 NOTE — TELEPHONE ENCOUNTER
Please call patient and help her get scheduled for exercise treadmill stress test within the next couple of weeks.    Please have her schedule a six-month follow-up appointment with myself or Dr. Horn.

## 2019-04-03 NOTE — TELEPHONE ENCOUNTER
I called and discussed monitor results with patient.  She reports that her palpitations have improved since last visit.  They are now only occurring occasionally.  We discussed avoiding stimulants such as caffeine, alcohol, Sudafed, dark chocolate.    At this point she is over her pneumonia.  She is feeling up to doing the treadmill stress test.  I will place order and she will call us if she has not gotten a phone call to schedule this by Monday.  We will also have her schedule six-month follow-up appointment at this time.    We discussed that if her palpitations start occurring more often or become more severe we could try her on a low-dose of metoprolol.  She will notify the office if this happens or for any other new, recurrent, or worsening symptoms or other issues/concerns.

## 2019-04-03 NOTE — TELEPHONE ENCOUNTER
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  Would have her avoid stimulants and if she still having palpitations can add low-dose Toprol-XL 25 mg a day.  If she has recovered from her pneumonia would proceed with treadmill exercise stress test prior to starting the Toprol. Thanks, Mini

## 2019-04-04 DIAGNOSIS — R07.2 PRECORDIAL PAIN: Primary | ICD-10-CM

## 2019-04-04 NOTE — TELEPHONE ENCOUNTER
Spoke with pt.  Dr Pleitez added Breo and SOA is better.  She is to return in 4 weeks.  She did not know if she was clear for Stress Test.  Dr Pleitez was out of the office until Monday.  I will call him then if you want to discuss.  I did ask for office note to see if this has any information.

## 2019-04-04 NOTE — TELEPHONE ENCOUNTER
If his office note says it is clear to proceed with stress test and go ahead and have her do so.  I have already placed the order.  If the note does not indicate this, please contact his office on Monday to get clearance for the stress test.marvin

## 2019-04-04 NOTE — TELEPHONE ENCOUNTER
Please find out if the pulmonologist did anything different or had additional recommendations or cleared her for the stress test.  Find out how is her breathing?.  Will place a stress test but will need to defer this if  Still short of breath or pulmonary did not wish her to do this at this point. marvin

## 2019-04-05 NOTE — TELEPHONE ENCOUNTER
Received Dr Pleitez's note.  There is no clear direction that the pt is ok to have Stress Test.  LM with his office to have him call to clarify.

## 2019-04-08 ENCOUNTER — HOSPITAL ENCOUNTER (OUTPATIENT)
Dept: CARDIOLOGY | Facility: HOSPITAL | Age: 49
Discharge: HOME | End: 2019-04-08
Admitting: NURSE PRACTITIONER

## 2019-04-08 DIAGNOSIS — Z87.898 HISTORY OF CHEST PAIN: ICD-10-CM

## 2019-04-08 DIAGNOSIS — R00.2 PALPITATIONS: ICD-10-CM

## 2019-04-08 LAB
BH CV STRESS BP STAGE 1: NORMAL
BH CV STRESS BP STAGE 2: NORMAL
BH CV STRESS DURATION MIN STAGE 1: 3
BH CV STRESS DURATION MIN STAGE 2: 3
BH CV STRESS DURATION MIN STAGE 3: 2
BH CV STRESS DURATION SEC STAGE 1: 0
BH CV STRESS DURATION SEC STAGE 2: 0
BH CV STRESS DURATION SEC STAGE 3: 0
BH CV STRESS GRADE STAGE 1: 10
BH CV STRESS GRADE STAGE 2: 12
BH CV STRESS GRADE STAGE 3: 14
BH CV STRESS HR STAGE 1: 133
BH CV STRESS HR STAGE 2: 158
BH CV STRESS HR STAGE 3: 171
BH CV STRESS METS STAGE 1: 5
BH CV STRESS METS STAGE 2: 7.5
BH CV STRESS METS STAGE 3: 10
BH CV STRESS PROTOCOL 1: NORMAL
BH CV STRESS RECOVERY BP: NORMAL MMHG
BH CV STRESS RECOVERY HR: 98 BPM
BH CV STRESS SPEED STAGE 1: 1.7
BH CV STRESS SPEED STAGE 2: 2.5
BH CV STRESS SPEED STAGE 3: 3.4
BH CV STRESS STAGE 1: 1
BH CV STRESS STAGE 2: 2
BH CV STRESS STAGE 3: 3
MAXIMAL PREDICTED HEART RATE: 174 BPM
PERCENT MAX PREDICTED HR: 98.28 %
STRESS BASELINE BP: NORMAL MMHG
STRESS BASELINE HR: 81 BPM
STRESS PERCENT HR: 116 %
STRESS POST ESTIMATED WORKLOAD: 9 METS
STRESS POST EXERCISE DUR MIN: 8 MIN
STRESS POST EXERCISE DUR SEC: 0 SEC
STRESS POST PEAK BP: NORMAL MMHG
STRESS POST PEAK HR: 171 BPM
STRESS TARGET HR: 148 BPM

## 2019-04-08 PROCEDURE — 93018 CV STRESS TEST I&R ONLY: CPT | Performed by: INTERNAL MEDICINE

## 2019-04-08 PROCEDURE — 93017 CV STRESS TEST TRACING ONLY: CPT

## 2019-04-08 PROCEDURE — 93016 CV STRESS TEST SUPVJ ONLY: CPT | Performed by: INTERNAL MEDICINE

## 2019-04-11 NOTE — TELEPHONE ENCOUNTER
Please let patient know that her stress test is normal.  Have her call back of chest pain recurs.. marvin

## 2019-04-15 ENCOUNTER — TELEPHONE (OUTPATIENT)
Dept: CARDIOLOGY | Facility: CLINIC | Age: 49
End: 2019-04-15

## 2019-04-15 NOTE — TELEPHONE ENCOUNTER
Pt called re: work note wanted for 3/21 and 3/22.  Is this ok?  I will do this if you give me the ok to do so.

## 2019-06-10 ENCOUNTER — OFFICE VISIT (OUTPATIENT)
Dept: FAMILY MEDICINE CLINIC | Facility: CLINIC | Age: 49
End: 2019-06-10

## 2019-06-10 VITALS
TEMPERATURE: 98.1 F | HEIGHT: 63 IN | OXYGEN SATURATION: 97 % | RESPIRATION RATE: 17 BRPM | HEART RATE: 104 BPM | SYSTOLIC BLOOD PRESSURE: 132 MMHG | BODY MASS INDEX: 34.12 KG/M2 | WEIGHT: 192.6 LBS | DIASTOLIC BLOOD PRESSURE: 74 MMHG

## 2019-06-10 DIAGNOSIS — M25.561 CHRONIC PAIN OF RIGHT KNEE: Primary | ICD-10-CM

## 2019-06-10 DIAGNOSIS — G89.29 CHRONIC PAIN OF RIGHT KNEE: Primary | ICD-10-CM

## 2019-06-10 PROCEDURE — 99214 OFFICE O/P EST MOD 30 MIN: CPT | Performed by: FAMILY MEDICINE

## 2019-06-10 NOTE — PROGRESS NOTES
Subjective   Mary Banda is a 48 y.o. female. Presents today to be evaluated for right knee pain x 2 months.     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  She is up all day on the knee.  She says been going on for approximately 2 months.  She tends to walk the right leg a little bit.  She says that her dog will tend to bump into it from behind and cause a twofold but not causing her to fall.  She also says she tends to sit on her leg when she sits and has a lot of pain when she has to extend the leg out from underneath her.  Denies taking any medication for relief.  Nothing makes it better or worse.  Pain is mostly located on the lateral and posterior portions of the knee when it does happen.    The following portions of the patient's history were reviewed and updated as appropriate: allergies, current medications, past family history, past medical history, past social history, past surgical history and problem list.    Review of Systems   Constitutional: Negative for activity change, appetite change, fatigue and fever.   HENT: Negative for ear pain, facial swelling and sore throat.    Eyes: Negative for discharge and itching.   Respiratory: Negative for cough, chest tightness, shortness of breath and wheezing.    Cardiovascular: Negative for chest pain and palpitations.   Gastrointestinal: Negative for abdominal distention, constipation and nausea.   Endocrine: Negative for polydipsia, polyphagia and polyuria.   Genitourinary: Negative for difficulty urinating and flank pain.   Musculoskeletal: Positive for arthralgias. Negative for back pain.        Knee pain (right)   Skin: Negative for color change, rash and wound.   Allergic/Immunologic: Negative for environmental allergies and food allergies.   Neurological: Negative for weakness and numbness.   Hematological: Negative for adenopathy. Does not bruise/bleed easily.    Psychiatric/Behavioral: Negative for decreased concentration and dysphoric mood. The patient is not nervous/anxious.        Objective   Physical Exam   Constitutional: She is oriented to person, place, and time. She appears well-developed and well-nourished. No distress.   HENT:   Mouth/Throat: Oropharynx is clear and moist. No oropharyngeal exudate.   Eyes: Conjunctivae are normal. Right eye exhibits no discharge. Left eye exhibits no discharge.   Neck: Normal range of motion. Neck supple.   Cardiovascular: Normal rate, regular rhythm and normal heart sounds. Exam reveals no gallop and no friction rub.   No murmur heard.  Pulmonary/Chest: Effort normal and breath sounds normal. She has no wheezes. She has no rales.   Abdominal: Soft. Bowel sounds are normal. She exhibits no distension. There is no tenderness.   Musculoskeletal: She exhibits no edema or deformity.        Right hip: Normal.        Left hip: Normal.        Right knee: Normal.        Left knee: Normal.        Right ankle: Normal.        Left ankle: Normal.   Lymphadenopathy:     She has no cervical adenopathy.   Neurological: She is alert and oriented to person, place, and time.   Skin: Skin is warm and dry. No rash noted.   Psychiatric: She has a normal mood and affect. Her behavior is normal.   Nursing note and vitals reviewed.  XR knee: Normal x-ray.  I reviewed these images myself.    Assessment/Plan   Mary was seen today for knee pain.    Diagnoses and all orders for this visit:    Chronic pain of right knee  -     XR Knee 1 or 2 View Right      I do not think the patient needs an injection right now.  I do think she needs to change some of the ways that she sits.  I told her that sitting on her other leg is increased in the torque on her knee and over time it is going to cause some wear and tear on her joints and insertions of the tendons.  I told her she really needs to remember how to sit correctly and avoid sitting like that.  If she does  not get better over.  Time to come back and let me know and we will see if she needs some either physical therapy and/or an injection.

## 2019-06-10 NOTE — PATIENT INSTRUCTIONS
Knee Pain, Adult  Knee pain in adults is common. It can be caused by many things, including:  · Arthritis.  · A fluid-filled sac (cyst) or growth in your knee.  · An infection in your knee.  · An injury that will not heal.  · Damage, swelling, or irritation of the tissues that support your knee.    Knee pain is usually not a sign of a serious problem. The pain may go away on its own with time and rest. If it does not, a health care provider may order tests to find the cause of the pain. These may include:  · Imaging tests, such as an X-ray, MRI, or ultrasound.  · Joint aspiration. In this test, fluid is removed from the knee.  · Arthroscopy. In this test, a lighted tube is inserted into knee and an image is projected onto a TV screen.  · A biopsy. In this test, a sample of tissue is removed from the body and studied under a microscope.    Follow these instructions at home:  Pay attention to any changes in your symptoms. Take these actions to relieve your pain.  Activity  · Rest your knee.  · Do not do things that cause pain or make pain worse.  · Avoid high-impact activities or exercises, such as running, jumping rope, or doing jumping jacks.  General instructions  · Take over-the-counter and prescription medicines only as told by your health care provider.  · Raise (elevate) your knee above the level of your heart when you are sitting or lying down.  · Sleep with a pillow under your knee.  · If directed, apply ice to the knee:  ? Put ice in a plastic bag.  ? Place a towel between your skin and the bag.  ? Leave the ice on for 20 minutes, 2-3 times a day.  · Ask your health care provider if you should wear an elastic knee support.  · Lose weight if you are overweight. Extra weight can put pressure on your knee.  · Do not use any products that contain nicotine or tobacco, such as cigarettes and e-cigarettes. Smoking may slow the healing of any bone and joint problems that you may have. If you need help quitting, ask  your health care provider.  Contact a health care provider if:  · Your knee pain continues, changes, or gets worse.  · You have a fever along with knee pain.  · Your knee janeth or locks up.  · Your knee swells, and the swelling becomes worse.  Get help right away if:  · Your knee feels warm to the touch.  · You cannot move your knee.  · You have severe pain in your knee.  · You have chest pain.  · You have trouble breathing.  Summary  · Knee pain in adults is common. It can be caused by many things, including, arthritis, infection, cysts, or injury.  · Knee pain is usually not a sign of a serious problem, but if it does not go away, a health care provider may perform tests to know the cause of the pain.  · Pay attention to any changes in your symptoms. Relieve your pain with rest, medicines, light activity, and use of ice.  · Get help if your pain continues or becomes very severe, or if your knee janeth or locks up, or if you have chest pain or trouble breathing.  This information is not intended to replace advice given to you by your health care provider. Make sure you discuss any questions you have with your health care provider.  Document Released: 10/14/2008 Document Revised: 12/08/2017 Document Reviewed: 12/08/2017  [employer] Patient Education © 2019 [employer]

## 2019-08-29 DIAGNOSIS — G25.81 RESTLESS LEG SYNDROME, UNCONTROLLED: ICD-10-CM

## 2019-08-29 RX ORDER — ROPINIROLE 1 MG/1
1 TABLET, FILM COATED ORAL NIGHTLY
Qty: 90 TABLET | Refills: 3 | Status: SHIPPED | OUTPATIENT
Start: 2019-08-29 | End: 2020-08-25

## 2019-11-26 DIAGNOSIS — R60.0 LOCALIZED EDEMA: ICD-10-CM

## 2019-11-26 RX ORDER — HYDROCHLOROTHIAZIDE 12.5 MG/1
12.5 CAPSULE, GELATIN COATED ORAL EVERY MORNING
Qty: 30 CAPSULE | Refills: 0 | Status: SHIPPED | OUTPATIENT
Start: 2019-11-26

## 2019-11-26 RX ORDER — HYDROCHLOROTHIAZIDE 12.5 MG/1
12.5 CAPSULE, GELATIN COATED ORAL EVERY MORNING
Qty: 30 CAPSULE | Refills: 0 | Status: SHIPPED | OUTPATIENT
Start: 2019-11-26 | End: 2019-11-26 | Stop reason: SDUPTHER

## 2020-01-14 ENCOUNTER — OFFICE VISIT (OUTPATIENT)
Dept: FAMILY MEDICINE CLINIC | Facility: CLINIC | Age: 50
End: 2020-01-14

## 2020-01-14 VITALS
DIASTOLIC BLOOD PRESSURE: 68 MMHG | HEART RATE: 81 BPM | TEMPERATURE: 98.3 F | BODY MASS INDEX: 35.3 KG/M2 | WEIGHT: 199.2 LBS | OXYGEN SATURATION: 99 % | HEIGHT: 63 IN | SYSTOLIC BLOOD PRESSURE: 120 MMHG

## 2020-01-14 DIAGNOSIS — S69.91XS INJURY OF RIGHT HAND, SEQUELA: ICD-10-CM

## 2020-01-14 DIAGNOSIS — M79.644 CHRONIC PAIN OF RIGHT THUMB: ICD-10-CM

## 2020-01-14 DIAGNOSIS — G89.29 CHRONIC PAIN OF RIGHT THUMB: ICD-10-CM

## 2020-01-14 DIAGNOSIS — R93.89 ABNORMAL CHEST CT: ICD-10-CM

## 2020-01-14 DIAGNOSIS — M79.644 PAIN OF RIGHT MIDDLE FINGER: Primary | ICD-10-CM

## 2020-01-14 PROCEDURE — 99213 OFFICE O/P EST LOW 20 MIN: CPT | Performed by: PHYSICIAN ASSISTANT

## 2020-01-14 NOTE — PROGRESS NOTES
Subjective   Mary Banda is a 49 y.o. female presented today with right hand pain for 6 months.  Also has abnormal CT chest in the past that needs follow-up    History of Present Illness     Patient has pain of the right thumb and middle finger for 6 months.  She was previously seen by [facility] for her thumb but is now having pain in the right middle finger following.  It is swollen and looks different than her other middle finger.  She is right-hand dominant.      Patient also reports an abnormal CT chest that was supposed to have follow-up.  She has not heard about follow-up and has not had repeat CT chest.    The following portions of the patient's history were reviewed and updated as appropriate: allergies, current medications, past family history, past medical history, past social history, past surgical history and problem list.    Review of Systems   Constitutional: Negative.    HENT: Negative.    Respiratory:        Abnormal CT chest   Cardiovascular: Negative.    Gastrointestinal: Negative.    Musculoskeletal:        Right hand pain        Objective    Vitals:    01/14/20 0651   BP: 120/68   Pulse: 81   Temp: 98.3 °F (36.8 °C)   SpO2: 99%     Body mass index is 35.3 kg/m².    Physical Exam   Constitutional: She is oriented to person, place, and time. She appears well-developed and well-nourished.   HENT:   Head: Normocephalic and atraumatic.   Right Ear: External ear normal.   Left Ear: External ear normal.   Nose: Nose normal.   Eyes: Conjunctivae and lids are normal.   Neck: Neck supple. Carotid bruit is not present.   Cardiovascular: Normal rate, regular rhythm, normal heart sounds and intact distal pulses. Exam reveals no gallop and no friction rub.   No murmur heard.  Pulmonary/Chest: Effort normal and breath sounds normal. No respiratory distress. She has no wheezes. She has no rhonchi. She has no rales.   Musculoskeletal: She exhibits no edema or deformity.   Neurological: She is alert and  oriented to person, place, and time. Gait normal.   Skin: Skin is warm and dry.   Psychiatric: She has a normal mood and affect. Her speech is normal and behavior is normal. Judgment and thought content normal. Cognition and memory are normal.   Nursing note and vitals reviewed.      Assessment/Plan   Mary was seen today for hand pain.    Diagnoses and all orders for this visit:    Pain of right middle finger  -     Ambulatory Referral to Hand Surgery    Injury of right hand, sequela  -     Ambulatory Referral to Hand Surgery    Chronic pain of right thumb  -     Ambulatory Referral to Hand Surgery    Abnormal chest CT  -     CT Chest Without Contrast; Future      Patient Instructions   Assessment and plan  49-year-old female who presents today with pain of the right thumb and pain and swelling of the middle finger for 6 months.  She was previously seen by [facility] for her thumb pain but is now having pain in the right middle finger following.  It is swollen and looks different than her other middle finger.  She is right-hand dominant.  She does have significant diffuse swelling and asymmetry of the right middle finger in comparison to the left middle finger.  I will refer to hand surgery for evaluation, further work-up, and treatment.    Patient also reports an abnormal CT chest that was supposed to have follow-up.  She had CTA chest with Dr. Horn March 2019 with multifocal small nodular areas of ground glass infiltrate within the right lower lobe, all less than 2 cm. The appearance suggested  atypical pneumonia and recommended short-term followup in 4-6 weeks.  She has not had repeat CT.  I will refer for CT chest today.

## 2020-01-28 ENCOUNTER — HOSPITAL ENCOUNTER (OUTPATIENT)
Dept: CT IMAGING | Facility: HOSPITAL | Age: 50
Discharge: HOME | End: 2020-01-28
Admitting: PHYSICIAN ASSISTANT

## 2020-01-28 DIAGNOSIS — R93.89 ABNORMAL CHEST CT: ICD-10-CM

## 2020-01-28 PROCEDURE — 71250 CT THORAX DX C-: CPT

## 2020-01-28 NOTE — PATIENT INSTRUCTIONS
Assessment and plan  49-year-old female who presents today with pain of the right thumb and pain and swelling of the middle finger for 6 months.  She was previously seen by [facility] for her thumb pain but is now having pain in the right middle finger following.  It is swollen and looks different than her other middle finger.  She is right-hand dominant.  She does have significant diffuse swelling and asymmetry of the right middle finger in comparison to the left middle finger.  I will refer to hand surgery for evaluation, further work-up, and treatment.    Patient also reports an abnormal CT chest that was supposed to have follow-up.  She had CTA chest with Dr. Horn March 2019 with multifocal small nodular areas of ground glass infiltrate within the right lower lobe, all less than 2 cm. The appearance suggested  atypical pneumonia and recommended short-term followup in 4-6 weeks.  She has not had repeat CT.  I will refer for CT chest today.

## 2020-03-23 ENCOUNTER — E-VISIT (OUTPATIENT)
Dept: FAMILY MEDICINE CLINIC | Facility: CLINIC | Age: 50
End: 2020-03-23

## 2020-03-23 DIAGNOSIS — J06.9 ACUTE URI: Primary | ICD-10-CM

## 2020-03-23 PROCEDURE — 99422 OL DIG E/M SVC 11-20 MIN: CPT | Performed by: PHYSICIAN ASSISTANT

## 2020-03-23 RX ORDER — ALBUTEROL SULFATE 90 UG/1
2 AEROSOL, METERED RESPIRATORY (INHALATION) EVERY 4 HOURS PRN
Qty: 1 INHALER | Refills: 0 | Status: SHIPPED | OUTPATIENT
Start: 2020-03-23 | End: 2020-07-09

## 2020-03-23 NOTE — PROGRESS NOTES
Mary Banda    1970  [id number]    I have reviewed the e-Visit questionnaire and patient's answers, my assessment and plan are as follows:    HPI    Patient reports cough, shortness of breath, nasal congestion, pain around the nose and face, headache, ear pain, tenderness in the front and back of the neck, sore or scratchy throat, sneezing, and watery eyes for a few days as well as feeling warm without documented fever.  She is a smoker but has no chronic underlying heart or lung conditions or diabetes.  She has not traveled in the last 14 days or had contact with anyone known to have the 2019 Novel corona virus or anyone being tested for the 2019 Novel coronavirus.  She has tried nose spray and allergy medication without relief.    Review of Systems - General ROS: positive for  -feeling hot  Ophthalmic ROS: positive for - excessive tearing  ENT ROS: positive for - headaches, nasal congestion, sinus pain, sneezing and sore throat  Respiratory ROS: positive for - cough and shortness of breath      Diagnoses and all orders for this visit:    Acute URI  -     albuterol sulfate  (90 Base) MCG/ACT inhaler; Inhale 2 puffs Every 4 (Four) Hours As Needed for Shortness of Air (Cough).      Patient Instructions   Assessment and plan  49-year-old female who is being evaluated today for Evisit.  She has had cough, shortness of breath, nasal congestion, pain around her nose and face, headache, ear pain, tenderness in the front and back of her neck, sore throat, sneezing, and watery eyes for a few days.  She has no documented fever but is feeling warm.  She is a smoker but has no chronic underlying harder pulmonary conditions or diabetes.  She has not traveled in the last 14 days or had contact with anyone known to COVID 19 or being evaluated for COVID 19.  She has tried no spray and allergy medication.  She should continue Flonase or Nasacort 2 sprays each nostril once daily, and Claritin 10 mg, Zyrtec 10 mg, Xyzal  5 mg, or Allegra 180 mg once daily, as well as Mucinex DM twice daily.  I will send in an albuterol inhaler to use as needed for cough, chest tightness, or shortness of breath.  I advise that she self quarantine for 14 days and contact the [facility] department for any further recommendations at [phone].  She should contact our office or go to the urgent care or ER ASAP if worsening, new or changing symptoms.  If she has any worsening shortness of breath or respiratory symptoms, she should be seen at immediately. About 15 minutes spent reviewing chart and my chart encounter/E visit, medical decision-making, and treatment plan.    How to Quarantine at Home  Information for Patients and Families    These instructions are for people with confirmed or suspected COVID-19 who do not need to be hospitalized and those with confirmed COVID-19 who were hospitalized and discharged to care for themselves at home.    If you were tested through the Health Department  The Health Department will monitor your wellbeing.  If it is determined that you do not need to be hospitalized and can be isolated at home, you will be monitored by staff from your local or state health department.     If you were tested through a Commercial Lab  You will need to monitor yourself and report changes in your symptoms to your doctor.  See the section below called Monitor Your Symptoms.    Follow these steps until a healthcare provider or local or [facility] health department says you can return to your normal activities.    Stay home except to get medical care  • Restrict activities outside your home, except for getting medical care.   • Do not go to work, school, or public areas.   • Avoid using public transportation, ride-sharing, or taxis.    Separate yourself from other people and animals in your home  People  As much as possible, you should stay in a specific room and away from other people in your home. Also, you should use a separate  bathroom, if available.    Animals  You should restrict contact with pets and other animals while you are sick with COVID-19, just like you would around other people. When possible, have another member of your household care for your animals while you are sick. If you are sick with COVID-19, avoid contact with your pet, including petting, snuggling, being kissed or licked, and sharing food. If you must care for your pet or be around animals while you are sick, wash your hands before and after you interact with pets and wear a facemask. See COVID-19 and Animals for more information.    Call ahead before visiting your doctor  If you have a medical appointment, call the healthcare provider and tell them that you have or may have COVID-19. This information will help the healthcare provider’s office take steps to keep other people from getting infected or exposed.    Wear a facemask  You should wear a facemask when you are around other people (e.g., sharing a room or vehicle) or pets and before you enter a healthcare provider’s office.     If you are not able to wear a facemask (for example, because it causes trouble breathing), then people who live with you should not stay in the same room with you, or they should wear a facemask if they enter your room.    Cover your coughs and sneezes  • Cover your mouth and nose with a tissue when you cough or sneeze.   • Throw used tissues in a lined trash can.   • Immediately wash your hands with soap and water for at least 20 seconds or, if soap and water are not available, clean your hands with an alcohol-based hand  that contains at least 60% alcohol.    Clean your hands often  • Wash your hands often with soap and water for at least 20 seconds, especially after blowing your nose, coughing, or sneezing; going to the bathroom; and before eating or preparing food.     • If soap and water are not readily available, use an alcohol-based hand  with at least 60%  alcohol, covering all surfaces of your hands and rubbing them together until they feel dry.    • Soap and water are the best option if hands are visibly dirty. Avoid touching your eyes, nose, and mouth with unwashed hands.    Avoid sharing personal household items  • You should not share dishes, drinking glasses, cups, eating utensils, towels, or bedding with other people or pets in your home.   • After using these items, they should be washed thoroughly with soap and water.    Clean all “high-touch” surfaces everyday  • High touch surfaces include counters, tabletops, doorknobs, bathroom fixtures, toilets, phones, keyboards, tablets, and bedside tables.   • Also, clean any surfaces that may have blood, stool, or body fluids on them.   • Use a household cleaning spray or wipe, according to the label instructions. Labels contain instructions for safe and effective use of the cleaning product, including precautions you should take when applying the product, such as wearing gloves and making sure you have good ventilation during use of the product.    Monitor your symptoms  • Seek prompt medical attention if your illness is worsening (e.g., difficulty breathing).   • Before seeking care, call your healthcare provider and tell them that you have, or are being evaluated for, COVID-19.   • Put on a facemask before you enter the facility.     • These steps will help the healthcare provider’s office to keep other people in the office or waiting room from getting infected or exposed.   • Persons who are placed under active monitoring or facilitated self-monitoring should follow instructions provided by their local health department or occupational health professionals, as appropriate.  • If you have a medical emergency and need to call 911, notify the dispatch personnel that you have, or are being evaluated for COVID-19. If possible, put on a facemask before emergency medical services arrive.    Discontinuing home  isolation  Patients with confirmed COVID-19 should remain under home isolation precautions until the risk of secondary transmission to others is thought to be low. The decision to discontinue home isolation precautions should be made on a case-by-case basis, in consultation with healthcare providers and state and local health departments.    The below content are for household members, intimate partners, and caregivers of a patient with symptomatic laboratory-confirmed COVID-19 or a patient under investigation:    Household members, intimate partners, and caregivers may have close contact with a person with symptomatic, laboratory-confirmed COVID-19 or a person under investigation.     Close contacts should monitor their health; they should call their healthcare provider right away if they develop symptoms suggestive of COVID-19 (e.g., fever, cough, shortness of breath)     Close contacts should also follow these recommendations:  • Make sure that you understand and can help the patient follow their healthcare provider’s instructions for medication(s) and care. You should help the patient with basic needs in the home and provide support for getting groceries, prescriptions, and other personal needs.  • Monitor the patient’s symptoms. If the patient is getting sicker, call his or her healthcare provider and tell them that the patient has laboratory-confirmed COVID-19. This will help the healthcare provider’s office take steps to keep other people in the office or waiting room from getting infected. Ask the healthcare provider to call the local or state health department for additional guidance. If the patient has a medical emergency and you need to call 911, notify the dispatch personnel that the patient has, or is being evaluated for COVID-19.  • Household members should stay in another room or be  from the patient as much as possible. Household members should use a separate bedroom and bathroom, if  available.  • Prohibit visitors who do not have an essential need to be in the home.  • Household members should care for any pets in the home. Do not handle pets or other animals while sick.  For more information, see COVID-19 and Animals.  • Make sure that shared spaces in the home have good air flow, such as by an air conditioner or an opened window, weather permitting.  • Perform hand hygiene frequently. Wash your hands often with soap and water for at least 20 seconds or use an alcohol-based hand  that contains 60 to 95% alcohol, covering all surfaces of your hands and rubbing them together until they feel dry. Soap and water should be used preferentially if hands are visibly dirty.  • Avoid touching your eyes, nose, and mouth with unwashed hands.  • The patient should wear a facemask when you are around other people. If the patient is not able to wear a facemask (for example, because it causes trouble breathing), you, as the caregiver, should wear a mask when you are in the same room as the patient.  • Wear a disposable facemask and gloves when you touch or have contact with the patient’s blood, stool, or body fluids, such as saliva, sputum, nasal mucus, vomit, or urine.   o Throw out disposable facemasks and gloves after using them. Do not reuse.  o When removing personal protective equipment, first remove and dispose of gloves. Then, immediately clean your hands with soap and water or alcohol-based hand . Next, remove and dispose of facemask, and immediately clean your hands again with soap and water or alcohol-based hand .  • Avoid sharing household items with the patient. You should not share dishes, drinking glasses, cups, eating utensils, towels, bedding, or other items. After the patient uses these items, you should wash them thoroughly (see below “Wash laundry thoroughly”).  • Clean all “high-touch” surfaces, such as counters, tabletops, doorknobs, bathroom fixtures, toilets,  phones, keyboards, tablets, and bedside tables, every day. Also, clean any surfaces that may have blood, stool, or body fluids on them.   o Use a household cleaning spray or wipe, according to the label instructions. Labels contain instructions for safe and effective use of the cleaning product including precautions you should take when applying the product, such as wearing gloves and making sure you have good ventilation during use of the product.  • Wash laundry thoroughly.   o Immediately remove and wash clothes or bedding that have blood, stool, or body fluids on them.  o Wear disposable gloves while handling soiled items and keep soiled items away from your body. Clean your hands (with soap and water or an alcohol-based hand ) immediately after removing your gloves.  o Read and follow directions on labels of laundry or clothing items and detergent. In general, using a normal laundry detergent according to washing machine instructions and dry thoroughly using the warmest temperatures recommended on the clothing label.  • Place all used disposable gloves, facemasks, and other contaminated items in a lined container before disposing of them with other household waste. Clean your hands (with soap and water or an alcohol-based hand ) immediately after handling these items. Soap and water should be used preferentially if hands are visibly dirty.  • Discuss any additional questions with your state or local health department or healthcare provider.    Adapted from information provided by the Centers for Disease Control and Prevention.  For more information, visit https://www.cdc.gov/coronavirus/2019-ncov/hcp/guidance-prevent-spread.html      Any medications prescribed have been sent electronically to     [facility]      BLAKE Mckenzie  03/25/20  10:29 AM

## 2020-03-23 NOTE — PATIENT INSTRUCTIONS
Assessment and plan  49-year-old female who is being evaluated today for Evisit.  She has had cough, shortness of breath, nasal congestion, pain around her nose and face, headache, ear pain, tenderness in the front and back of her neck, sore throat, sneezing, and watery eyes for a few days.  She has no documented fever but is feeling warm.  She is a smoker but has no chronic underlying harder pulmonary conditions or diabetes.  She has not traveled in the last 14 days or had contact with anyone known to COVID 19 or being evaluated for COVID 19.  She has tried no spray and allergy medication.  She should continue Flonase or Nasacort 2 sprays each nostril once daily, and Claritin 10 mg, Zyrtec 10 mg, Xyzal 5 mg, or Allegra 180 mg once daily, as well as Mucinex DM twice daily.  I will send in an albuterol inhaler to use as needed for cough, chest tightness, or shortness of breath.  I advise that she self quarantine for 14 days and contact the [facility] for any further recommendations at [phone].  She should contact our office or go to the urgent care or ER ASAP if worsening, new or changing symptoms.  If she has any worsening shortness of breath or respiratory symptoms, she should be seen at immediately. About 15 minutes spent reviewing chart and my chart encounter/E visit, medical decision-making, and treatment plan.    How to Quarantine at Home  Information for Patients and Families    These instructions are for people with confirmed or suspected COVID-19 who do not need to be hospitalized and those with confirmed COVID-19 who were hospitalized and discharged to care for themselves at home.    If you were tested through the Health Department  The Health Department will monitor your wellbeing.  If it is determined that you do not need to be hospitalized and can be isolated at home, you will be monitored by staff from your local or state health department.     If you were tested through a  Commercial Lab  You will need to monitor yourself and report changes in your symptoms to your doctor.  See the section below called Monitor Your Symptoms.    Follow these steps until a healthcare provider or local or state health department says you can return to your normal activities.    Stay home except to get medical care  • Restrict activities outside your home, except for getting medical care.   • Do not go to work, school, or public areas.   • Avoid using public transportation, ride-sharing, or taxis.    Separate yourself from other people and animals in your home  People  As much as possible, you should stay in a specific room and away from other people in your home. Also, you should use a separate bathroom, if available.    Animals  You should restrict contact with pets and other animals while you are sick with COVID-19, just like you would around other people. When possible, have another member of your household care for your animals while you are sick. If you are sick with COVID-19, avoid contact with your pet, including petting, snuggling, being kissed or licked, and sharing food. If you must care for your pet or be around animals while you are sick, wash your hands before and after you interact with pets and wear a facemask. See COVID-19 and Animals for more information.    Call ahead before visiting your doctor  If you have a medical appointment, call the healthcare provider and tell them that you have or may have COVID-19. This information will help the healthcare provider’s office take steps to keep other people from getting infected or exposed.    Wear a facemask  You should wear a facemask when you are around other people (e.g., sharing a room or vehicle) or pets and before you enter a healthcare provider’s office.     If you are not able to wear a facemask (for example, because it causes trouble breathing), then people who live with you should not stay in the same room with you, or they should  wear a facemask if they enter your room.    Cover your coughs and sneezes  • Cover your mouth and nose with a tissue when you cough or sneeze.   • Throw used tissues in a lined trash can.   • Immediately wash your hands with soap and water for at least 20 seconds or, if soap and water are not available, clean your hands with an alcohol-based hand  that contains at least 60% alcohol.    Clean your hands often  • Wash your hands often with soap and water for at least 20 seconds, especially after blowing your nose, coughing, or sneezing; going to the bathroom; and before eating or preparing food.     • If soap and water are not readily available, use an alcohol-based hand  with at least 60% alcohol, covering all surfaces of your hands and rubbing them together until they feel dry.    • Soap and water are the best option if hands are visibly dirty. Avoid touching your eyes, nose, and mouth with unwashed hands.    Avoid sharing personal household items  • You should not share dishes, drinking glasses, cups, eating utensils, towels, or bedding with other people or pets in your home.   • After using these items, they should be washed thoroughly with soap and water.    Clean all “high-touch” surfaces everyday  • High touch surfaces include counters, tabletops, doorknobs, bathroom fixtures, toilets, phones, keyboards, tablets, and bedside tables.   • Also, clean any surfaces that may have blood, stool, or body fluids on them.   • Use a household cleaning spray or wipe, according to the label instructions. Labels contain instructions for safe and effective use of the cleaning product, including precautions you should take when applying the product, such as wearing gloves and making sure you have good ventilation during use of the product.    Monitor your symptoms  • Seek prompt medical attention if your illness is worsening (e.g., difficulty breathing).   • Before seeking care, call your healthcare provider  and tell them that you have, or are being evaluated for, COVID-19.   • Put on a facemask before you enter the facility.     • These steps will help the healthcare provider’s office to keep other people in the office or waiting room from getting infected or exposed.   • Persons who are placed under active monitoring or facilitated self-monitoring should follow instructions provided by their local health department or occupational health professionals, as appropriate.  • If you have a medical emergency and need to call 911, notify the dispatch personnel that you have, or are being evaluated for COVID-19. If possible, put on a facemask before emergency medical services arrive.    Discontinuing home isolation  Patients with confirmed COVID-19 should remain under home isolation precautions until the risk of secondary transmission to others is thought to be low. The decision to discontinue home isolation precautions should be made on a case-by-case basis, in consultation with healthcare providers and state and local health departments.    The below content are for household members, intimate partners, and caregivers of a patient with symptomatic laboratory-confirmed COVID-19 or a patient under investigation:    Household members, intimate partners, and caregivers may have close contact with a person with symptomatic, laboratory-confirmed COVID-19 or a person under investigation.     Close contacts should monitor their health; they should call their healthcare provider right away if they develop symptoms suggestive of COVID-19 (e.g., fever, cough, shortness of breath)     Close contacts should also follow these recommendations:  • Make sure that you understand and can help the patient follow their healthcare provider’s instructions for medication(s) and care. You should help the patient with basic needs in the home and provide support for getting groceries, prescriptions, and other personal needs.  • Monitor the patient’s  symptoms. If the patient is getting sicker, call his or her healthcare provider and tell them that the patient has laboratory-confirmed COVID-19. This will help the healthcare provider’s office take steps to keep other people in the office or waiting room from getting infected. Ask the healthcare provider to call the local or state health department for additional guidance. If the patient has a medical emergency and you need to call 911, notify the dispatch personnel that the patient has, or is being evaluated for COVID-19.  • Household members should stay in another room or be  from the patient as much as possible. Household members should use a separate bedroom and bathroom, if available.  • Prohibit visitors who do not have an essential need to be in the home.  • Household members should care for any pets in the home. Do not handle pets or other animals while sick.  For more information, see COVID-19 and Animals.  • Make sure that shared spaces in the home have good air flow, such as by an air conditioner or an opened window, weather permitting.  • Perform hand hygiene frequently. Wash your hands often with soap and water for at least 20 seconds or use an alcohol-based hand  that contains 60 to 95% alcohol, covering all surfaces of your hands and rubbing them together until they feel dry. Soap and water should be used preferentially if hands are visibly dirty.  • Avoid touching your eyes, nose, and mouth with unwashed hands.  • The patient should wear a facemask when you are around other people. If the patient is not able to wear a facemask (for example, because it causes trouble breathing), you, as the caregiver, should wear a mask when you are in the same room as the patient.  • Wear a disposable facemask and gloves when you touch or have contact with the patient’s blood, stool, or body fluids, such as saliva, sputum, nasal mucus, vomit, or urine.   o Throw out disposable facemasks and gloves  after using them. Do not reuse.  o When removing personal protective equipment, first remove and dispose of gloves. Then, immediately clean your hands with soap and water or alcohol-based hand . Next, remove and dispose of facemask, and immediately clean your hands again with soap and water or alcohol-based hand .  • Avoid sharing household items with the patient. You should not share dishes, drinking glasses, cups, eating utensils, towels, bedding, or other items. After the patient uses these items, you should wash them thoroughly (see below “Wash laundry thoroughly”).  • Clean all “high-touch” surfaces, such as counters, tabletops, doorknobs, bathroom fixtures, toilets, phones, keyboards, tablets, and bedside tables, every day. Also, clean any surfaces that may have blood, stool, or body fluids on them.   o Use a household cleaning spray or wipe, according to the label instructions. Labels contain instructions for safe and effective use of the cleaning product including precautions you should take when applying the product, such as wearing gloves and making sure you have good ventilation during use of the product.  • Wash laundry thoroughly.   o Immediately remove and wash clothes or bedding that have blood, stool, or body fluids on them.  o Wear disposable gloves while handling soiled items and keep soiled items away from your body. Clean your hands (with soap and water or an alcohol-based hand ) immediately after removing your gloves.  o Read and follow directions on labels of laundry or clothing items and detergent. In general, using a normal laundry detergent according to washing machine instructions and dry thoroughly using the warmest temperatures recommended on the clothing label.  • Place all used disposable gloves, facemasks, and other contaminated items in a lined container before disposing of them with other household waste. Clean your hands (with soap and water or an  alcohol-based hand ) immediately after handling these items. Soap and water should be used preferentially if hands are visibly dirty.  • Discuss any additional questions with your state or local health department or healthcare provider.    Adapted from information provided by the Centers for Disease Control and Prevention.  For more information, visit https://www.cdc.gov/coronavirus/2019-ncov/hcp/guidance-prevent-spread.html

## 2020-06-30 ENCOUNTER — PATIENT MESSAGE (OUTPATIENT)
Dept: FAMILY MEDICINE CLINIC | Facility: CLINIC | Age: 50
End: 2020-06-30

## 2020-06-30 DIAGNOSIS — Z20.7 SCABIES EXPOSURE: ICD-10-CM

## 2020-06-30 DIAGNOSIS — R21 RASH: Primary | ICD-10-CM

## 2020-06-30 RX ORDER — PERMETHRIN 50 MG/G
CREAM TOPICAL
Qty: 60 G | Refills: 1 | Status: SHIPPED | OUTPATIENT
Start: 2020-06-30 | End: 2020-07-09

## 2020-06-30 NOTE — TELEPHONE ENCOUNTER
From: Mary Banda  To: Katie Felipe PA  Sent: 6/30/2020 9:31 AM EDT  Subject: Prescription Question    Good morning. 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 He was diagnosed with [place] Scabies. I, as well as about 12 other staff members, have red itchy bumps popping up on my skin. My  now has them too. Is there any way you would call in a prescription for permethrin cream? The bumps are mostly on my LFA and lower and middle back but a few are scattered on the rest of my body. I didn't think you would want me to come into the office for this issue but i can if needed. Thank you very much for your time.  Mary Banda

## 2020-08-23 DIAGNOSIS — G25.81 RESTLESS LEG SYNDROME, UNCONTROLLED: ICD-10-CM

## 2020-08-25 RX ORDER — ROPINIROLE 1 MG/1
TABLET, FILM COATED ORAL
Qty: 90 TABLET | Refills: 0 | Status: SHIPPED | OUTPATIENT
Start: 2020-08-25 | End: 2020-11-23

## 2020-11-21 DIAGNOSIS — G25.81 RESTLESS LEG SYNDROME, UNCONTROLLED: ICD-10-CM

## 2020-11-23 RX ORDER — ROPINIROLE 1 MG/1
TABLET, FILM COATED ORAL
Qty: 30 TABLET | Refills: 0 | Status: SHIPPED | OUTPATIENT
Start: 2020-11-23

## 2020-11-23 NOTE — TELEPHONE ENCOUNTER
Patient has not been seen by me since January 2020 and this was for an acute visit.  She does need to be seen to establish care and routine follow-up.

## 2021-01-27 PROBLEM — R93.89 ABNORMAL CT OF THE CHEST: Status: RESOLVED | Noted: 2019-03-24 | Resolved: 2021-01-27

## 2021-01-27 PROBLEM — J10.1 INFLUENZA A: Status: RESOLVED | Noted: 2018-03-01 | Resolved: 2021-01-27

## 2021-01-27 PROBLEM — Z00.00 PHYSICAL EXAM, ANNUAL: Status: RESOLVED | Noted: 2018-12-11 | Resolved: 2021-01-27

## 2021-01-27 PROBLEM — R45.86: Status: RESOLVED | Noted: 2018-01-04 | Resolved: 2021-01-27

## 2021-02-01 ENCOUNTER — OFFICE VISIT (OUTPATIENT)
Dept: FAMILY MEDICINE CLINIC | Facility: CLINIC | Age: 51
End: 2021-02-01

## 2021-02-01 VITALS
TEMPERATURE: 98.2 F | RESPIRATION RATE: 16 BRPM | HEART RATE: 79 BPM | HEIGHT: 63 IN | WEIGHT: 204 LBS | DIASTOLIC BLOOD PRESSURE: 70 MMHG | BODY MASS INDEX: 36.14 KG/M2 | OXYGEN SATURATION: 100 % | SYSTOLIC BLOOD PRESSURE: 124 MMHG

## 2021-02-01 DIAGNOSIS — Z00.00 ROUTINE ADULT HEALTH MAINTENANCE: ICD-10-CM

## 2021-02-01 DIAGNOSIS — G89.29 CHRONIC LEFT-SIDED LOW BACK PAIN WITHOUT SCIATICA: ICD-10-CM

## 2021-02-01 DIAGNOSIS — G25.81 RESTLESS LEG SYNDROME, UNCONTROLLED: ICD-10-CM

## 2021-02-01 DIAGNOSIS — D72.829 LEUKOCYTOSIS, UNSPECIFIED TYPE: ICD-10-CM

## 2021-02-01 DIAGNOSIS — E55.9 VITAMIN D DEFICIENCY: ICD-10-CM

## 2021-02-01 DIAGNOSIS — M54.50 CHRONIC LEFT-SIDED LOW BACK PAIN WITHOUT SCIATICA: ICD-10-CM

## 2021-02-01 DIAGNOSIS — M79.671 BILATERAL FOOT PAIN: ICD-10-CM

## 2021-02-01 DIAGNOSIS — M50.90 DISC DISORDER OF CERVICAL REGION: ICD-10-CM

## 2021-02-01 DIAGNOSIS — R31.9 HEMATURIA, UNSPECIFIED TYPE: ICD-10-CM

## 2021-02-01 DIAGNOSIS — F41.8 MIXED ANXIETY DEPRESSIVE DISORDER: ICD-10-CM

## 2021-02-01 DIAGNOSIS — M79.672 BILATERAL FOOT PAIN: ICD-10-CM

## 2021-02-01 DIAGNOSIS — R73.03 PREDIABETES: ICD-10-CM

## 2021-02-01 DIAGNOSIS — E78.2 MIXED HYPERLIPIDEMIA: Primary | ICD-10-CM

## 2021-02-01 DIAGNOSIS — R60.0 PEDAL EDEMA: ICD-10-CM

## 2021-02-01 LAB
BILIRUB BLD-MCNC: NEGATIVE MG/DL
CLARITY, POC: CLEAR
COLOR UR: YELLOW
GLUCOSE UR STRIP-MCNC: NEGATIVE MG/DL
KETONES UR QL: NEGATIVE
LEUKOCYTE EST, POC: ABNORMAL
NITRITE UR-MCNC: NEGATIVE MG/ML
PH UR: 5.5 [PH] (ref 5–8)
POC CREATININE URINE: 300
POC MICROALBUMIN URINE: 30
PROT UR STRIP-MCNC: NEGATIVE MG/DL
RBC # UR STRIP: ABNORMAL /UL
SP GR UR: 1.02 (ref 1–1.03)
UROBILINOGEN UR QL: NORMAL

## 2021-02-01 PROCEDURE — 82044 UR ALBUMIN SEMIQUANTITATIVE: CPT | Performed by: PHYSICIAN ASSISTANT

## 2021-02-01 PROCEDURE — 99214 OFFICE O/P EST MOD 30 MIN: CPT | Performed by: PHYSICIAN ASSISTANT

## 2021-02-01 PROCEDURE — 81003 URINALYSIS AUTO W/O SCOPE: CPT | Performed by: PHYSICIAN ASSISTANT

## 2021-02-01 RX ORDER — FLUOXETINE 20 MG/1
20 TABLET, FILM COATED ORAL DAILY
Qty: 90 TABLET | Refills: 0 | Status: SHIPPED | OUTPATIENT
Start: 2021-02-01 | End: 2021-06-09

## 2021-02-01 NOTE — PATIENT INSTRUCTIONS
Roll feet on frozen water bottle 3-4 x daily x 10-15 minutes.     Plantar Fasciitis Rehab  Ask your health care provider which exercises are safe for you. Do exercises exactly as told by your health care provider and adjust them as directed. It is normal to feel mild stretching, pulling, tightness, or discomfort as you do these exercises. Stop right away if you feel sudden pain or your pain gets worse. Do not begin these exercises until told by your health care provider.  Stretching and range-of-motion exercises  These exercises warm up your muscles and joints and improve the movement and flexibility of your foot. These exercises also help to relieve pain.  Plantar fascia stretch    1. Sit with your left / right leg crossed over your opposite knee.  2. Hold your heel with one hand with that thumb near your arch. With your other hand, hold your toes and gently pull them back toward the top of your foot. You should feel a stretch on the bottom of your toes or your foot (plantar fascia) or both.  3. Hold this stretch for__________ seconds.  4. Slowly release your toes and return to the starting position.  Repeat __________ times. Complete this exercise __________ times a day.  Gastrocnemius stretch, standing  This exercise is also called a calf (gastroc) stretch. It stretches the muscles in the back of the upper calf.  1. Stand with your hands against a wall.  2. Extend your left / right leg behind you, and bend your front knee slightly.  3. Keeping your heels on the floor and your back knee straight, shift your weight toward the wall. Do not arch your back. You should feel a gentle stretch in your upper left / right calf.  4. Hold this position for __________ seconds.  Repeat __________ times. Complete this exercise __________ times a day.  Soleus stretch, standing  This exercise is also called a calf (soleus) stretch. It stretches the muscles in the back of the lower calf.  1. Stand with your hands against a  wall.  2. Extend your left / right leg behind you, and bend your front knee slightly.  3. Keeping your heels on the floor, bend your back knee and shift your weight slightly over your back leg. You should feel a gentle stretch deep in your lower calf.  4. Hold this position for __________ seconds.  Repeat __________ times. Complete this exercise __________ times a day.  Gastroc and soleus stretch, standing step  This exercise stretches the muscles in the back of the lower leg. These muscles are in the upper calf (gastrocnemius) and the lower calf (soleus).  1. Stand with the ball of your left / right foot on a step. The ball of your foot is on the walking surface, right under your toes.  2. Keep your other foot firmly on the same step.  3. Hold on to the wall or a railing for balance.  4. Slowly lift your other foot, allowing your body weight to press your left / right heel down over the edge of the step. You should feel a stretch in your left / right calf.  5. Hold this position for __________ seconds.  6. Return both feet to the step.  7. Repeat this exercise with a slight bend in your left / right knee.  Repeat __________ times with your left / right knee straight and __________ times with your left / right knee bent. Complete this exercise __________ times a day.  Balance exercise  This exercise builds your balance and strength control of your arch to help take pressure off your plantar fascia.  Single leg stand  If this exercise is too easy, you can try it with your eyes closed or while standing on a pillow.  1. Without shoes, stand near a railing or in a doorway. You may hold on to the railing or door frame as needed.  2. Stand on your left / right foot. Keep your big toe down on the floor and try to keep your arch lifted. Do not let your foot roll inward.  3. Hold this position for __________ seconds.  Repeat __________ times. Complete this exercise __________ times a day.  This information is not intended to  replace advice given to you by your health care provider. Make sure you discuss any questions you have with your health care provider.  Document Revised: 04/09/2020 Document Reviewed: 10/16/2019  [employer] Patient Education © 2020 [employer]

## 2021-02-01 NOTE — PROGRESS NOTES
Subjective   Mary Banda is a 50 y.o. female who is being evaluated by telephone visit for foot pain and in follow up of hyperlipidemia, prediabetes, vitamin D deficiency, leukocytosis, RLS, moods, edema, and allergic rhinitis.     History of Present Illness   You have chosen to receive care through a telephone visit. Do you consent to use a telephone visit for your medical care today? Yes    Foot pain x > 1 year- bilateral feet- when on her feet all day, lateral foot pain. Prior, if she sat down and got back up, felt like her feet didn't want to bend. Also worse with waking up in the working. Pain while is on her feet all day as well. Has not tried tried inserts- tried padded toe separators for possible bunion. Thought helped when she wears them but does not tolerate well.     Has been very tired during the day. If she sits down, she falls asleep. Snoring- loud but no reported apnea.     Mixed hyperlipidemia- patient is on no medication. Has not been diligent with diet or exercise.   Prediabetes- A1C has remained about 5.7% but has not been checked in a couple years. No medications.   Vitamin D deficiency- not taking vitamin D regularly  Leukocytosis- no signs of infection or malignancy, recent unexpected weight loss. She continues to smoke- Rx Chantix 12/2020.     RLS- Has not had sleep study. Dx by . Continues Requip 1 mg once daily- working well- not taking every night. The 1 mg tablet makes her too sleepy, so she does not take it. Only takes if her legs are bothersome.    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 Now has mother in law living with her and work changes with Covid. Having increased anxiety and has noticed her BP is up. In the past, was on Prozac 20 mg daily- worked very well then wasn't enough and increased it and was too much- having sexual AE. No mood AE with increased dosing. No SI/HI, concerns for safety.     Lexapro- didn't help and may have made symptoms worse  Wellbutrin- didn't help- did nothing.     Edema- Patient has Rx from previous PCP for HCTZ 12.5 mg daily. Taking 1-2 x weekly. Does not have some puffiness in hands. Notices the day after a salty. No SOA associated with swelling.     Allergic rhinitis- She continues Flonase as needed.     Low back pain with sciatica, cervical DDD- Flexeril- takes as needed- has not taken in months.     The following portions of the patient's history were reviewed and updated as appropriate: allergies, current medications, past family history, past medical history, past social history, past surgical history and problem list.    Review of Systems    Objective   Vitals:    02/01/21 0855   BP: 124/70   Pulse: 79   Resp: 16   Temp: 98.2 °F (36.8 °C)   SpO2: 100%     Body mass index is 36.14 kg/m².    Physical Exam  Vitals signs and nursing note reviewed.   Constitutional:       General: She is not in acute distress.     Appearance: Normal appearance. She is well-developed.   HENT:      Head: Normocephalic and atraumatic.      Right Ear: External ear normal.      Left Ear: External ear normal.      Nose: Nose normal.   Eyes:      General: Lids are normal.      Conjunctiva/sclera: Conjunctivae normal.   Neck:      Musculoskeletal: Neck supple.      Vascular: No carotid bruit.   Cardiovascular:      Rate and Rhythm: Normal rate and regular rhythm.      Pulses: Normal pulses.      Heart sounds: Normal heart sounds. No murmur. No friction rub. No gallop.    Pulmonary:      Effort: Pulmonary effort is normal. No respiratory distress.      Breath sounds: Normal breath sounds. No wheezing, rhonchi or rales.   Musculoskeletal:         General: No deformity.        Feet:    Skin:     General: Skin is warm and dry.   Neurological:      Mental Status: She is alert and oriented to person, place, and time.      Gait: Gait normal.   Psychiatric:         Mood and Affect: Mood normal.          Speech: Speech normal.         Behavior: Behavior normal.         Thought Content: Thought content normal.         Judgment: Judgment normal.         Assessment/Plan   Diagnoses and all orders for this visit:    1. Mixed hyperlipidemia (Primary)  -     Comprehensive Metabolic Panel  -     Hemoglobin A1c  -     CK  -     Lipid Panel With LDL / HDL Ratio  -     Vitamin D 25 Hydroxy  -     TSH  -     T4, free  -     T3, Free    2. Prediabetes  -     Comprehensive Metabolic Panel  -     Hemoglobin A1c  -     Lipid Panel With LDL / HDL Ratio  -     Vitamin D 25 Hydroxy  -     TSH  -     T4, free  -     T3, Free  -     POC Urinalysis Dipstick, Automated  -     POC Microalbumin    3. Vitamin D deficiency  -     Comprehensive Metabolic Panel  -     Vitamin D 25 Hydroxy  -     TSH  -     T4, free  -     T3, Free    4. Leukocytosis, unspecified type  -     CBC & Differential    5. Restless leg syndrome, uncontrolled  -     CBC & Differential  -     Hemoglobin A1c  -     Iron and TIBC  -     Ferritin  -     Vitamin B12 & Folate  -     TSH  -     T4, free  -     T3, Free  -     Uric Acid  -     Vitamin B1, Whole Blood  -     Vitamin B6  -     Vitamin B7 (Biotin)    6. Mixed anxiety depressive disorder  -     FLUoxetine (PROzac) 20 MG tablet; Take 1 tablet by mouth Daily.  Dispense: 90 tablet; Refill: 0    7. Bilateral foot pain  -     CBC & Differential  -     Hemoglobin A1c  -     Iron and TIBC  -     Ferritin  -     Vitamin B12 & Folate  -     TSH  -     T4, free  -     T3, Free  -     Uric Acid  -     Vitamin B1, Whole Blood  -     Vitamin B6  -     Vitamin B7 (Biotin)  -     Ambulatory Referral to Podiatry    8. Pedal edema    9. Chronic left-sided low back pain without sciatica    10. Disc disorder of cervical region    11. Routine adult health maintenance  -     CBC & Differential  -     Comprehensive Metabolic Panel  -     Hemoglobin A1c  -     CK  -     Lipid Panel With LDL / HDL Ratio  -     Iron and TIBC  -      Ferritin  -     Vitamin B12 & Folate  -     Vitamin D 25 Hydroxy  -     TSH  -     T4, free  -     T3, Free  -     Uric Acid  -     Vitamin B1, Whole Blood  -     Vitamin B6  -     Vitamin B7 (Biotin)  -     POC Urinalysis Dipstick, Automated  -     POC Microalbumin    12. Hematuria, unspecified type  -     Urinalysis With Microscopic - Urine, Clean Catch  -     Urine Culture - Urine, Urine, Clean Catch        Assessment and plan  Patient has been seen for acute complaints/ concerns but is establishing care with me today for more reoutine conditions and follow up. She will have fasting labs. Call if no results in 1 week. Stability of conditions, plan, follow up, and further recommendations pending labs.  Will need reevaluation of moods in 6 to 12 weeks and follow-up pending labs.    · Foot pain-I will give stretches and exercises for plantar fasciitis and advised that she roll her foot on a frozen water bottle 3-4 times a day x10 to 15 minutes.  I will also refer to podiatry, as her pain has been present for a year.  I did asked that she go to [employer] or [employer] to see about more custom shoe fit.  · Mixed hyperlipidemia- Await labs for further recommendations.   · Prediabetes- A1C has remained about 5.7%. I will continue to monitor and make treatment recommendations.    · Vitamin D deficiency- Dosing recommendations pending labs.   · Leukocytosis- I will repeat labs. This could be from smoking history, however, I would prefer further workup and consideration of hematology consultation to ensure no further workup or treatment is needed.   · RLS- Symptoms are stable. Continue Requip 1 mg once daily as needed. Consideration of sleep study for formal diagnosis if uncontrolled symptoms.   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  She previously did well on Prozac.  I will have her start Prozac  20 mg half tablet daily for 1 to 2 weeks.  If no AE with medication but continued uncontrolled moods, increased to 20 mg once daily.  Patient to be seen ASAP if worsening moods or AE with medication.  Otherwise, she will need to have reevaluation of moods and 6 to 12 weeks.  · Edema- I discussed with patient that PRN use of diuretics is not common and that edema is either a sign of underlying CHF or other chronic conditions or is related to lifestyle, diet, and exercise. I recommend Compression stockings, decrease sugars, starches, and salts, increase water, and ensure proper exercise daily. If persistent edema, we should consider cardiology referral and further workup.  · Allergic rhinitis- Continue Flonase 2 sprays in each nostril once daily and claritin or zyrtec 10 mg once daily as needed.   · Low back pain with sciatica- Flexeril is working well and only used occasionally.  She has not needed medication in months.  Follow-up if worsening pain, new or changing symptoms.  · Fatigue-I will check labs and make further recommendations.  With snoring and fatigue, we will consider sleep study pending results and persistent fatigue and/or edema.      I spent 40 minutes caring for Mary Banda on this date of service. This time includes time spent by me in the following activities: preparing for the visit, reviewing tests, specialists records, and previous visits, obtaining and/or reviewing a separately obtained history, performing a medically appropriate examination and/or evaluation, counseling and educating the patient/family/caregiver, referring and/or communicating with other health care professionals as necessary, documenting information in the medical record, independently interpreting results and communicating that information with the patient/family/caregiver, and developing a medically appropriate treatment plan with consideration of other conditions, medications, and treatments.

## 2021-02-03 LAB
APPEARANCE UR: ABNORMAL
BACTERIA #/AREA URNS HPF: ABNORMAL /HPF
BACTERIA UR CULT: ABNORMAL
BACTERIA UR CULT: ABNORMAL
BILIRUB UR QL STRIP: NEGATIVE
CASTS URNS MICRO: ABNORMAL
COLOR UR: YELLOW
EPI CELLS #/AREA URNS HPF: ABNORMAL /HPF
GLUCOSE UR QL: NEGATIVE
HGB UR QL STRIP: ABNORMAL
KETONES UR QL STRIP: NEGATIVE
LEUKOCYTE ESTERASE UR QL STRIP: ABNORMAL
NITRITE UR QL STRIP: NEGATIVE
OTHER ANTIBIOTIC SUSC ISLT: ABNORMAL
PH UR STRIP: 5.5 [PH] (ref 5–8)
PROT UR QL STRIP: NEGATIVE
RBC #/AREA URNS HPF: ABNORMAL /HPF
SP GR UR: 1.02 (ref 1–1.03)
UROBILINOGEN UR STRIP-MCNC: ABNORMAL MG/DL
WBC #/AREA URNS HPF: ABNORMAL /HPF

## 2021-02-04 RX ORDER — NITROFURANTOIN 25; 75 MG/1; MG/1
100 CAPSULE ORAL 2 TIMES DAILY
Qty: 14 CAPSULE | Refills: 0 | Status: SHIPPED | OUTPATIENT
Start: 2021-02-04 | End: 2021-03-01

## 2021-02-12 ENCOUNTER — TELEPHONE (OUTPATIENT)
Dept: FAMILY MEDICINE CLINIC | Facility: CLINIC | Age: 51
End: 2021-02-12

## 2021-02-12 NOTE — TELEPHONE ENCOUNTER
----- Message from Tee Castle MA sent at 2/12/2021 12:43 PM EST -----  Regarding: FW: Test Results Question  Contact: [phone]    ----- Message -----  From: Mary Banda  Sent: 2/12/2021  11:55 AM EST  To: [facility]  Subject: RE: Test Results Question                          Hesham mir, they wrre done the day after the UA.   ----- Message -----  From: SAMANTHA SAMANIEGO  Sent: 2/12/21 11:53 AM  To: Mary Banda  Subject: RE: Test Results Question    Naseem Hernandez!    It looks like on my end that those labs weren't drawn that day. Please give us a call to schedule a lab appt to get those drawn. When you call, if the hub answers, just let them know that you need to schedule a fasting lab appt. And if you happen to get through to the office and speak to Tee, just let her know that is what you need. Our number is      Dee Alanis      ----- Message -----       From:Mary Banda       Sent:2/12/2021 11:46 AM EST         To:BLAKE Mckenzie    Subject:Test Results Question    I was just curious about the results of the labs that were drawn.  They were done at the same time as the UA.   Thank you   Mary Banda

## 2021-02-14 LAB
25(OH)D3+25(OH)D2 SERPL-MCNC: 21.3 NG/ML (ref 30–100)
ALBUMIN SERPL-MCNC: 4.1 G/DL (ref 3.5–5.2)
ALBUMIN/GLOB SERPL: 1.5 G/DL
ALP SERPL-CCNC: 78 U/L (ref 39–117)
ALT SERPL-CCNC: 25 U/L (ref 1–33)
AST SERPL-CCNC: 14 U/L (ref 1–32)
BASOPHILS # BLD AUTO: 0.09 10*3/MM3 (ref 0–0.2)
BASOPHILS NFR BLD AUTO: 0.9 % (ref 0–1.5)
BILIRUB SERPL-MCNC: 0.3 MG/DL (ref 0–1.2)
BIOTIN SERPL-MCNC: <0.05 NG/ML (ref 0.05–0.83)
BUN SERPL-MCNC: 17 MG/DL (ref 6–20)
BUN/CREAT SERPL: 19.5 (ref 7–25)
CALCIUM SERPL-MCNC: 9.4 MG/DL (ref 8.6–10.5)
CHLORIDE SERPL-SCNC: 105 MMOL/L (ref 98–107)
CHOLEST SERPL-MCNC: 198 MG/DL (ref 0–200)
CK SERPL-CCNC: 71 U/L (ref 20–180)
CO2 SERPL-SCNC: 26.2 MMOL/L (ref 22–29)
CREAT SERPL-MCNC: 0.87 MG/DL (ref 0.57–1)
EOSINOPHIL # BLD AUTO: 0.24 10*3/MM3 (ref 0–0.4)
EOSINOPHIL NFR BLD AUTO: 2.3 % (ref 0.3–6.2)
ERYTHROCYTE [DISTWIDTH] IN BLOOD BY AUTOMATED COUNT: 13 % (ref 12.3–15.4)
FERRITIN SERPL-MCNC: 86.5 NG/ML (ref 13–150)
FOLATE SERPL-MCNC: 5.79 NG/ML (ref 4.78–24.2)
GLOBULIN SER CALC-MCNC: 2.7 GM/DL
GLUCOSE SERPL-MCNC: 96 MG/DL (ref 65–99)
HBA1C MFR BLD: 6 % (ref 4.8–5.6)
HCT VFR BLD AUTO: 41 % (ref 34–46.6)
HDLC SERPL-MCNC: 41 MG/DL (ref 40–60)
HGB BLD-MCNC: 13.9 G/DL (ref 12–15.9)
IMM GRANULOCYTES # BLD AUTO: 0.06 10*3/MM3 (ref 0–0.05)
IMM GRANULOCYTES NFR BLD AUTO: 0.6 % (ref 0–0.5)
IRON SATN MFR SERPL: 16 % (ref 20–50)
IRON SERPL-MCNC: 65 MCG/DL (ref 37–145)
LDLC SERPL CALC-MCNC: 124 MG/DL (ref 0–100)
LDLC/HDLC SERPL: 2.91 {RATIO}
LYMPHOCYTES # BLD AUTO: 4.59 10*3/MM3 (ref 0.7–3.1)
LYMPHOCYTES NFR BLD AUTO: 43.5 % (ref 19.6–45.3)
MCH RBC QN AUTO: 29.1 PG (ref 26.6–33)
MCHC RBC AUTO-ENTMCNC: 33.9 G/DL (ref 31.5–35.7)
MCV RBC AUTO: 85.8 FL (ref 79–97)
MONOCYTES # BLD AUTO: 0.77 10*3/MM3 (ref 0.1–0.9)
MONOCYTES NFR BLD AUTO: 7.3 % (ref 5–12)
NEUTROPHILS # BLD AUTO: 4.79 10*3/MM3 (ref 1.7–7)
NEUTROPHILS NFR BLD AUTO: 45.4 % (ref 42.7–76)
NRBC BLD AUTO-RTO: 0 /100 WBC (ref 0–0.2)
PLATELET # BLD AUTO: 351 10*3/MM3 (ref 140–450)
POTASSIUM SERPL-SCNC: 4.3 MMOL/L (ref 3.5–5.2)
PROT SERPL-MCNC: 6.8 G/DL (ref 6–8.5)
RBC # BLD AUTO: 4.78 10*6/MM3 (ref 3.77–5.28)
SODIUM SERPL-SCNC: 140 MMOL/L (ref 136–145)
T3FREE SERPL-MCNC: 3.2 PG/ML (ref 2–4.4)
T4 FREE SERPL-MCNC: 1.13 NG/DL (ref 0.93–1.7)
TIBC SERPL-MCNC: 403 MCG/DL
TRIGL SERPL-MCNC: 188 MG/DL (ref 0–150)
TSH SERPL DL<=0.005 MIU/L-ACNC: 2.06 UIU/ML (ref 0.27–4.2)
UIBC SERPL-MCNC: 338 MCG/DL (ref 112–346)
URATE SERPL-MCNC: 4.5 MG/DL (ref 2.4–5.7)
VIT B1 BLD-SCNC: 128.2 NMOL/L (ref 66.5–200)
VIT B12 SERPL-MCNC: 305 PG/ML (ref 211–946)
VIT B6 SERPL-MCNC: 5.2 UG/L (ref 2–32.8)
VLDLC SERPL CALC-MCNC: 33 MG/DL (ref 5–40)
WBC # BLD AUTO: 10.54 10*3/MM3 (ref 3.4–10.8)

## 2021-02-17 NOTE — TELEPHONE ENCOUNTER
See result note.  Please let patient know that the B vitamin results seem to take much longer and must have been the delay in her labs.

## 2021-02-19 DIAGNOSIS — D72.820 LYMPHOCYTOSIS: Primary | ICD-10-CM

## 2021-03-01 ENCOUNTER — LAB (OUTPATIENT)
Dept: LAB | Facility: HOSPITAL | Age: 51
End: 2021-03-01

## 2021-03-01 ENCOUNTER — CONSULT (OUTPATIENT)
Dept: ONCOLOGY | Facility: CLINIC | Age: 51
End: 2021-03-01

## 2021-03-01 VITALS
OXYGEN SATURATION: 99 % | HEART RATE: 89 BPM | SYSTOLIC BLOOD PRESSURE: 131 MMHG | DIASTOLIC BLOOD PRESSURE: 84 MMHG | WEIGHT: 203.7 LBS | BODY MASS INDEX: 36.09 KG/M2 | HEIGHT: 63 IN | RESPIRATION RATE: 14 BRPM | TEMPERATURE: 97.7 F

## 2021-03-01 DIAGNOSIS — R53.83 FATIGUE, UNSPECIFIED TYPE: ICD-10-CM

## 2021-03-01 DIAGNOSIS — D72.820 LYMPHOCYTOSIS: Primary | ICD-10-CM

## 2021-03-01 LAB
BASOPHILS # BLD AUTO: 0.12 10*3/MM3 (ref 0–0.2)
BASOPHILS NFR BLD AUTO: 0.9 % (ref 0–1.5)
DEPRECATED RDW RBC AUTO: 43.1 FL (ref 37–54)
EOSINOPHIL # BLD AUTO: 0.2 10*3/MM3 (ref 0–0.4)
EOSINOPHIL NFR BLD AUTO: 1.5 % (ref 0.3–6.2)
ERYTHROCYTE [DISTWIDTH] IN BLOOD BY AUTOMATED COUNT: 13.6 % (ref 12.3–15.4)
ERYTHROCYTE [SEDIMENTATION RATE] IN BLOOD: 10 MM/HR (ref 0–20)
HCT VFR BLD AUTO: 42.2 % (ref 34–46.6)
HGB BLD-MCNC: 14 G/DL (ref 12–15.9)
IMM GRANULOCYTES # BLD AUTO: 0.12 10*3/MM3 (ref 0–0.05)
IMM GRANULOCYTES NFR BLD AUTO: 0.9 % (ref 0–0.5)
LDH SERPL-CCNC: 159 U/L (ref 99–259)
LYMPHOCYTES # BLD AUTO: 3.65 10*3/MM3 (ref 0.7–3.1)
LYMPHOCYTES NFR BLD AUTO: 28.1 % (ref 19.6–45.3)
MCH RBC QN AUTO: 28.6 PG (ref 26.6–33)
MCHC RBC AUTO-ENTMCNC: 33.2 G/DL (ref 31.5–35.7)
MCV RBC AUTO: 86.1 FL (ref 79–97)
MONOCYTES # BLD AUTO: 1.17 10*3/MM3 (ref 0.1–0.9)
MONOCYTES NFR BLD AUTO: 9 % (ref 5–12)
NEUTROPHILS NFR BLD AUTO: 59.6 % (ref 42.7–76)
NEUTROPHILS NFR BLD AUTO: 7.72 10*3/MM3 (ref 1.7–7)
NRBC BLD AUTO-RTO: 0 /100 WBC (ref 0–0.2)
PLATELET # BLD AUTO: 313 10*3/MM3 (ref 140–450)
PMV BLD AUTO: 9.9 FL (ref 6–12)
RBC # BLD AUTO: 4.9 10*6/MM3 (ref 3.77–5.28)
WBC # BLD AUTO: 12.98 10*3/MM3 (ref 3.4–10.8)

## 2021-03-01 PROCEDURE — 85652 RBC SED RATE AUTOMATED: CPT | Performed by: INTERNAL MEDICINE

## 2021-03-01 PROCEDURE — 88185 FLOWCYTOMETRY/TC ADD-ON: CPT | Performed by: INTERNAL MEDICINE

## 2021-03-01 PROCEDURE — 88184 FLOWCYTOMETRY/ TC 1 MARKER: CPT | Performed by: INTERNAL MEDICINE

## 2021-03-01 PROCEDURE — 36415 COLL VENOUS BLD VENIPUNCTURE: CPT

## 2021-03-01 PROCEDURE — 99244 OFF/OP CNSLTJ NEW/EST MOD 40: CPT | Performed by: INTERNAL MEDICINE

## 2021-03-01 PROCEDURE — 85025 COMPLETE CBC W/AUTO DIFF WBC: CPT

## 2021-03-01 PROCEDURE — 83615 LACTATE (LD) (LDH) ENZYME: CPT | Performed by: INTERNAL MEDICINE

## 2021-03-01 NOTE — PROGRESS NOTES
Subjective     REASON FOR CONSULTATION:  lymphocytosis  Provide an opinion on any further workup or treatment                             REQUESTING PRACTITIONER:  BLAKE Mckenzie    RECORDS OBTAINED:  Records of the patients history including those obtained from the referring provider were reviewed and summarized in detail.    History of Present Illness   This is a 50-year-old woman with history of anxiety, irritable bowel syndrome, prediabetes, hyperlipidemia, vitamin D deficiency and restless leg syndrome.  Patient was referred to hematology for evaluation of lymphocytosis.  Reviewing her labs, the patient had history of leukocytosis with her white blood cell count ranging between 10.5 and 16.2 since 8/1/2017.  Her white blood cell differential at times has shown mild neutrophilia, mild monocytosis and mild lymphocytosis.  For example her absolute lymphocyte count on 10/17/2016 was 3.18, on 1/18/2018 3.96, on 3/20/2019 was 3.46 and on 2/2/2021 4.59.  The patient has not had anemia or thrombocytopenia.    The patient complains of fatigue and daytime somnolence.  She denies unusual weight loss and in fact has had weight gain.  She denies lymphadenopathy or night sweats.  She is behind on cancer screening including mammography and pelvic exams.  She smokes tobacco about 1 pack/day.  She has never had a colonoscopy.  She denies blood in the stool or changes in the bowel habits.  She does report snoring and awakening feeling unrested.    Past Medical History:   Diagnosis Date   • Abnormal CT of the chest 3/24/2019    Recheck CT chest 1/2020 with resolution of abnormality and no new abnormality   • Breast nodule     RIGHT   • DDD (degenerative disc disease), lumbar    • H/O Anxiety    • Heart palpitations     AT TIMES   • IBS (irritable bowel syndrome)    • Low back pain    • PONV (postoperative nausea and vomiting)    • Seasonal allergies         Past Surgical History:   Procedure Laterality Date   • BREAST  BIOPSY Right 8/9/2017    Procedure: RIGHT BREAST BIOPSY WITH NEEDLE LOCALIZATION ;  Surgeon: Enrico Emerson MD;  Location: [facility] OR;  Service:    • CHOLECYSTECTOMY  2002   • ENDOMETRIAL ABLATION W/ CASSANDRA  2006   • OOPHORECTOMY Left 1994   • TUBAL ABDOMINAL LIGATION  1995        Current Outpatient Medications on File Prior to Visit   Medication Sig Dispense Refill   • cyclobenzaprine (FLEXERIL) 5 MG tablet Take 1 tablet by mouth 3 (Three) Times a Day As Needed for Muscle Spasms. 90 tablet 1   • FLUoxetine (PROzac) 20 MG tablet Take 1 tablet by mouth Daily. 90 tablet 0   • fluticasone (FLONASE) 50 MCG/ACT nasal spray 2 sprays into the nostril(s) as directed by provider Daily. 16 g 3   • hydroCHLOROthiazide (MICROZIDE) 12.5 MG capsule Take 1 capsule by mouth Every Morning. 30 capsule 0   • rOPINIRole (REQUIP) 1 MG tablet TAKE 1 TABLET EVERY NIGHT TAKE ONE HOUR BEFORE BEDTIME 30 tablet 0   • [DISCONTINUED] ibuprofen (ADVIL,MOTRIN) 800 MG tablet Take 800 mg by mouth As Needed.     • [DISCONTINUED] ivermectin (STROMECTOL) 3 MG tablet tablet 5 po qd on days 1,2,8,9,15 25 tablet 0   • [DISCONTINUED] nitrofurantoin, macrocrystal-monohydrate, (Macrobid) 100 MG capsule Take 1 capsule by mouth 2 (Two) Times a Day. 14 capsule 0   • [DISCONTINUED] varenicline (Chantix) 1 MG tablet Chantix 1 mg tablet       No current facility-administered medications on file prior to visit.         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   Tobacco Use   • Smoking status: Current Every Day Smoker     Packs/day: 0.50     Years: 25.00     Pack years: 12.50     Types: Cigarettes     Start date: 1991   • Smokeless  "tobacco: Never Used   • Tobacco comment: Caffeine - 3-4 diet pepsi   Substance and Sexual Activity   • Alcohol use: No     Frequency: Never     Comment: social   • Drug use: No   • Sexual activity: Yes     Partners: Male     Birth control/protection: Surgical        Family History   Problem Relation Age of Onset   • Thyroid disease Mother    • Heart disease Mother    • Depression Mother    • Hypothyroidism Mother    • Arthritis Mother    • Stroke Father    • Hyperlipidemia Father    • Heart disease Father    • Diabetes Father    • Heart attack Father    • Arthritis Father    • Lupus Sister    • Heart disease Maternal Grandmother    • Alcohol abuse Maternal Grandmother    • Lung cancer Maternal Grandmother    • Stroke Maternal Grandmother    • Heart disease Maternal Grandfather    • Heart attack Maternal Grandfather    • Heart disease Paternal Grandmother    • Lung cancer Paternal Grandmother    • Aneurysm Sister    • Heart disease Sister    • Cerebral aneurysm Sister    • Depression Daughter    • Hypertension Daughter    • Hypothyroidism Daughter    • Diabetes Paternal Aunt    • Diabetes Paternal Uncle         Review of Systems   Constitutional: Positive for fatigue. Negative for fever and unexpected weight change.   HENT: Negative.    Respiratory: Positive for shortness of breath. Negative for cough and wheezing.    Cardiovascular: Negative.    Gastrointestinal: Negative.    Endocrine: Negative.    Genitourinary: Negative.    Musculoskeletal: Positive for arthralgias.   Allergic/Immunologic: Negative.    Neurological: Positive for dizziness.   Hematological: Negative.    Psychiatric/Behavioral: Negative.         Objective     Vitals:    03/01/21 0751   BP: 131/84   Pulse: 89   Resp: 14   Temp: 97.7 °F (36.5 °C)   TempSrc: Infrared   SpO2: 99%   Weight: 92.4 kg (203 lb 11.2 oz)   Height: 160 cm (62.99\")   PainSc: 0-No pain     Current Status 3/1/2021   ECOG score 0       Physical Exam    CONSTITUTIONAL: pleasant " well-developed adult woman  HEENT: no icterus, no thrush, moist membranes  NECK: no jvd  LYMPH: no cervical or supraclavicular lad  CV: RRR, S1S2, no murmur  RESP: cta bilat, no wheezing, no rales  GI: soft, non-tender, no splenomegaly, +bs  MUSC: no edema, normal gait  NEURO: alert and oriented x3, normal strength  PSYCH: normal mood, somewhat anxious affect  Skin: No bruising, petechiae, rash or induration noted.  There is a 1 cm cystlike area on the mid back    RECENT LABS:  Hematology WBC   Date Value Ref Range Status   03/01/2021 12.98 (H) 3.40 - 10.80 10*3/mm3 Final   02/02/2021 10.54 3.40 - 10.80 10*3/mm3 Final     RBC   Date Value Ref Range Status   03/01/2021 4.90 3.77 - 5.28 10*6/mm3 Final   02/02/2021 4.78 3.77 - 5.28 10*6/mm3 Final     Hemoglobin   Date Value Ref Range Status   03/01/2021 14.0 12.0 - 15.9 g/dL Final     Hematocrit   Date Value Ref Range Status   03/01/2021 42.2 34.0 - 46.6 % Final     Platelets   Date Value Ref Range Status   03/01/2021 313 140 - 450 10*3/mm3 Final        Lab Results   Component Value Date    GLUCOSE 108 (H) 03/20/2019    BUN 17 02/02/2021    CREATININE 0.87 02/02/2021    EGFRIFNONA 69 02/02/2021    EGFRIFAFRI 84 02/02/2021    BCR 19.5 02/02/2021    K 4.3 02/02/2021    CO2 26.2 02/02/2021    CALCIUM 9.4 02/02/2021    PROTENTOTREF 6.8 02/02/2021    ALBUMIN 4.10 02/02/2021    LABIL2 1.5 02/02/2021    AST 14 02/02/2021    ALT 25 02/02/2021     CT chest 1/28/2020 reviewed and showed resolution of a groundglass opacity previously noted by CT 3/22/2019 in the right lower lobe, no new acute abnormalities    Assessment/Plan   1.  Chronic leukocytosis with intermittent neutrophilia, lymphocytosis, monocytosis.  Given the chronicity and fluctuating nature of the patient's CBC and differential this is most likely a reactive process.  I suspect the mild leukocytosis is related to smoking tobacco.  I will check a LDH, ESR and a peripheral blood flow cytometry to rule out abnormal  lymphoid or myeloid populations.  We will call the patient with results when available.  The patient was reassured that my suspicion for a hematologic disorder is low.  2.  Fatigue/somnolence: She reports snoring and awakening oftentimes feeling unrested.  I recommended she have a sleep evaluation.  She states she will discuss with her primary provider.  3.  The patient is behind cancer screening including mammography, pelvic exams and colonoscopy.  She states she will get these scheduled from her PCP  4.  Tobacco abuse: Strongly encouraged patient to stop smoking.  She states her  smokes as well and is difficult to stop when he is smoking in the home.  She is hopeful that he will be agreeable to stop smoking so that they can work towards cessation together.

## 2021-03-03 ENCOUNTER — TELEPHONE (OUTPATIENT)
Dept: ONCOLOGY | Facility: CLINIC | Age: 51
End: 2021-03-03

## 2021-03-03 LAB — REF LAB TEST METHOD: NORMAL

## 2021-03-03 NOTE — TELEPHONE ENCOUNTER
Called pt and informed her of message below. She V/U.   ----- Message from Ananda Rose MD sent at 3/3/2021  3:58 PM EST -----  Please let her know that her evaluation blood did not show any abnormal white blood cells.  I favor her mild increase in white blood cell count to be a reactive process.  This can be followed by her PCP.

## 2021-03-10 ENCOUNTER — OFFICE VISIT (OUTPATIENT)
Dept: FAMILY MEDICINE CLINIC | Facility: CLINIC | Age: 51
End: 2021-03-10

## 2021-03-10 VITALS
TEMPERATURE: 96.8 F | DIASTOLIC BLOOD PRESSURE: 76 MMHG | BODY MASS INDEX: 39.04 KG/M2 | HEART RATE: 74 BPM | SYSTOLIC BLOOD PRESSURE: 134 MMHG | OXYGEN SATURATION: 98 % | WEIGHT: 206.8 LBS | HEIGHT: 61 IN | RESPIRATION RATE: 20 BRPM

## 2021-03-10 DIAGNOSIS — M79.672 BILATERAL FOOT PAIN: ICD-10-CM

## 2021-03-10 DIAGNOSIS — E55.9 VITAMIN D DEFICIENCY: ICD-10-CM

## 2021-03-10 DIAGNOSIS — F41.8 MIXED ANXIETY DEPRESSIVE DISORDER: ICD-10-CM

## 2021-03-10 DIAGNOSIS — Z12.31 ENCOUNTER FOR SCREENING MAMMOGRAM FOR MALIGNANT NEOPLASM OF BREAST: ICD-10-CM

## 2021-03-10 DIAGNOSIS — G89.29 CHRONIC LEFT-SIDED LOW BACK PAIN WITHOUT SCIATICA: ICD-10-CM

## 2021-03-10 DIAGNOSIS — E78.2 MIXED HYPERLIPIDEMIA: ICD-10-CM

## 2021-03-10 DIAGNOSIS — M50.90 DISC DISORDER OF CERVICAL REGION: ICD-10-CM

## 2021-03-10 DIAGNOSIS — M54.50 CHRONIC LEFT-SIDED LOW BACK PAIN WITHOUT SCIATICA: ICD-10-CM

## 2021-03-10 DIAGNOSIS — R73.03 PREDIABETES: ICD-10-CM

## 2021-03-10 DIAGNOSIS — E53.8 BIOTIN DEFICIENCY DISEASE: ICD-10-CM

## 2021-03-10 DIAGNOSIS — D72.829 LEUKOCYTOSIS, UNSPECIFIED TYPE: ICD-10-CM

## 2021-03-10 DIAGNOSIS — R53.83 FATIGUE, UNSPECIFIED TYPE: ICD-10-CM

## 2021-03-10 DIAGNOSIS — E53.8 B12 DEFICIENCY: ICD-10-CM

## 2021-03-10 DIAGNOSIS — D72.820 LYMPHOCYTOSIS: ICD-10-CM

## 2021-03-10 DIAGNOSIS — Z00.00 ROUTINE ADULT HEALTH MAINTENANCE: Primary | ICD-10-CM

## 2021-03-10 DIAGNOSIS — E61.1 IRON DEFICIENCY: ICD-10-CM

## 2021-03-10 DIAGNOSIS — R40.0 DAYTIME SOMNOLENCE: ICD-10-CM

## 2021-03-10 DIAGNOSIS — G47.9 SLEEP DISTURBANCE: ICD-10-CM

## 2021-03-10 DIAGNOSIS — Z12.11 SCREEN FOR COLON CANCER: ICD-10-CM

## 2021-03-10 DIAGNOSIS — R60.0 PEDAL EDEMA: ICD-10-CM

## 2021-03-10 DIAGNOSIS — M79.671 BILATERAL FOOT PAIN: ICD-10-CM

## 2021-03-10 DIAGNOSIS — G25.81 RESTLESS LEG SYNDROME, UNCONTROLLED: ICD-10-CM

## 2021-03-10 DIAGNOSIS — J30.9 ALLERGIC RHINITIS, UNSPECIFIED SEASONALITY, UNSPECIFIED TRIGGER: ICD-10-CM

## 2021-03-10 PROCEDURE — 99396 PREV VISIT EST AGE 40-64: CPT | Performed by: PHYSICIAN ASSISTANT

## 2021-03-10 PROCEDURE — 99214 OFFICE O/P EST MOD 30 MIN: CPT | Performed by: PHYSICIAN ASSISTANT

## 2021-03-10 RX ORDER — LANOLIN ALCOHOL/MO/W.PET/CERES
1000 CREAM (GRAM) TOPICAL DAILY
COMMUNITY

## 2021-03-10 RX ORDER — BIOTIN 1 MG
1000 TABLET ORAL DAILY
COMMUNITY

## 2021-03-10 RX ORDER — MELATONIN
1000 DAILY
COMMUNITY

## 2021-03-10 NOTE — PROGRESS NOTES
"Chief Complaint  Annual Exam (yearly cpe review labs ) and Hyperlipidemia    Subjective     {Problem List  Visit Diagnosis   Encounters  Notes  Medications  Labs  Result Review Imaging  Media :23}     Mary Banda presents to [facility]  History of Present Illness    Objective   Vital Signs:   /76 (BP Location: Left arm, Patient Position: Sitting)   Pulse 74   Temp 96.8 °F (36 °C) (Temporal)   Resp 20   Ht 154.9 cm (61\")   Wt 93.8 kg (206 lb 12.8 oz)   SpO2 98%   BMI 39.07 kg/m²     Physical Exam   Result Review :{Labs  Result Review  Imaging  Med Tab  Media :23}   {The following data was reviewed by (Optional):91924}  {Ambulatory Labs (Optional):60930}  {Data reviewed (Optional):75745:::1}          Assessment and Plan {CC Problem List  Visit Diagnosis  ROS  Review (Popup)  Health Maintenance  Quality  BestPractice  Medications  SmartSets  SnapShot Encounters  Media :23}   Diagnoses and all orders for this visit:    1. Screen for colon cancer (Primary)  -     Cologuard - Stool, Per Rectum; Future      {Time Spent (Optional):98280}  Follow Up {Instructions Charge Capture  Follow-up Communications :23}  No follow-ups on file.  Patient was given instructions and counseling regarding her condition or for health maintenance advice. Please see specific information pulled into the AVS if appropriate.       "

## 2021-03-10 NOTE — PROGRESS NOTES
Subjective   Mary Banda is a 50 y.o. female who presents today in follow up of hyperlipidemia, prediabetes, vitamin D, leukocytosis, RLS, fatigue, edema, moods, allergic rhinitis, back and neck pain, foot pain, and specialists. She has also had a knot on her back x several months.    History of Present Illness     She reports she has a knot on her back for several months. No changes, pain, or itching. Small.     Mixed hyperlipidemia- patient is on no medication. Has not been diligent with diet or exercise.   Prediabetes- A1C has remained about 5.7% but has not been checked in a couple years. No medications.   Vitamin D deficiency- not taking vitamin D regularly  B12 deficiency- advised to take B12 500 mcg daily 2/2021  Biotin deficiency- per labs 2/2021, to take Biotin supplement  Iron deficiency- low iron saturation but iron was ok. Referred to hematology 2/2021  Leukocytosis- no signs of infection or malignancy, recent unexpected weight loss. She continues to smoke- Rx Chantix 12/2020. She was referred to hematology 2/2021.     RLS- Has not had sleep study. Dx by Roberta. Continues Requip 1 mg once daily- working well- not taking every night. The 1 mg tablet makes her too sleepy, so she does not take it. Only takes if her legs are bothersome.    Fatigue/snoring- tired during the day. If she sits down, she falls asleep. Snoring- loud but no reported apnea.     Edema- Patient has Rx from previous PCP for HCTZ 12.5 mg daily. Taking 1-2 x weekly. Does not have some puffiness in hands. Notices the day after a salty. No SOA associated with swelling.     Moods- she thinks she is feeling something. She has noticed that anxiety is causing SOA. Still having but not as often. Wants to stay on the same dose. Did not do well on 40 mg- sexual AE.   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 Now has mother in law living with her and work changes with  Covid. Having increased anxiety and has noticed her BP was up.   · In the past, was on Prozac 20 mg daily- worked very well then wasn't enough and increased it and was too much- sexual AE. No mood AE with increased dosing. No SI/HI, concerns for safety. Lexapro- didn't help and may have made symptoms worse. Wellbutrin- didn't help- did nothing.     Allergic rhinitis- She continues Flonase as needed.     Low back pain with sciatica, cervical DDD- Flexeril- takes as needed- has not taken in months.   Foot pain- Patient complained of bilateral foot pain x > 1 year. When she is on her feet all day, waking up in the morning, and after sitting down in the morning then getting back up, she has lateral foot pain. She tried padded toe separators for possible bunion but no inserts. She thought they helped when she wore them but did not tolerate well.     Patient's Specialists:  Cardiology- Dr Horn- last appt 3/2019 for precordial chest pain, palpitations, abnormal CT chest, FHx tachycardia mediated cardiomyopathy, premature CAD, FHx cerebral aneurysm. Referred for CTA chest, echo, and holter monitor. CTA with patchy infiltrate- started on Levaquin and pulmonology follow up in 1 week. Negative Echo and stress test and holter with 1 episode of atrial tach- advised avoid stimulants, caffeine, alcohol, chocolate, and follow up in 6 months. Patient no show follow up.    General surgery- Dr Emerson- breast biopsy 8/2017  Neurosurgery- HOMERO Horn- last appt 5/2017 for low back pain with sciatica. 4/2017 MRI mild scoliosis centered at L3-4, mild degenerative changes, L5-S1 right sided neuroforaminal narrowing. Follow up PRN.   GYN- Dr Matthew Tamayo- last appt 11/2016 for well woman exam and galactorrhea. Normal exam, annual mammogram, labs. Follow up in 1 year. Follow up Dr Solo 7/29/2021.   Hematology- Dr Rose- last appt 3/1/2021 for chronic leukocytosis with intermittent neutrophilia, lymphocytosis, monocytosis, fatigue.  Thought to be reactive and related to smoking.  Recommend sleep medicine evaluation. Needed updated mammogram, pelvic exam, and colonoscopy as well as smoking cessation.   Hand surgery- Dr Melo- last appt 1/2020 for arthritis hand and trigger finger. Injection and was to follow up in 6 weeks. Patient did not follow up.   Pulmonology- Dr López Pleitez- last appt 3/2019 for SOA, tobacco use, and infiltrate. Started on Breo despite PF not classic for asthma, advised smoking cessation, and consider CT if persistent infiltrate on follow up imaging.   Orthopedic surgery- Dr Snider- last appt 8/2018 for right shoulder pain- rotator cuff and biceps tendonitis. Injection, PT and follow up in 4 weeks. Did not return.     The following portions of the patient's history were reviewed and updated as appropriate: allergies, current medications, past family history, past medical history, past social history, past surgical history and problem list.    Review of Systems   Constitutional: Positive for fatigue.   HENT: Negative.    Respiratory: Negative.    Cardiovascular: Positive for leg swelling. Negative for chest pain and palpitations.   Gastrointestinal: Negative.    Musculoskeletal: Positive for arthralgias, back pain, neck pain and neck stiffness.   Skin:        Subcutaneous nodule   Neurological: Negative.    Hematological: Negative.    Psychiatric/Behavioral: Negative.        Objective   Vitals:    03/10/21 0944   BP: 134/76   Pulse: 74   Resp: 20   Temp: 96.8 °F (36 °C)   SpO2: 98%     Body mass index is 39.07 kg/m².    Physical Exam  Vitals and nursing note reviewed.   Constitutional:       General: She is not in acute distress.     Appearance: Normal appearance. She is well-developed. She is not diaphoretic.   HENT:      Head: Normocephalic and atraumatic.      Right Ear: Hearing, tympanic membrane, ear canal and external ear normal.      Left Ear: Hearing, tympanic membrane, ear canal and external ear normal.       Nose: Nose normal.   Eyes:      General: Lids are normal.      Conjunctiva/sclera: Conjunctivae normal.      Pupils: Pupils are equal, round, and reactive to light.   Neck:      Thyroid: No thyroid mass or thyromegaly.      Vascular: No carotid bruit or JVD.      Trachea: No tracheal deviation.   Cardiovascular:      Rate and Rhythm: Normal rate and regular rhythm.      Pulses: Normal pulses.           Radial pulses are 2+ on the right side and 2+ on the left side.        Posterior tibial pulses are 2+ on the right side and 2+ on the left side.      Heart sounds: Normal heart sounds. No murmur heard.   No friction rub. No gallop.    Pulmonary:      Effort: Pulmonary effort is normal. No respiratory distress.      Breath sounds: Normal breath sounds. No wheezing, rhonchi or rales.   Abdominal:      General: Bowel sounds are normal. There is no distension or abdominal bruit.      Palpations: Abdomen is soft. Abdomen is not rigid.      Tenderness: There is no abdominal tenderness. There is no guarding or rebound.      Hernia: No hernia is present.   Musculoskeletal:         General: No tenderness or deformity. Normal range of motion.      Cervical back: Neck supple.      Comments: Normal strength.   Lymphadenopathy:      Cervical: No cervical adenopathy.   Skin:     General: Skin is warm and dry.      Findings: No erythema or rash.      Comments: Small subcutaneous lesion back- mobile without erythema, edema, induration   Neurological:      Mental Status: She is alert and oriented to person, place, and time.      Cranial Nerves: No cranial nerve deficit.      Sensory: No sensory deficit.      Motor: No abnormal muscle tone.      Coordination: Coordination normal.      Gait: Gait normal.      Deep Tendon Reflexes: Reflexes are normal and symmetric. Reflexes normal.   Psychiatric:         Mood and Affect: Mood normal.         Speech: Speech normal.         Behavior: Behavior normal.         Thought Content: Thought  content normal.         Judgment: Judgment normal.         Assessment/Plan   Diagnoses and all orders for this visit:    1. Routine adult health maintenance (Primary)  -     CBC & Differential; Future  -     Comprehensive Metabolic Panel; Future  -     Hemoglobin A1c; Future  -     CK; Future  -     Lipid Panel With LDL / HDL Ratio; Future  -     Iron and TIBC; Future  -     Ferritin; Future  -     Vitamin B12 & Folate; Future  -     Vitamin D 25 Hydroxy; Future  -     Vitamin B7 (Biotin); Future  -     Mammo Screening Bilateral With CAD    2. Screen for colon cancer  -     Cologuard - Stool, Per Rectum; Future    3. Encounter for screening mammogram for malignant neoplasm of breast  -     Mammo Screening Bilateral With CAD    4. Mixed hyperlipidemia  -     Comprehensive Metabolic Panel; Future  -     Hemoglobin A1c; Future  -     CK; Future  -     Lipid Panel With LDL / HDL Ratio; Future  -     Follicle Stimulating Hormone    5. Prediabetes  -     Comprehensive Metabolic Panel; Future  -     Hemoglobin A1c; Future  -     Follicle Stimulating Hormone    6. Vitamin D deficiency  -     Comprehensive Metabolic Panel; Future  -     Vitamin D 25 Hydroxy; Future  -     Follicle Stimulating Hormone    7. Leukocytosis, unspecified type  -     CBC & Differential; Future    8. Lymphocytosis  -     CBC & Differential; Future    9. B12 deficiency  -     CBC & Differential; Future  -     Vitamin B12 & Folate; Future    10. Biotin deficiency disease  -     CBC & Differential; Future  -     Vitamin B7 (Biotin); Future    11. Iron deficiency  -     CBC & Differential; Future  -     Iron and TIBC; Future  -     Ferritin; Future    12. Restless leg syndrome, uncontrolled    13. Fatigue, unspecified type    14. Daytime somnolence  -     Ambulatory Referral to Sleep Medicine    15. Pedal edema    16. Sleep disturbance  -     Ambulatory Referral to Sleep Medicine    17. Mixed anxiety depressive disorder    18. Allergic rhinitis,  unspecified seasonality, unspecified trigger    19. Chronic left-sided low back pain without sciatica    20. Disc disorder of cervical region    21. Bilateral foot pain        Assessment and plan  CPE completed today. Small subcutaneous lesion without irritation but otherwise normal exam. She needs updated GYN exam, PAP, mammogram, colonoscopy, and should consider Covid 19 injection. She has upcoming appt GYN 7/2021 and I will refer for mammogram and colonoscopy. Preventative counseling for diet, exercise, healthy lifestyle, taking vitamins as directed, and ensuring health maintenance is up to date.     Patient is due for follow up with fasting labs 6/2021 for re-evaluation of lipids, A1C, B12, B7, vitamin D, and iron and for follow up of chronic conditions, moods and specialists. To be seen sooner if any other concerns.     · Mixed hyperlipidemia- 2/2021 cholesterol was up about 30 points, bad cholesterol was elevated, and TG were elevated. She was advised to work on diet and exercise. Recheck at follow up.   · Prediabetes- A1C increased from 5.7 - 6.0% 2/2021. Patient to work diligently on diet and exercise. I will recheck with next labs.    · Vitamin D deficiency- Take vitamin D 2000 IU daily.    · B12 deficiency- Patient should take B12 500 mcg daily.   · Biotin deficiency- Biotin supplement recommended 2/2021.   · Iron deficiency- She should increase dietary iron and await follow up labs.   · Leukocytosis-This was thought to be from smoking history, however, I wanted hematology consultation to ensure no further workup or treatment is needed. Additional labs and thought to be reactive, likely from smoking.   · RLS- Symptoms are stable. Continue Requip 1 mg once daily as needed. I will refer to sleep medicine for sleep study for formal diagnosis.   · Fatigue- Labs with vitamin B12, B7, and vitamin D deficiency as well as slightly low iron. She will take supplements as directed.  With snoring and fatigue, I will  also refer to medicine.  · Edema- I discussed with patient that PRN use of diuretics is not common and that edema is either a sign of underlying CHF or other chronic conditions or is related to lifestyle, diet, and exercise. I recommend Compression stockings, decrease sugars, starches, and salts, increase water, and ensure proper exercise daily. I will refer to sleep medicine. If persistent edema, we should consider cardiology referral and further workup.   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  She has had improvement on Prozac 20 mg daily and previously failed higher doses due to side effects. I will continue same dose and re-evaluate at follow up.  · Allergic rhinitis- Continue Flonase 2 sprays in each nostril once daily and claritin or zyrtec 10 mg once daily as needed.   · Low back pain with sciatica- Flexeril is working well and only used occasionally.  Follow-up if worsening pain, new or changing symptoms.  · Foot pain-I will give stretches and exercises for plantar fasciitis and advised that she roll her foot on a frozen water bottle 3-4 times a day x10 to 15 minutes.  I will also refer to podiatry, as her pain has been present for a year.  I did asked that she go to [employer] or [employer] to see about more custom shoe fit.  ·     Patient has seen specialists as noted above.  I have reviewed available records, including consult notes, labs, and imaging/testing.  Patient to follow-up with specialists as directed by them.    I spent 45 minutes cariting for Mary Banda on this date of service. This time includes time spent by me in the following activities as necessary: preparing for the visit, reviewing tests, specialists records and previous visits, obtaining and/or reviewing a separately obtained history, performing a medically appropriate exam and/or evaluation, counseling and  educating the patient, family, garegiver, referring and/or communicating with other healthcare professionals, documenting information in the medical record, independently interpreting results and communicating that information with the patient, family, caregiver, and developing a medically appropriate treatment plan with consideration of other conditions, medications, and treatments.

## 2021-03-10 NOTE — PROGRESS NOTES
Subjective   Mary Banda is a 50 y.o. female who presents today for CPE.     History of Present Illness     Last Pap- last 2017. has appt 7/2021- Dr Solo.   Last mammogram- 2019- normal.    Last DEXA- has not had. LMP- 16 years ago.  Last colonoscopy- has not had- no FHx breast, colon, ovarian, or uterine cancer. Sister with colon polyps- benign. Parents have been ok.   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     Past Medical History:   Diagnosis Date   • Abnormal CT of the chest 3/24/2019    Recheck CT chest 1/2020 with resolution of abnormality and no new abnormality   • Breast nodule     RIGHT   • DDD (degenerative disc disease), lumbar    • H/O Anxiety    • Heart palpitations     AT TIMES   • IBS (irritable bowel syndrome)    • Low back pain    • PONV (postoperative nausea and vomiting)    • Seasonal allergies      Past Surgical History:   Procedure Laterality Date   • BREAST BIOPSY Right 8/9/2017    Procedure: RIGHT BREAST BIOPSY WITH NEEDLE LOCALIZATION ;  Surgeon: Enrico Emerson MD;  Location: [facility];  Service:    • BREAST BIOPSY Right 03/2017    U/S guided bx- non-atypical ductal hyperplasia   • CHOLECYSTECTOMY  2002   • ENDOMETRIAL ABLATION W/ NOVASURE  2006   • OOPHORECTOMY Left 1994    Part of left ovary removed   • TUBAL ABDOMINAL LIGATION  1995     Social History     Socioeconomic History   • Marital status:      Spouse name: Ty   • Number of children: Not on file   • Years of education: College   • Highest education level: Not on file   Tobacco Use   • Smoking status: Current Every Day Smoker     Packs/day: 0.50     Years: 25.00     Pack years: 12.50     Types: Cigarettes     Start date: 1991   • Smokeless tobacco: Never Used   • Tobacco comment: Caffeine - 3-4 diet pepsi   Substance and Sexual Activity   • Alcohol use: No     Comment: social   • Drug use: No   • Sexual activity: Yes     Partners:  Male     Birth control/protection: Surgical      Family History   Problem Relation Age of Onset   • Thyroid disease Mother    • Heart disease Mother    • Depression Mother    • Hypothyroidism Mother    • Arthritis Mother    • Stroke Father    • Hyperlipidemia Father    • Heart disease Father    • Diabetes Father    • Heart attack Father    • Arthritis Father    • Lupus Sister    • Heart disease Maternal Grandmother    • Alcohol abuse Maternal Grandmother    • Lung cancer Maternal Grandmother    • Stroke Maternal Grandmother    • Heart disease Maternal Grandfather    • Heart attack Maternal Grandfather    • Heart disease Paternal Grandmother    • Lung cancer Paternal Grandmother    • Aneurysm Sister    • Heart disease Sister    • Cerebral aneurysm Sister    • Depression Daughter    • Hypertension Daughter    • Hypothyroidism Daughter    • Diabetes Paternal Aunt    • Diabetes Paternal Uncle         The following portions of the patient's history were reviewed and updated as appropriate: allergies, current medications, past family history, past medical history, past social history, past surgical history and problem list.    Review of Systems   Constitutional: Positive for fatigue.   HENT: Negative.    Respiratory: Negative.    Cardiovascular: Positive for leg swelling. Negative for chest pain and palpitations.   Gastrointestinal: Negative.    Genitourinary: Negative.    Musculoskeletal: Positive for arthralgias, back pain, neck pain and neck stiffness.   Skin:        Skin lesion on back   Neurological: Negative.    Hematological: Negative.    Psychiatric/Behavioral: Negative.        Objective   Vitals:    03/10/21 0944   BP: 134/76   Pulse: 74   Resp: 20   Temp: 96.8 °F (36 °C)   SpO2: 98%     Body mass index is 39.07 kg/m².    Physical Exam  Vitals and nursing note reviewed.   Constitutional:       General: She is not in acute distress.     Appearance: Normal appearance. She is well-developed. She is not diaphoretic.    HENT:      Head: Normocephalic and atraumatic.      Right Ear: Hearing, tympanic membrane, ear canal and external ear normal.      Left Ear: Hearing, tympanic membrane, ear canal and external ear normal.      Nose: Nose normal.   Eyes:      General: Lids are normal.      Conjunctiva/sclera: Conjunctivae normal.      Pupils: Pupils are equal, round, and reactive to light.   Neck:      Thyroid: No thyroid mass or thyromegaly.      Vascular: No carotid bruit or JVD.      Trachea: No tracheal deviation.   Cardiovascular:      Rate and Rhythm: Normal rate and regular rhythm.      Pulses: Normal pulses.           Radial pulses are 2+ on the right side and 2+ on the left side.        Posterior tibial pulses are 2+ on the right side and 2+ on the left side.      Heart sounds: Normal heart sounds. No murmur heard.   No friction rub. No gallop.    Pulmonary:      Effort: Pulmonary effort is normal. No respiratory distress.      Breath sounds: Normal breath sounds. No wheezing, rhonchi or rales.   Abdominal:      General: Bowel sounds are normal. There is no distension or abdominal bruit.      Palpations: Abdomen is soft. Abdomen is not rigid.      Tenderness: There is no abdominal tenderness. There is no guarding or rebound.      Hernia: No hernia is present.   Musculoskeletal:         General: No tenderness or deformity. Normal range of motion.      Cervical back: Neck supple.      Comments: Normal strength.   Lymphadenopathy:      Cervical: No cervical adenopathy.   Skin:     General: Skin is warm and dry.      Findings: No erythema or rash.      Comments: Small subcutaneous lesion back- mobile without erythema, edema, induration.    Neurological:      Mental Status: She is alert and oriented to person, place, and time.      Cranial Nerves: No cranial nerve deficit.      Sensory: No sensory deficit.      Motor: No abnormal muscle tone.      Coordination: Coordination normal.      Gait: Gait normal.      Deep Tendon  Reflexes: Reflexes are normal and symmetric. Reflexes normal.   Psychiatric:         Mood and Affect: Mood normal.         Speech: Speech normal.         Behavior: Behavior normal.         Thought Content: Thought content normal.         Judgment: Judgment normal.         Assessment/Plan   Diagnoses and all orders for this visit:    1. Routine adult health maintenance (Primary)  -     CBC & Differential; Future  -     Comprehensive Metabolic Panel; Future  -     Hemoglobin A1c; Future  -     CK; Future  -     Lipid Panel With LDL / HDL Ratio; Future  -     Iron and TIBC; Future  -     Ferritin; Future  -     Vitamin B12 & Folate; Future  -     Vitamin D 25 Hydroxy; Future  -     Vitamin B7 (Biotin); Future  -     Mammo Screening Bilateral With CAD    2. Screen for colon cancer  -     Cologuard - Stool, Per Rectum; Future    3. Encounter for screening mammogram for malignant neoplasm of breast  -     Mammo Screening Bilateral With CAD    4. Mixed hyperlipidemia  -     Comprehensive Metabolic Panel; Future  -     Hemoglobin A1c; Future  -     CK; Future  -     Lipid Panel With LDL / HDL Ratio; Future  -     Follicle Stimulating Hormone    5. Prediabetes  -     Comprehensive Metabolic Panel; Future  -     Hemoglobin A1c; Future  -     Follicle Stimulating Hormone    6. Vitamin D deficiency  -     Comprehensive Metabolic Panel; Future  -     Vitamin D 25 Hydroxy; Future  -     Follicle Stimulating Hormone    7. Leukocytosis, unspecified type  -     CBC & Differential; Future    8. Lymphocytosis  -     CBC & Differential; Future    9. B12 deficiency  -     CBC & Differential; Future  -     Vitamin B12 & Folate; Future    10. Biotin deficiency disease  -     CBC & Differential; Future  -     Vitamin B7 (Biotin); Future    11. Iron deficiency  -     CBC & Differential; Future  -     Iron and TIBC; Future  -     Ferritin; Future    12. Restless leg syndrome, uncontrolled    13. Fatigue, unspecified type    14. Daytime  somnolence  -     Ambulatory Referral to Sleep Medicine    15. Pedal edema    16. Sleep disturbance  -     Ambulatory Referral to Sleep Medicine    17. Mixed anxiety depressive disorder    18. Allergic rhinitis, unspecified seasonality, unspecified trigger    19. Chronic left-sided low back pain without sciatica    20. Disc disorder of cervical region    21. Bilateral foot pain        Assessment and Plan  CPE completed today. Small subcutaneous lesion without irritation but otherwise normal exam. She needs updated GYN exam, PAP, mammogram, colonoscopy, and should consider Covid 19 injection. She has upcoming appt GYN 7/2021 and I will refer for mammogram and colonoscopy. Preventative counseling for diet, exercise, healthy lifestyle, taking vitamins as directed, and ensuring health maintenance is up to date.

## 2021-03-18 ENCOUNTER — TRANSCRIBE ORDERS (OUTPATIENT)
Dept: ADMINISTRATIVE | Facility: HOSPITAL | Age: 51
End: 2021-03-18

## 2021-03-18 DIAGNOSIS — Z12.31 SCREENING MAMMOGRAM, ENCOUNTER FOR: Primary | ICD-10-CM

## 2021-03-24 ENCOUNTER — HOSPITAL ENCOUNTER (OUTPATIENT)
Dept: MAMMOGRAPHY | Facility: HOSPITAL | Age: 51
Discharge: HOME | End: 2021-03-24
Admitting: PHYSICIAN ASSISTANT

## 2021-03-24 DIAGNOSIS — Z12.31 SCREENING MAMMOGRAM, ENCOUNTER FOR: ICD-10-CM

## 2021-03-24 PROCEDURE — 77063 BREAST TOMOSYNTHESIS BI: CPT

## 2021-03-24 PROCEDURE — 77067 SCR MAMMO BI INCL CAD: CPT

## 2021-03-26 DIAGNOSIS — R92.8 ABNORMAL MAMMOGRAM OF RIGHT BREAST: Primary | ICD-10-CM

## 2021-04-08 ENCOUNTER — APPOINTMENT (OUTPATIENT)
Dept: SLEEP MEDICINE | Facility: HOSPITAL | Age: 51
End: 2021-04-08

## 2021-04-16 ENCOUNTER — HOSPITAL ENCOUNTER (OUTPATIENT)
Dept: MAMMOGRAPHY | Facility: HOSPITAL | Age: 51
Discharge: HOME | End: 2021-04-16

## 2021-04-16 ENCOUNTER — HOSPITAL ENCOUNTER (OUTPATIENT)
Dept: ULTRASOUND IMAGING | Facility: HOSPITAL | Age: 51
Discharge: HOME | End: 2021-04-16

## 2021-04-16 PROCEDURE — 77065 DX MAMMO INCL CAD UNI: CPT

## 2021-04-16 PROCEDURE — G0279 TOMOSYNTHESIS, MAMMO: HCPCS

## 2021-04-16 PROCEDURE — 76642 ULTRASOUND BREAST LIMITED: CPT

## 2021-06-09 ENCOUNTER — OFFICE VISIT (OUTPATIENT)
Dept: FAMILY MEDICINE CLINIC | Facility: CLINIC | Age: 51
End: 2021-06-09

## 2021-06-09 VITALS
WEIGHT: 207 LBS | SYSTOLIC BLOOD PRESSURE: 118 MMHG | HEIGHT: 63 IN | TEMPERATURE: 97.8 F | DIASTOLIC BLOOD PRESSURE: 74 MMHG | HEART RATE: 86 BPM | BODY MASS INDEX: 36.68 KG/M2

## 2021-06-09 DIAGNOSIS — E61.1 IRON DEFICIENCY: ICD-10-CM

## 2021-06-09 DIAGNOSIS — E55.9 VITAMIN D DEFICIENCY: ICD-10-CM

## 2021-06-09 DIAGNOSIS — R73.03 PREDIABETES: ICD-10-CM

## 2021-06-09 DIAGNOSIS — J30.9 ALLERGIC RHINITIS, UNSPECIFIED SEASONALITY, UNSPECIFIED TRIGGER: ICD-10-CM

## 2021-06-09 DIAGNOSIS — D72.820 LYMPHOCYTOSIS: ICD-10-CM

## 2021-06-09 DIAGNOSIS — M54.50 CHRONIC LEFT-SIDED LOW BACK PAIN WITHOUT SCIATICA: ICD-10-CM

## 2021-06-09 DIAGNOSIS — E78.2 MIXED HYPERLIPIDEMIA: ICD-10-CM

## 2021-06-09 DIAGNOSIS — E53.8 BIOTIN DEFICIENCY DISEASE: ICD-10-CM

## 2021-06-09 DIAGNOSIS — G25.81 RESTLESS LEG SYNDROME, UNCONTROLLED: ICD-10-CM

## 2021-06-09 DIAGNOSIS — E53.8 B12 DEFICIENCY: ICD-10-CM

## 2021-06-09 DIAGNOSIS — G47.26 SHIFT WORK SLEEP DISORDER: ICD-10-CM

## 2021-06-09 DIAGNOSIS — G47.9 SLEEP DISTURBANCE: ICD-10-CM

## 2021-06-09 DIAGNOSIS — D72.829 LEUKOCYTOSIS, UNSPECIFIED TYPE: ICD-10-CM

## 2021-06-09 DIAGNOSIS — Z00.00 ROUTINE ADULT HEALTH MAINTENANCE: ICD-10-CM

## 2021-06-09 DIAGNOSIS — G89.29 CHRONIC LEFT-SIDED LOW BACK PAIN WITHOUT SCIATICA: ICD-10-CM

## 2021-06-09 DIAGNOSIS — F41.8 MIXED ANXIETY DEPRESSIVE DISORDER: ICD-10-CM

## 2021-06-09 DIAGNOSIS — R40.0 DAYTIME SOMNOLENCE: ICD-10-CM

## 2021-06-09 DIAGNOSIS — R60.0 PEDAL EDEMA: ICD-10-CM

## 2021-06-09 DIAGNOSIS — E78.2 MIXED HYPERLIPIDEMIA: Primary | ICD-10-CM

## 2021-06-09 DIAGNOSIS — R53.83 FATIGUE, UNSPECIFIED TYPE: ICD-10-CM

## 2021-06-09 PROCEDURE — 99214 OFFICE O/P EST MOD 30 MIN: CPT | Performed by: PHYSICIAN ASSISTANT

## 2021-06-18 LAB
25(OH)D3+25(OH)D2 SERPL-MCNC: 31 NG/ML (ref 30–100)
ALBUMIN SERPL-MCNC: 4 G/DL (ref 3.5–5.2)
ALBUMIN/GLOB SERPL: 1.5 G/DL
ALP SERPL-CCNC: 74 U/L (ref 39–117)
ALT SERPL-CCNC: 24 U/L (ref 1–33)
AST SERPL-CCNC: 11 U/L (ref 1–32)
BASOPHILS # BLD AUTO: 0.1 10*3/MM3 (ref 0–0.2)
BASOPHILS NFR BLD AUTO: 0.8 % (ref 0–1.5)
BILIRUB SERPL-MCNC: 0.3 MG/DL (ref 0–1.2)
BIOTIN SERPL-MCNC: <0.05 NG/ML (ref 0.05–0.83)
BUN SERPL-MCNC: 11 MG/DL (ref 6–20)
BUN/CREAT SERPL: 13.8 (ref 7–25)
CALCIUM SERPL-MCNC: 9.3 MG/DL (ref 8.6–10.5)
CHLORIDE SERPL-SCNC: 104 MMOL/L (ref 98–107)
CHOLEST SERPL-MCNC: 195 MG/DL (ref 0–200)
CK SERPL-CCNC: 65 U/L (ref 20–180)
CO2 SERPL-SCNC: 25.9 MMOL/L (ref 22–29)
CREAT SERPL-MCNC: 0.8 MG/DL (ref 0.57–1)
EOSINOPHIL # BLD AUTO: 0.19 10*3/MM3 (ref 0–0.4)
EOSINOPHIL NFR BLD AUTO: 1.5 % (ref 0.3–6.2)
ERYTHROCYTE [DISTWIDTH] IN BLOOD BY AUTOMATED COUNT: 13.7 % (ref 12.3–15.4)
FERRITIN SERPL-MCNC: 72.8 NG/ML (ref 13–150)
FOLATE SERPL-MCNC: 12.2 NG/ML (ref 4.78–24.2)
GLOBULIN SER CALC-MCNC: 2.7 GM/DL
GLUCOSE SERPL-MCNC: 106 MG/DL (ref 65–99)
HBA1C MFR BLD: 6 % (ref 4.8–5.6)
HCT VFR BLD AUTO: 41.6 % (ref 34–46.6)
HDLC SERPL-MCNC: 40 MG/DL (ref 40–60)
HGB BLD-MCNC: 13.8 G/DL (ref 12–15.9)
IMM GRANULOCYTES # BLD AUTO: 0.04 10*3/MM3 (ref 0–0.05)
IMM GRANULOCYTES NFR BLD AUTO: 0.3 % (ref 0–0.5)
IRON SATN MFR SERPL: 21 % (ref 20–50)
IRON SERPL-MCNC: 75 MCG/DL (ref 37–145)
LDLC SERPL CALC-MCNC: 116 MG/DL (ref 0–100)
LDLC/HDLC SERPL: 2.76 {RATIO}
LYMPHOCYTES # BLD AUTO: 4.61 10*3/MM3 (ref 0.7–3.1)
LYMPHOCYTES NFR BLD AUTO: 37.2 % (ref 19.6–45.3)
MCH RBC QN AUTO: 28.7 PG (ref 26.6–33)
MCHC RBC AUTO-ENTMCNC: 33.2 G/DL (ref 31.5–35.7)
MCV RBC AUTO: 86.5 FL (ref 79–97)
MONOCYTES # BLD AUTO: 0.97 10*3/MM3 (ref 0.1–0.9)
MONOCYTES NFR BLD AUTO: 7.8 % (ref 5–12)
NEUTROPHILS # BLD AUTO: 6.49 10*3/MM3 (ref 1.7–7)
NEUTROPHILS NFR BLD AUTO: 52.4 % (ref 42.7–76)
NRBC BLD AUTO-RTO: 0 /100 WBC (ref 0–0.2)
PLATELET # BLD AUTO: 365 10*3/MM3 (ref 140–450)
POTASSIUM SERPL-SCNC: 4.3 MMOL/L (ref 3.5–5.2)
PROT SERPL-MCNC: 6.7 G/DL (ref 6–8.5)
RBC # BLD AUTO: 4.81 10*6/MM3 (ref 3.77–5.28)
SODIUM SERPL-SCNC: 141 MMOL/L (ref 136–145)
TIBC SERPL-MCNC: 364 MCG/DL
TRIGL SERPL-MCNC: 224 MG/DL (ref 0–150)
UIBC SERPL-MCNC: 289 MCG/DL (ref 112–346)
VIT B12 SERPL-MCNC: 417 PG/ML (ref 211–946)
VLDLC SERPL CALC-MCNC: 39 MG/DL (ref 5–40)
WBC # BLD AUTO: 12.4 10*3/MM3 (ref 3.4–10.8)

## 2021-07-03 RX ORDER — TRAZODONE HYDROCHLORIDE 50 MG/1
50 TABLET ORAL NIGHTLY
Qty: 30 TABLET | Refills: 0 | Status: SHIPPED | OUTPATIENT
Start: 2021-07-03 | End: 2021-11-30

## 2021-08-27 ENCOUNTER — OFFICE VISIT (OUTPATIENT)
Dept: FAMILY MEDICINE CLINIC | Facility: CLINIC | Age: 51
End: 2021-08-27

## 2021-08-27 VITALS
DIASTOLIC BLOOD PRESSURE: 72 MMHG | BODY MASS INDEX: 36.32 KG/M2 | WEIGHT: 205 LBS | HEIGHT: 63 IN | RESPIRATION RATE: 16 BRPM | TEMPERATURE: 97.3 F | SYSTOLIC BLOOD PRESSURE: 130 MMHG | HEART RATE: 104 BPM | OXYGEN SATURATION: 97 %

## 2021-08-27 DIAGNOSIS — R60.0 PEDAL EDEMA: ICD-10-CM

## 2021-08-27 DIAGNOSIS — F41.8 MIXED ANXIETY DEPRESSIVE DISORDER: ICD-10-CM

## 2021-08-27 DIAGNOSIS — R03.0 ELEVATED BLOOD PRESSURE READING: Primary | ICD-10-CM

## 2021-08-27 DIAGNOSIS — R40.0 DAYTIME SOMNOLENCE: ICD-10-CM

## 2021-08-27 DIAGNOSIS — R07.89 CHEST PRESSURE: ICD-10-CM

## 2021-08-27 DIAGNOSIS — G47.9 SLEEP DISTURBANCE: ICD-10-CM

## 2021-08-27 DIAGNOSIS — R53.83 FATIGUE, UNSPECIFIED TYPE: ICD-10-CM

## 2021-08-27 DIAGNOSIS — R06.09 DYSPNEA ON EXERTION: ICD-10-CM

## 2021-08-27 PROCEDURE — 99214 OFFICE O/P EST MOD 30 MIN: CPT | Performed by: PHYSICIAN ASSISTANT

## 2021-08-27 PROCEDURE — 93000 ELECTROCARDIOGRAM COMPLETE: CPT | Performed by: PHYSICIAN ASSISTANT

## 2021-08-27 NOTE — PROGRESS NOTES
Subjective   Mary Banda is a 51 y.o. female who presents today in follow up of hyperlipidemia, prediabetes, vitamin D, leukocytosis, RLS, fatigue, edema, moods, allergic rhinitis, back and neck pain, foot pain, and specialists. She has had some sleep issues with changing shifts, having to change schedules when working and not working.    Hypertension  Associated symptoms include chest pain, neck pain, palpitations and shortness of breath.     Finished med pass and felt left chest discomfort and uneasy- 179/97. Took ASpirin. No radiation of check. She has palpitations but none more than normal. She has noticed SOA with exertion for the past couple days. She was sweating but was wearing N95 and PPE. No nausea, vomiting. She clocked out, took a nap, sat and charted, and decreased to 135/53. Had diet Pepsi- not abnormal. Not more stressed than normal and was done early. Has been more stressed with work. Now back to N95 and gowns and all patients on isolation. No other contributing factors. 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     Checked BP- this am 134/53, 155/81 after med pass. Does not usually check before and after med pass.   She has tested twice weekly for Covid at work.     Just changed to night shift- has made life easier- easier at night. Hard to change back and forth when off. Working 3 days per week. Trouble falling asleep and has to get up to urinate frequently. She reports not sleeping well on 1st shift. 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     Fatigue/snoring- tired during the day. If she sits down, she falls asleep. Snoring- loud but no reported apnea.     Edema-swelling has worsened. She would take in the past for puffy hands. Patient has Rx from previous PCP for HCTZ 12.5 mg daily. Taking 1-2 x weekly. Does not have some puffiness in hands. Notices the day after a salty. No SOA associated with swelling.     Moods- feels like she does not need to take anything at this  time. Doing ok.   she thinks she is feeling something. She has noticed that anxiety is causing SOA. Still having but not as often. Wants to stay on the same dose. Did not do well on 40 mg- sexual AE.   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 Now has mother in law living with her and work changes with Covid. Having increased anxiety and has noticed her BP was up.   · In the past, was on Prozac 20 mg daily- worked very well then wasn't enough and increased it and was too much- sexual AE. No mood AE with increased dosing. No SI/HI, concerns for safety. Lexapro- didn't help and may have made symptoms worse. Wellbutrin- didn't help- did nothing. No other medications.     Patient's Specialists:  Cardiology- Dr Horn- last appt 3/2019 for precordial chest pain, palpitations, abnormal CT chest, FHx tachycardia mediated cardiomyopathy, premature CAD, FHx cerebral aneurysm. Referred for CTA chest, echo, and holter monitor. CTA with patchy infiltrate- started on Levaquin and pulmonology follow up in 1 week. Negative Echo and stress test and holter with 1 episode of atrial tach- advised avoid stimulants, caffeine, alcohol, chocolate, and follow up in 6 months. Patient no show follow up.    General surgery- Dr Emerson- breast biopsy 8/2017  Neurosurgery- HOMERO Horn- last appt 5/2017 for low back pain with sciatica. 4/2017 MRI mild scoliosis centered at L3-4, mild degenerative changes, L5-S1 right sided neuroforaminal narrowing. Follow up PRN.   GYN- Dr Matthew Tamayo- last appt 11/2016 for well woman exam and galactorrhea. Normal exam, annual mammogram, labs. Follow up in 1 year. Follow up Dr Solo 7/29/2021.   Hematology- Dr Rose- last appt 3/1/2021 for chronic leukocytosis with intermittent neutrophilia, lymphocytosis, monocytosis, fatigue. Thought to be reactive and related to smoking.  Recommend sleep medicine evaluation. Needed updated  mammogram, pelvic exam, and colonoscopy as well as smoking cessation.   Sleep medicine- patient cancelled by automated reminder system.   Hand surgery- Dr Melo- last appt 1/2020 for arthritis hand and trigger finger. Injection and was to follow up in 6 weeks. Patient did not follow up.   Pulmonology- Dr López Pleitez- last appt 3/2019 for SOA, tobacco use, and infiltrate. Started on Breo despite PF not classic for asthma, advised smoking cessation, and consider CT if persistent infiltrate on follow up imaging.   Orthopedic surgery- Dr Snider- last appt 8/2018 for right shoulder pain- rotator cuff and biceps tendonitis. Injection, PT and follow up in 4 weeks. Did not return.     The following portions of the patient's history were reviewed and updated as appropriate: allergies, current medications, past family history, past medical history, past social history, past surgical history and problem list.    Review of Systems   Constitutional: Positive for fatigue.   HENT: Negative.    Eyes: Negative.    Respiratory: Positive for shortness of breath.    Cardiovascular: Positive for chest pain, palpitations and leg swelling.   Gastrointestinal: Negative.    Musculoskeletal: Positive for arthralgias, back pain, neck pain and neck stiffness.   Skin:        Subcutaneous nodule   Neurological: Negative.    Hematological: Negative.    Psychiatric/Behavioral: Negative.        Objective   Vitals:    08/27/21 1040   BP: 130/72   Pulse:    Resp:    Temp:    SpO2:      Body mass index is 36.31 kg/m².    Physical Exam  Vitals and nursing note reviewed.   Constitutional:       General: She is not in acute distress.     Appearance: Normal appearance. She is well-developed.   HENT:      Head: Normocephalic and atraumatic.      Right Ear: External ear normal.      Left Ear: External ear normal.      Nose: Nose normal.   Eyes:      General: Lids are normal.      Conjunctiva/sclera: Conjunctivae normal.   Neck:      Vascular: No  carotid bruit.   Cardiovascular:      Rate and Rhythm: Normal rate and regular rhythm.      Pulses: Normal pulses.      Heart sounds: Normal heart sounds. No murmur heard.   No friction rub. No gallop.    Pulmonary:      Effort: Pulmonary effort is normal. No respiratory distress.      Breath sounds: Normal breath sounds. No wheezing, rhonchi or rales.   Musculoskeletal:         General: No deformity.      Cervical back: Neck supple.   Skin:     General: Skin is warm and dry.   Neurological:      Mental Status: She is alert and oriented to person, place, and time.      Gait: Gait normal.   Psychiatric:         Speech: Speech normal.         Behavior: Behavior normal.         Thought Content: Thought content normal.         Judgment: Judgment normal.       ECG 12 Lead    Date/Time: 8/27/2021 10:50 AM  Performed by: Katie Felipe PA  Authorized by: Katie Felipe PA   Comparison: compared with previous ECG from 3/20/2019  Similar to previous ECG  Rhythm: sinus rhythm  Rate: normal  Q waves: II, III and aVF    ST Elevation: V1  ST Depression: aVF  T Waves: T waves normal  QRS axis: normal    Clinical impression: normal ECG  Comments: Indication:, CP, SOA, elevated BP          Assessment/Plan   Diagnoses and all orders for this visit:    1. Elevated blood pressure reading (Primary)  -     ECG 12 Lead    2. Chest pressure  -     ECG 12 Lead    3. Dyspnea on exertion  -     ECG 12 Lead    4. Fatigue, unspecified type  -     ECG 12 Lead    5. Daytime somnolence    6. Sleep disturbance    7. Pedal edema    8. Mixed anxiety depressive disorder        Assessment and plan    · Elevated blood pressure-Blood pressure is okay today.  She had normal readings then borderline reading.  She has not been taking her HCTZ daily.  I asked that she take HCTZ 12.5 mg once daily.  She should also monitor her heart rate, as pulse today was a little fast then normalized.  She should call if elevated blood pressure or heart rate and we  can consider low-dose beta-blocker.  Episodes could be related to underlying sleep apnea, however, she has not seen sleep medicine as referred.  She should schedule appointment.  · Left chest pressure and dyspnea on exertion-EKG today without acute changes.  I did asked that she call Dr. LABOY for follow-up.  I counseled the patient that if she has any recurrence of chest pressure or shortness of air or any other episodes, she should contact the cardiologist to see about being seen at the acute cardiac care center, as the ER is have little capacity at this time with COVID-19 pandemic.  · Fatigue- With snoring and fatigue, she should also see sleep medicine as referred.  Patient canceled appointment but should see them.  · Edema- I discussed with patient that PRN use of diuretics is not common and that edema is either a sign of underlying CHF or other chronic conditions or is related to lifestyle, diet, and exercise. I recommend compression stockings, decrease sugars, starches, and salts, increase water, and ensure proper exercise daily. I discussed the need to see sleep medicine.    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  She had improvement on Prozac 20 mg daily and previously failed higher doses due to side effects. She then weaned off and does not feel she needs medication at this time. Consider follow up if worsening moods or the need for medication.     Patient has seen specialists as noted above.  I have reviewed available records, including consult notes, labs, and imaging/testing.  Patient to follow-up with specialists as directed by them.    I spent 35 minutes caring for Mary Banda on this date of service. This time includes time spent by me in the following activities as necessary: preparing for the visit, reviewing tests, specialists records and previous visits, obtaining and/or  reviewing a separately obtained history, performing a medically appropriate exam and/or evaluation, counseling and educating the patient, family, caregiver, referring and/or communicating with other healthcare professionals, documenting information in the medical record, independently interpreting results and communicating that information with the patient, family, caregiver, and developing a medically appropriate treatment plan with consideration of other conditions, medications, and treatments.

## 2021-09-15 ENCOUNTER — OFFICE VISIT (OUTPATIENT)
Dept: FAMILY MEDICINE CLINIC | Facility: CLINIC | Age: 51
End: 2021-09-15

## 2021-09-15 VITALS
SYSTOLIC BLOOD PRESSURE: 114 MMHG | HEIGHT: 63 IN | OXYGEN SATURATION: 98 % | WEIGHT: 205 LBS | TEMPERATURE: 97.7 F | BODY MASS INDEX: 36.32 KG/M2 | RESPIRATION RATE: 18 BRPM | HEART RATE: 93 BPM | DIASTOLIC BLOOD PRESSURE: 62 MMHG

## 2021-09-15 DIAGNOSIS — L72.3 INFECTED SEBACEOUS CYST: Primary | ICD-10-CM

## 2021-09-15 DIAGNOSIS — L08.9 INFECTED SEBACEOUS CYST: Primary | ICD-10-CM

## 2021-09-15 DIAGNOSIS — N64.4 BREAST PAIN, RIGHT: ICD-10-CM

## 2021-09-15 PROCEDURE — 99213 OFFICE O/P EST LOW 20 MIN: CPT | Performed by: PHYSICIAN ASSISTANT

## 2021-09-15 RX ORDER — DOXYCYCLINE HYCLATE 100 MG/1
100 CAPSULE ORAL 2 TIMES DAILY
Qty: 20 CAPSULE | Refills: 0 | Status: SHIPPED | OUTPATIENT
Start: 2021-09-15 | End: 2021-10-15

## 2021-09-21 ENCOUNTER — OFFICE VISIT (OUTPATIENT)
Dept: SURGERY | Facility: CLINIC | Age: 51
End: 2021-09-21

## 2021-09-21 VITALS — WEIGHT: 206 LBS | HEIGHT: 63 IN | BODY MASS INDEX: 36.5 KG/M2

## 2021-09-21 DIAGNOSIS — L08.9 INFECTED SEBACEOUS CYST: Primary | ICD-10-CM

## 2021-09-21 DIAGNOSIS — L72.3 INFECTED SEBACEOUS CYST: Primary | ICD-10-CM

## 2021-09-21 PROCEDURE — 99242 OFF/OP CONSLTJ NEW/EST SF 20: CPT | Performed by: SURGERY

## 2021-09-27 ENCOUNTER — OFFICE VISIT (OUTPATIENT)
Dept: MAMMOGRAPHY | Facility: CLINIC | Age: 51
End: 2021-09-27

## 2021-09-27 VITALS
TEMPERATURE: 97.9 F | OXYGEN SATURATION: 99 % | SYSTOLIC BLOOD PRESSURE: 110 MMHG | DIASTOLIC BLOOD PRESSURE: 70 MMHG | HEIGHT: 63 IN | HEART RATE: 83 BPM | WEIGHT: 205.3 LBS | RESPIRATION RATE: 16 BRPM | BODY MASS INDEX: 36.38 KG/M2

## 2021-09-27 DIAGNOSIS — N64.4 BREAST PAIN, RIGHT: Primary | ICD-10-CM

## 2021-09-27 PROCEDURE — 99204 OFFICE O/P NEW MOD 45 MIN: CPT | Performed by: SURGERY

## 2021-09-27 NOTE — PROGRESS NOTES
Chief Complaint: Mary Banda is a 51 y.o.. female here today for Breast Pain (Patient has had right sided breast pain for about 2-3 weeks. Her PA told her to switch to sports bras, this has helped. )        History of Present Illness:  Patient presents with breast pain.  She is a nice 51-year-old white female that I saw several years ago.  She complains of some intermittent right breast pain which began about 5 weeks ago.  It appears to be located in the lower inner quadrant near the edge of the areola.  The pain is described as burning.  She began wearing a sports bra and has had improvement since doing that.  The patient has not palpated any masses or detected any nipple discharge.  Her last imaging was in April.  At that time a focal asymmetry was noted in the posterior one third in the lateral aspect of the right breast.  Subsequent diagnostic imaging and ultrasound revealed this to be a cyst.  I have personally reviewed those imaging studies and agree with the findings.    The patient denies any family history of breast cancer.      Review of Systems:  Review of Systems   Skin: Negative.         The patient denies any changes to the skin of the breasts.       I have reviewed the ROS as documented by the MA/LPN/RN Enrico Emerson MD      Past Medical and Surgical History:  Breast Biopsy History:  Patient has had the following breast biopsies:8/9/17  Breast Cancer HIstory:  Patient does not have a past medical history of breast cancer.  Breast Operations, and year:  Breast Jordan the R side 8/9/17  Social History     Tobacco Use   Smoking Status Current Every Day Smoker   • Packs/day: 0.50   • Years: 25.00   • Pack years: 12.50   • Types: Cigarettes   • Start date: 1991   Smokeless Tobacco Never Used   Tobacco Comment    Caffeine - 3-4 diet pepsi     Obstetric History:  Patient does not menstruate, due to an ablation in the following year:2004   Number of pregnancies:5  Number of live births: 2  Number of  abortions or miscarriages: 3  Age of delivery of first child: 23  Patient did not breast feed.  Length of time taking birth control pills:From age [age]  Patient has never taken hormone replacement    Past Surgical History:   Procedure Laterality Date   • BREAST BIOPSY Right 8/9/2017    Procedure: RIGHT BREAST BIOPSY WITH NEEDLE LOCALIZATION ;  Surgeon: Enrico Emerson MD;  Location: [facility];  Service:    • BREAST BIOPSY Right 03/2017    U/S guided bx- non-atypical ductal hyperplasia   • CHOLECYSTECTOMY  2002   • ENDOMETRIAL ABLATION W/ NOVASURE  2006   • OOPHORECTOMY Left 1994    Part of left ovary removed   • TUBAL ABDOMINAL LIGATION  1995       Past Medical History:   Diagnosis Date   • Abnormal CT of the chest 3/24/2019    Recheck CT chest 1/2020 with resolution of abnormality and no new abnormality   • Anxiety    • Arthritis    • Breast nodule     RIGHT   • DDD (degenerative disc disease), lumbar    • Gallstones    • H/O Anxiety    • Heart palpitations     AT TIMES   • IBS (irritable bowel syndrome)    • Low back pain    • PONV (postoperative nausea and vomiting)    • Seasonal allergies        Prior Hospitalizations, other than for surgery or childbirth, and year:  Pylonephritis 16 years ago    Social History:  Patient is .  Patient has has 1 daughter and 1 son.    Family History:  Family History   Problem Relation Age of Onset   • Thyroid disease Mother    • Heart disease Mother    • Depression Mother    • Hypothyroidism Mother    • Arthritis Mother    • Stroke Father    • Hyperlipidemia Father    • Heart disease Father    • Diabetes Father    • Heart attack Father    • Arthritis Father    • Lupus Sister    • Heart disease Maternal Grandmother    • Alcohol abuse Maternal Grandmother    • Lung cancer Maternal Grandmother    • Stroke Maternal Grandmother    • Heart disease Maternal Grandfather    • Heart attack Maternal Grandfather    • Heart disease Paternal Grandmother    • Lung cancer  Paternal Grandmother    • Aneurysm Sister    • Heart disease Sister    • Cerebral aneurysm Sister    • Depression Daughter    • Hypertension Daughter    • Hypothyroidism Daughter    • Diabetes Paternal Aunt    • Diabetes Paternal Uncle    • Breast cancer Neg Hx        Vital Signs:  Vitals:    09/27/21 0858   BP: 110/70   Pulse: 83   Resp: 16   Temp: 97.9 °F (36.6 °C)   SpO2: 99%       Medications:    Current Outpatient Prescriptions:     Current Outpatient Medications:   •  Biotin 1000 MCG tablet, Take 1,000 mcg by mouth Daily., Disp: , Rfl:   •  cholecalciferol (VITAMIN D3) 25 MCG (1000 UT) tablet, Take 1,000 Units by mouth Daily., Disp: , Rfl:   •  doxycycline (VIBRAMYCIN) 100 MG capsule, Take 1 capsule by mouth 2 (Two) Times a Day., Disp: 20 capsule, Rfl: 0  •  fluticasone (FLONASE) 50 MCG/ACT nasal spray, 2 sprays into the nostril(s) as directed by provider Daily., Disp: 16 g, Rfl: 3  •  hydroCHLOROthiazide (MICROZIDE) 12.5 MG capsule, Take 1 capsule by mouth Every Morning. (Patient taking differently: Take 12.5 mg by mouth 2 (Two) Times a Day.), Disp: 30 capsule, Rfl: 0  •  rOPINIRole (REQUIP) 1 MG tablet, TAKE 1 TABLET EVERY NIGHT TAKE ONE HOUR BEFORE BEDTIME, Disp: 30 tablet, Rfl: 0  •  vitamin B-12 (CYANOCOBALAMIN) 1000 MCG tablet, Take 1,000 mcg by mouth Daily., Disp: , Rfl:   •  cyclobenzaprine (FLEXERIL) 5 MG tablet, Take 1 tablet by mouth 3 (Three) Times a Day As Needed for Muscle Spasms., Disp: 90 tablet, Rfl: 1  •  traZODone (DESYREL) 50 MG tablet, Take 1 tablet by mouth Every Night., Disp: 30 tablet, Rfl: 0    Physical Examination:  General Appearance:   Patient is in no distress.  She is well kept and has an obese build.   Psychiatric:  Patient with appropriate mood and affect. Alert and oriented to self, time, and place.    Breast, RIGHT:  medium sized, symmetric with the contralateral side.  Breast skin is without erythema, edema, rashes.  There are no visible abnormalities upon inspection during  the arm-raising maneuver or with hands on hips in the sitting position. There is no nipple retraction, discharge or nipple/areolar skin changes.There are no masses palpable in the sitting or supine positions.    Breast, LEFT:  medium sized, symmetric with the contralateral side.  Breast skin is without erythema, edema, rashes.  There are no visible abnormalities upon inspection during the arm-raising maneuver or with hands on hips in the sitting position. There is no nipple retraction, discharge or nipple/areolar skin changes.There are no masses palpable in the sitting or supine positions.    Lymphatic:  There is no axillary, cervical, infraclavicular, or supraclavicular adenopathy bilaterally.    Gastrointestinal:  Abdomen is soft, nondistended, and nontender.  There was no obvious hepatosplenomegaly or abdominal mass.  There are no scars from previous surgery.    Musculoskeletal:  Good strength in all 4 extremities.   There is good range of motion in both shoulders.        Assessment:  1. Breast pain, right          Plan:  We had a nice discussion regarding breast pain.We discussed that the differential diagnosis of breast pain includes, but is not limited to: fibrocystic condition, macrocysts, fibroadenomas, breast cancer,  trauma to the breast or chest wall, inflammation or  other musculoskeletal disturbances of the chest wall.  There are no concerning findings on her examination today or on her most recent imaging for a focal cause of her pain- ie a mass, inflammation, or trauma.  Although her examination today is pretty normal, we certainly could consider repeating a right diagnostic mammogram and ultrasound.. The most likely etiology of her breast pain is fibrocystic condition, meaning a hormonal responsiveness of the breast tissue.  We discussed that this pain can be aggravated by many things, including stress, caffeine, and fluctuations in hormone levels.  We discussed the most common interventions to  alleviate her symptoms, including wearing a supportive bra, reducing caffeine intake, oral vitamin E 400 units qday or BID, or evening primrose oil 2000-3000mg orally daily.   The patient will continue wearing the sports bra for now.  She will also begin taking vitamin E and using the diclofenac gel from time to time.  She does not want to proceed with imaging studies just yet but will see how these interventions play out.  I told her I would like to see her back in 3 months.      CPT coding:    Next Appointment:  No follow-ups on file.            EMR Dragon/transcription disclaimer:    Much of this encounter note is an electronic transcription/translocation of spoken language to printed text.  The electronic translation of spoken language may permit erroneous, or at times, nonsensical words or phrases to be inadvertently transcribed.  Although I have reviewed the note from such areas, some may still exist.

## 2021-10-15 ENCOUNTER — OFFICE VISIT (OUTPATIENT)
Dept: OBSTETRICS AND GYNECOLOGY | Age: 51
End: 2021-10-15

## 2021-10-15 VITALS
WEIGHT: 207.8 LBS | HEIGHT: 63 IN | DIASTOLIC BLOOD PRESSURE: 78 MMHG | BODY MASS INDEX: 36.82 KG/M2 | SYSTOLIC BLOOD PRESSURE: 136 MMHG

## 2021-10-15 DIAGNOSIS — Z76.89 ENCOUNTER TO ESTABLISH CARE: ICD-10-CM

## 2021-10-15 DIAGNOSIS — L72.0 EPIDERMAL INCLUSION CYST: ICD-10-CM

## 2021-10-15 DIAGNOSIS — Z12.4 SCREENING FOR CERVICAL CANCER: ICD-10-CM

## 2021-10-15 DIAGNOSIS — Z01.419 WELL WOMAN EXAM WITH ROUTINE GYNECOLOGICAL EXAM: Primary | ICD-10-CM

## 2021-10-15 DIAGNOSIS — N95.1 MENOPAUSAL SYMPTOMS: ICD-10-CM

## 2021-10-15 PROCEDURE — 99213 OFFICE O/P EST LOW 20 MIN: CPT | Performed by: STUDENT IN AN ORGANIZED HEALTH CARE EDUCATION/TRAINING PROGRAM

## 2021-10-15 PROCEDURE — 99386 PREV VISIT NEW AGE 40-64: CPT | Performed by: STUDENT IN AN ORGANIZED HEALTH CARE EDUCATION/TRAINING PROGRAM

## 2021-10-19 ENCOUNTER — OFFICE VISIT (OUTPATIENT)
Dept: SURGERY | Facility: CLINIC | Age: 51
End: 2021-10-19

## 2021-10-19 VITALS — BODY MASS INDEX: 36.11 KG/M2 | HEIGHT: 63 IN | WEIGHT: 203.8 LBS

## 2021-10-19 DIAGNOSIS — L72.3 SEBACEOUS CYST: Primary | ICD-10-CM

## 2021-10-19 LAB
CYTOLOGIST CVX/VAG CYTO: NORMAL
CYTOLOGY CVX/VAG DOC CYTO: NORMAL
CYTOLOGY CVX/VAG DOC THIN PREP: NORMAL
HIV 1 & 2 AB SER-IMP: NORMAL
HPV I/H RISK 4 DNA CVX QL PROBE+SIG AMP: NEGATIVE
OTHER STN SPEC: NORMAL
STAT OF ADQ CVX/VAG CYTO-IMP: NORMAL

## 2021-10-19 PROCEDURE — 99212 OFFICE O/P EST SF 10 MIN: CPT | Performed by: SURGERY

## 2021-10-19 NOTE — PROGRESS NOTES
Subjective   Mary Banda is a 51 y.o. female who returns to the office in follow-up of an infected sebaceous cyst.    History of Present Illness     The patient had an infected sebaceous cyst of her upper back that was treated with doxycycline and then spontaneously ruptured and drained.  She was seen in the office about a month ago at which time there was an inflammatory process present but no undrained abscess.  She now returns in follow-up and feels much better.  She has not noticed any abnormalities in that area.    Review of Systems   Constitutional: Negative for fatigue and fever.   Respiratory: Negative for chest tightness and shortness of breath.    Cardiovascular: Negative for chest pain and palpitations.   Gastrointestinal: Negative for abdominal pain, blood in stool, constipation, diarrhea, nausea and vomiting.     Past Medical History:   Diagnosis Date   • Abnormal CT of the chest 3/24/2019    Recheck CT chest 1/2020 with resolution of abnormality and no new abnormality   • Anxiety    • Arthritis    • Breast nodule     RIGHT   • DDD (degenerative disc disease), lumbar    • Gallstones    • H/O Anxiety    • Heart palpitations     AT TIMES   • IBS (irritable bowel syndrome)    • Low back pain    • PONV (postoperative nausea and vomiting)    • Seasonal allergies      Past Surgical History:   Procedure Laterality Date   • BREAST BIOPSY Right 8/9/2017    Procedure: RIGHT BREAST BIOPSY WITH NEEDLE LOCALIZATION ;  Surgeon: Enrico Emerson MD;  Location: [facility];  Service:    • BREAST BIOPSY Right 03/2017    U/S guided bx- non-atypical ductal hyperplasia   • CHOLECYSTECTOMY  2002   • ENDOMETRIAL ABLATION W/ NOVASURE  2006   • OOPHORECTOMY Left 1994    Part of left ovary removed   • TUBAL ABDOMINAL LIGATION  1995     Family History   Problem Relation Age of Onset   • Thyroid disease Mother    • Heart disease Mother    • Depression Mother    • Hypothyroidism Mother    • Arthritis Mother    • Stroke  Father    • Hyperlipidemia Father    • Heart disease Father    • Diabetes Father    • Heart attack Father    • Arthritis Father    • Lupus Sister    • Heart disease Maternal Grandmother    • Alcohol abuse Maternal Grandmother    • Lung cancer Maternal Grandmother    • Stroke Maternal Grandmother    • Heart disease Maternal Grandfather    • Heart attack Maternal Grandfather    • Heart disease Paternal Grandmother    • Lung cancer Paternal Grandmother    • Aneurysm Sister    • Heart disease Sister    • Cerebral aneurysm Sister    • Depression Daughter    • Hypertension Daughter    • Hypothyroidism Daughter    • Diabetes Paternal Aunt    • Diabetes Paternal Uncle    • Breast cancer Neg Hx      Social History     Socioeconomic History   • Marital status:      Spouse name: Ty   • Years of education: College   Tobacco Use   • Smoking status: Current Every Day Smoker     Packs/day: 0.50     Years: 25.00     Pack years: 12.50     Types: Cigarettes     Start date: 1991   • Smokeless tobacco: Never Used   • Tobacco comment: Caffeine - 3-4 diet pepsi   Vaping Use   • Vaping Use: Never used   Substance and Sexual Activity   • Alcohol use: Not Currently     Comment: social   • Drug use: No   • Sexual activity: Yes     Partners: Male     Birth control/protection: Surgical     Comment: Tubal ligation       Objective   Physical Exam  Constitutional:       Appearance: Normal appearance. She is well-developed. She is not toxic-appearing.   Eyes:      General: No scleral icterus.  Pulmonary:      Effort: Pulmonary effort is normal. No respiratory distress.   Skin:     General: Skin is warm and dry.      Comments: There is a well-healed area where the infectious sebaceous cyst was located.  There is no residual cyst palpable.   Neurological:      Mental Status: She is alert and oriented to person, place, and time.   Psychiatric:         Behavior: Behavior normal.         Thought Content: Thought content normal.          Judgment: Judgment normal.         Assessment/Plan       The encounter diagnosis was Sebaceous cyst.    The patient had an infected sebaceous cyst that is completely recovered.  There is no need for excision of the cyst at this time.  If the underlying cyst recurs, she will return to the office to consider an excision of the cyst.

## 2021-10-20 NOTE — PROGRESS NOTES
Please call patient to inform her Pap smear is normal and HPV negative. I recommend repeating in 5 years.  Thank you!    Sherrie Rios MD  9/29/2021  10:14 EDT

## 2021-11-19 ENCOUNTER — PATIENT ROUNDING (BHMG ONLY) (OUTPATIENT)
Dept: OBSTETRICS AND GYNECOLOGY | Age: 51
End: 2021-11-19

## 2021-11-19 NOTE — PROGRESS NOTES
November 19, 2021    Hello, may I speak with Mary Banda?    My name is Eunice Gonsalves     I am  with [facility]  [address]  [facility] [address] [phone].    Before we get started may I verify your date of birth? 1970    I am calling to officially welcome you to our practice and ask about your recent visit. Is this a good time to talk? No Voicemail left     Tell me about your visit with us. What things went well?         We're always looking for ways to make our patients' experiences even better. Do you have recommendations on ways we may improve?      Overall were you satisfied with your first visit to our practice?        I appreciate you taking the time to speak with me today. Is there anything else I can do for you?       Thank you, and have a great day.

## 2021-11-30 ENCOUNTER — OFFICE VISIT (OUTPATIENT)
Dept: CARDIOLOGY | Facility: CLINIC | Age: 51
End: 2021-11-30

## 2021-11-30 VITALS
HEART RATE: 79 BPM | BODY MASS INDEX: 35.75 KG/M2 | WEIGHT: 201.8 LBS | DIASTOLIC BLOOD PRESSURE: 80 MMHG | SYSTOLIC BLOOD PRESSURE: 130 MMHG | HEIGHT: 63 IN

## 2021-11-30 DIAGNOSIS — R07.2 PRECORDIAL PAIN: Primary | ICD-10-CM

## 2021-11-30 DIAGNOSIS — R73.09 ELEVATED GLUCOSE: ICD-10-CM

## 2021-11-30 DIAGNOSIS — E78.5 HYPERLIPIDEMIA LDL GOAL <100: ICD-10-CM

## 2021-11-30 DIAGNOSIS — R00.2 PALPITATIONS: ICD-10-CM

## 2021-11-30 PROCEDURE — 99214 OFFICE O/P EST MOD 30 MIN: CPT | Performed by: INTERNAL MEDICINE

## 2021-11-30 PROCEDURE — 93000 ELECTROCARDIOGRAM COMPLETE: CPT | Performed by: INTERNAL MEDICINE

## 2022-03-29 ENCOUNTER — TELEPHONE (OUTPATIENT)
Dept: FAMILY MEDICINE CLINIC | Facility: CLINIC | Age: 52
End: 2022-03-29

## 2022-03-29 ENCOUNTER — OFFICE VISIT (OUTPATIENT)
Dept: FAMILY MEDICINE CLINIC | Facility: CLINIC | Age: 52
End: 2022-03-29

## 2022-03-29 VITALS
WEIGHT: 202 LBS | HEART RATE: 63 BPM | BODY MASS INDEX: 35.79 KG/M2 | RESPIRATION RATE: 20 BRPM | TEMPERATURE: 98 F | HEIGHT: 63 IN | DIASTOLIC BLOOD PRESSURE: 76 MMHG | SYSTOLIC BLOOD PRESSURE: 118 MMHG | OXYGEN SATURATION: 100 %

## 2022-03-29 DIAGNOSIS — R63.8 UNABLE TO LOSE WEIGHT: Primary | ICD-10-CM

## 2022-03-29 DIAGNOSIS — F41.8 MIXED ANXIETY DEPRESSIVE DISORDER: ICD-10-CM

## 2022-03-29 DIAGNOSIS — E78.2 MIXED HYPERLIPIDEMIA: ICD-10-CM

## 2022-03-29 DIAGNOSIS — M79.671 BILATERAL FOOT PAIN: ICD-10-CM

## 2022-03-29 DIAGNOSIS — D72.820 LYMPHOCYTOSIS: ICD-10-CM

## 2022-03-29 DIAGNOSIS — G25.81 RESTLESS LEG SYNDROME, UNCONTROLLED: ICD-10-CM

## 2022-03-29 DIAGNOSIS — G47.26 SHIFT WORK SLEEP DISORDER: ICD-10-CM

## 2022-03-29 DIAGNOSIS — R53.83 FATIGUE, UNSPECIFIED TYPE: ICD-10-CM

## 2022-03-29 DIAGNOSIS — E61.1 IRON DEFICIENCY: ICD-10-CM

## 2022-03-29 DIAGNOSIS — D72.829 LEUKOCYTOSIS, UNSPECIFIED TYPE: ICD-10-CM

## 2022-03-29 DIAGNOSIS — R03.0 ELEVATED BLOOD PRESSURE READING: ICD-10-CM

## 2022-03-29 DIAGNOSIS — G89.29 CHRONIC LEFT-SIDED LOW BACK PAIN WITHOUT SCIATICA: ICD-10-CM

## 2022-03-29 DIAGNOSIS — R60.0 PEDAL EDEMA: ICD-10-CM

## 2022-03-29 DIAGNOSIS — G47.9 SLEEP DISTURBANCE: ICD-10-CM

## 2022-03-29 DIAGNOSIS — E53.8 BIOTIN DEFICIENCY DISEASE: ICD-10-CM

## 2022-03-29 DIAGNOSIS — R40.0 DAYTIME SOMNOLENCE: ICD-10-CM

## 2022-03-29 DIAGNOSIS — M79.672 BILATERAL FOOT PAIN: ICD-10-CM

## 2022-03-29 DIAGNOSIS — E53.8 B12 DEFICIENCY: ICD-10-CM

## 2022-03-29 DIAGNOSIS — R73.03 PREDIABETES: ICD-10-CM

## 2022-03-29 DIAGNOSIS — E55.9 VITAMIN D DEFICIENCY: ICD-10-CM

## 2022-03-29 DIAGNOSIS — Z12.11 SCREENING FOR COLON CANCER: Primary | ICD-10-CM

## 2022-03-29 DIAGNOSIS — Z12.11 SCREEN FOR COLON CANCER: ICD-10-CM

## 2022-03-29 DIAGNOSIS — R07.89 CHEST PRESSURE: ICD-10-CM

## 2022-03-29 DIAGNOSIS — J30.9 ALLERGIC RHINITIS, UNSPECIFIED SEASONALITY, UNSPECIFIED TRIGGER: ICD-10-CM

## 2022-03-29 DIAGNOSIS — R06.09 DYSPNEA ON EXERTION: ICD-10-CM

## 2022-03-29 DIAGNOSIS — M54.50 CHRONIC LEFT-SIDED LOW BACK PAIN WITHOUT SCIATICA: ICD-10-CM

## 2022-03-29 PROCEDURE — 99214 OFFICE O/P EST MOD 30 MIN: CPT | Performed by: PHYSICIAN ASSISTANT

## 2022-04-29 ENCOUNTER — TRANSCRIBE ORDERS (OUTPATIENT)
Dept: ADMINISTRATIVE | Facility: HOSPITAL | Age: 52
End: 2022-04-29

## 2022-04-29 DIAGNOSIS — Z12.39 SCREENING BREAST EXAMINATION: Primary | ICD-10-CM

## 2022-05-03 ENCOUNTER — HOSPITAL ENCOUNTER (OUTPATIENT)
Dept: MAMMOGRAPHY | Facility: HOSPITAL | Age: 52
Discharge: HOME | End: 2022-05-03
Admitting: PHYSICIAN ASSISTANT

## 2022-05-03 DIAGNOSIS — Z12.39 SCREENING BREAST EXAMINATION: ICD-10-CM

## 2022-05-03 PROCEDURE — 77063 BREAST TOMOSYNTHESIS BI: CPT

## 2022-05-03 PROCEDURE — 77067 SCR MAMMO BI INCL CAD: CPT

## 2022-05-05 ENCOUNTER — TELEPHONE (OUTPATIENT)
Dept: FAMILY MEDICINE CLINIC | Facility: CLINIC | Age: 52
End: 2022-05-05

## 2022-05-06 ENCOUNTER — TELEPHONE (OUTPATIENT)
Dept: FAMILY MEDICINE CLINIC | Facility: CLINIC | Age: 52
End: 2022-05-06

## 2022-05-06 DIAGNOSIS — R92.8 ABNORMAL MAMMOGRAM OF RIGHT BREAST: Primary | ICD-10-CM

## 2022-05-06 NOTE — TELEPHONE ENCOUNTER
----- Message from Yomaira Street MA sent at 5/5/2022 11:50 AM EDT -----  Regarding: FW: Order for cc and us      ----- Message -----  From: Mary Banda  Sent: 5/5/2022  10:16 AM EDT  To: [facility]  Subject: Order for cc and us                              Will your office automatically send over an order to [facility] to do the spot compression and ultrasound?

## 2022-05-10 ENCOUNTER — TELEPHONE (OUTPATIENT)
Dept: FAMILY MEDICINE CLINIC | Facility: CLINIC | Age: 52
End: 2022-05-10

## 2022-05-10 NOTE — TELEPHONE ENCOUNTER
I was unsure if you had any ideaa on this. I am unsure if she would need to keep these appointments or if she could have them done somewhere else.

## 2022-05-10 NOTE — TELEPHONE ENCOUNTER
Caller: Mary Banda    Relationship: Self    Best call back number: [phone]    What orders are you requesting (i.e. lab or imaging): DIAGNOSTIC MAMMOGRAM AND ULTRASOUND     In what timeframe would the patient need to come in:ASAP     Where will you receive your lab/imaging services:     Additional notes: PATIENT WAS NOTIFIED TODAY THAT HER APPT FOR THESE TESTS HAS BEEN MOVED OUT TO 6-2-22.   PATIENT ASKING IF THERE IS SOMEWHERE ELSE SHE CAN HAVE TESTS DONE SOONER.   PLEASE ADVISE

## 2022-05-10 NOTE — TELEPHONE ENCOUNTER
Please see if there is any way to have this performed at another facility to move the appointment up

## 2022-05-11 NOTE — TELEPHONE ENCOUNTER
Please apologize.  I have checked to see if we could move this appointment sooner.  They advised this was the soonest appointment available and that she is on a wait list in case there are any cancellations.

## 2022-05-11 NOTE — TELEPHONE ENCOUNTER
I spoke to patient on the phone and she understood that they did not have any sooner appointments than June 2nd and that if they had any cancellations that they would give her a call.

## 2022-05-17 ENCOUNTER — APPOINTMENT (OUTPATIENT)
Dept: ULTRASOUND IMAGING | Facility: HOSPITAL | Age: 52
End: 2022-05-17

## 2022-05-17 ENCOUNTER — HOSPITAL ENCOUNTER (OUTPATIENT)
Dept: MAMMOGRAPHY | Facility: HOSPITAL | Age: 52
Discharge: HOME | End: 2022-05-17

## 2022-06-02 ENCOUNTER — HOSPITAL ENCOUNTER (OUTPATIENT)
Dept: ULTRASOUND IMAGING | Facility: HOSPITAL | Age: 52
Discharge: HOME | End: 2022-06-02

## 2022-06-02 ENCOUNTER — HOSPITAL ENCOUNTER (OUTPATIENT)
Dept: MAMMOGRAPHY | Facility: HOSPITAL | Age: 52
Discharge: HOME | End: 2022-06-02

## 2022-06-02 DIAGNOSIS — R92.8 ABNORMAL MAMMOGRAM OF RIGHT BREAST: ICD-10-CM

## 2022-06-02 PROCEDURE — 77065 DX MAMMO INCL CAD UNI: CPT

## 2022-06-02 PROCEDURE — G0279 TOMOSYNTHESIS, MAMMO: HCPCS

## 2022-06-02 PROCEDURE — 76642 ULTRASOUND BREAST LIMITED: CPT

## 2022-06-06 NOTE — TELEPHONE ENCOUNTER
Caller: Mary Banda    Relationship: Self    Best call back number: [phone]    What was the call regarding: PATIENT RECEIVED MYCHART RESULTS OF MAMMOGRAM AND IT STATES SHE IS NEEDING ADDITIONAL TESTING. SHE IS CALLING TO SEE WHEN THE ORDERS WILL BE PLACED SO SHE CAN HAVE SCHEDULED.     Do you require a callback: YES   ambulatory

## 2023-01-25 ENCOUNTER — OFFICE VISIT (OUTPATIENT)
Dept: FAMILY MEDICINE CLINIC | Facility: CLINIC | Age: 53
End: 2023-01-25
Payer: COMMERCIAL

## 2023-01-25 VITALS
RESPIRATION RATE: 16 BRPM | DIASTOLIC BLOOD PRESSURE: 70 MMHG | SYSTOLIC BLOOD PRESSURE: 122 MMHG | HEART RATE: 68 BPM | HEIGHT: 63 IN | OXYGEN SATURATION: 99 % | WEIGHT: 202 LBS | BODY MASS INDEX: 35.79 KG/M2 | TEMPERATURE: 97.7 F

## 2023-01-25 DIAGNOSIS — M79.671 BILATERAL FOOT PAIN: ICD-10-CM

## 2023-01-25 DIAGNOSIS — E53.8 BIOTIN DEFICIENCY DISEASE: ICD-10-CM

## 2023-01-25 DIAGNOSIS — E53.8 B12 DEFICIENCY: ICD-10-CM

## 2023-01-25 DIAGNOSIS — R40.0 DAYTIME SOMNOLENCE: ICD-10-CM

## 2023-01-25 DIAGNOSIS — M79.672 BILATERAL FOOT PAIN: ICD-10-CM

## 2023-01-25 DIAGNOSIS — E78.2 MIXED HYPERLIPIDEMIA: ICD-10-CM

## 2023-01-25 DIAGNOSIS — G89.29 CHRONIC LEFT-SIDED LOW BACK PAIN WITHOUT SCIATICA: ICD-10-CM

## 2023-01-25 DIAGNOSIS — D72.820 LYMPHOCYTOSIS: ICD-10-CM

## 2023-01-25 DIAGNOSIS — R53.83 FATIGUE, UNSPECIFIED TYPE: ICD-10-CM

## 2023-01-25 DIAGNOSIS — R73.03 PREDIABETES: ICD-10-CM

## 2023-01-25 DIAGNOSIS — G47.26 SHIFT WORK SLEEP DISORDER: ICD-10-CM

## 2023-01-25 DIAGNOSIS — R60.0 PEDAL EDEMA: ICD-10-CM

## 2023-01-25 DIAGNOSIS — R03.0 ELEVATED BLOOD PRESSURE READING: Primary | ICD-10-CM

## 2023-01-25 DIAGNOSIS — E61.1 IRON DEFICIENCY: ICD-10-CM

## 2023-01-25 DIAGNOSIS — E55.9 VITAMIN D DEFICIENCY: ICD-10-CM

## 2023-01-25 DIAGNOSIS — J30.9 ALLERGIC RHINITIS, UNSPECIFIED SEASONALITY, UNSPECIFIED TRIGGER: ICD-10-CM

## 2023-01-25 DIAGNOSIS — M54.50 CHRONIC LEFT-SIDED LOW BACK PAIN WITHOUT SCIATICA: ICD-10-CM

## 2023-01-25 DIAGNOSIS — G25.81 RESTLESS LEG SYNDROME, UNCONTROLLED: ICD-10-CM

## 2023-01-25 DIAGNOSIS — F41.8 MIXED ANXIETY DEPRESSIVE DISORDER: ICD-10-CM

## 2023-01-25 DIAGNOSIS — G47.9 SLEEP DISTURBANCE: ICD-10-CM

## 2023-01-25 DIAGNOSIS — D72.829 LEUKOCYTOSIS, UNSPECIFIED TYPE: ICD-10-CM

## 2023-01-25 RX ORDER — IBUPROFEN 400 MG/1
400 TABLET ORAL EVERY 6 HOURS PRN
COMMUNITY

## 2023-04-18 ENCOUNTER — OFFICE VISIT (OUTPATIENT)
Dept: FAMILY MEDICINE CLINIC | Facility: CLINIC | Age: 53
End: 2023-04-18
Payer: COMMERCIAL

## 2023-04-18 VITALS
HEART RATE: 86 BPM | HEIGHT: 63 IN | SYSTOLIC BLOOD PRESSURE: 112 MMHG | OXYGEN SATURATION: 99 % | RESPIRATION RATE: 18 BRPM | DIASTOLIC BLOOD PRESSURE: 64 MMHG | WEIGHT: 206.8 LBS | BODY MASS INDEX: 36.64 KG/M2 | TEMPERATURE: 98.9 F

## 2023-04-18 DIAGNOSIS — M25.561 CHRONIC PAIN OF BOTH KNEES: ICD-10-CM

## 2023-04-18 DIAGNOSIS — M54.50 CHRONIC LEFT-SIDED LOW BACK PAIN WITHOUT SCIATICA: ICD-10-CM

## 2023-04-18 DIAGNOSIS — R73.03 PREDIABETES: ICD-10-CM

## 2023-04-18 DIAGNOSIS — R53.83 FATIGUE, UNSPECIFIED TYPE: ICD-10-CM

## 2023-04-18 DIAGNOSIS — Z11.59 ENCOUNTER FOR HEPATITIS C SCREENING TEST FOR LOW RISK PATIENT: ICD-10-CM

## 2023-04-18 DIAGNOSIS — F41.8 MIXED ANXIETY DEPRESSIVE DISORDER: ICD-10-CM

## 2023-04-18 DIAGNOSIS — E55.9 VITAMIN D DEFICIENCY: ICD-10-CM

## 2023-04-18 DIAGNOSIS — R60.0 PEDAL EDEMA: ICD-10-CM

## 2023-04-18 DIAGNOSIS — R03.0 ELEVATED BLOOD PRESSURE READING: Primary | ICD-10-CM

## 2023-04-18 DIAGNOSIS — D72.820 LYMPHOCYTOSIS: ICD-10-CM

## 2023-04-18 DIAGNOSIS — R40.0 DAYTIME SOMNOLENCE: ICD-10-CM

## 2023-04-18 DIAGNOSIS — E78.2 MIXED HYPERLIPIDEMIA: ICD-10-CM

## 2023-04-18 DIAGNOSIS — G47.9 SLEEP DISTURBANCE: ICD-10-CM

## 2023-04-18 DIAGNOSIS — M25.522 LEFT ELBOW PAIN: ICD-10-CM

## 2023-04-18 DIAGNOSIS — G47.26 SHIFT WORK SLEEP DISORDER: ICD-10-CM

## 2023-04-18 DIAGNOSIS — M25.50 MULTIPLE JOINT PAIN: ICD-10-CM

## 2023-04-18 DIAGNOSIS — G47.9 DISTURBANCE IN SLEEP BEHAVIOR: ICD-10-CM

## 2023-04-18 DIAGNOSIS — M25.562 CHRONIC PAIN OF BOTH KNEES: ICD-10-CM

## 2023-04-18 DIAGNOSIS — G89.29 CHRONIC LEFT-SIDED LOW BACK PAIN WITHOUT SCIATICA: ICD-10-CM

## 2023-04-18 DIAGNOSIS — M79.672 BILATERAL FOOT PAIN: ICD-10-CM

## 2023-04-18 DIAGNOSIS — G25.81 RESTLESS LEG SYNDROME, UNCONTROLLED: ICD-10-CM

## 2023-04-18 DIAGNOSIS — E61.1 IRON DEFICIENCY: ICD-10-CM

## 2023-04-18 DIAGNOSIS — E53.8 B12 DEFICIENCY: ICD-10-CM

## 2023-04-18 DIAGNOSIS — E53.8 BIOTIN DEFICIENCY DISEASE: ICD-10-CM

## 2023-04-18 DIAGNOSIS — J30.9 ALLERGIC RHINITIS, UNSPECIFIED SEASONALITY, UNSPECIFIED TRIGGER: ICD-10-CM

## 2023-04-18 DIAGNOSIS — M79.671 BILATERAL FOOT PAIN: ICD-10-CM

## 2023-04-18 DIAGNOSIS — D72.829 LEUKOCYTOSIS, UNSPECIFIED TYPE: ICD-10-CM

## 2023-04-18 DIAGNOSIS — R20.0 NUMBNESS AND TINGLING IN RIGHT HAND: ICD-10-CM

## 2023-04-18 DIAGNOSIS — G89.29 CHRONIC PAIN OF BOTH KNEES: ICD-10-CM

## 2023-04-18 DIAGNOSIS — R20.2 NUMBNESS AND TINGLING IN RIGHT HAND: ICD-10-CM

## 2023-04-18 NOTE — PROGRESS NOTES
"Subjective   Mary Banda is a 52 y.o. female who presents today in follow up of blood pressure, hyperlipidemia, prediabetes, vitamin D deficiency, B12 deficiency, biotin deficiency, iron deficiency, leukocytosis, RLS, fatigue, edema, moods, allergic rhinitis, back and neck pain, foot pain, and specialists. She is also needing colon cancer screening.     Weight Loss  Associated symptoms include arthralgias, chest pain, fatigue and neck pain.   Hypertension  Associated symptoms include chest pain, neck pain, palpitations and shortness of breath.   Answers for HPI/ROS submitted by the patient on 4/17/2023  Please describe your symptoms.: I would like to start back on Prozac, I'm having a lot of joint pain, cannot sleep, and overdue on labs.  Have you had these symptoms before?: Yes  How long have you been having these symptoms?: Greater than 2 weeks  Please list any medications you are currently taking for this condition.: PRN IBU  Please describe any probable cause for these symptoms. : Age.  Weight.  Labs?  What is the primary reason for your visit?: Other      Colon cancer screening- negative Cologuard 6/5/023.   PGUncle with Colon cancer. Sister with precancerous polyps. She has Lupus. Parents had normal colonoscopy.     Changed to water, changed snacks. Not eating that much.     She reports she is eating in her sleep. She reports she wok up and had a package of Oreos. Waking up every 2-3 hours.   Did not see sleep medicine as referred.     Elevated BP- rpeorts her BP has been \"decent\"130s/80s.   8/2021- Finished med pass and felt left chest discomfort and uneasy- 179/97. Took Aspirin. No radiation of check. She has palpitations but none more than normal. She noticed SOA with exertion. She was sweating but was wearing N95 and PPE. No nausea, vomiting. She clocked out, took a nap, sat and charted, and decreased to 135/53. She had diet Pepsi- not abnormal. Not more stressed than normal and was done early- maybe more " stressed with work. Now back to [employer] and gowns and all patients on isolation. No other contributing factors. 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   Checked BP- 134/53, 155/81 after med pass. Does not usually check before and after med pass.   She has tested twice weekly for Covid at work.     Mixed hyperlipidemia- patient is on no medication. Has not been diligent with diet or exercise. Decreasing sodas and increasing water. Patient did not follow up 3 months from 6/2021 as directed.   Prediabetes- A1C has been elevated for a couple years- up to 6%. No medications. She did not follow up 3 months from 6/2021  Vitamin D deficiency- no longer taking vitamin D  taking vitamin D- not sure the dose  B12 deficiency- no longer taking B12  taking B12 not sure the dose. Was advised to take 500 mcg daily and once monthly B12 injection 6/2021. She did not follow up in 3 months as directed.   Biotin deficiency- no longer taking.   per labs 2/2021, to take Biotin supplement- taking but not sure the amount.   Iron deficiency- low iron saturation but iron was ok. Referred to hematology 2/2021. Not taking iron.   Leukocytosis- no signs of infection or malignancy, recent unexpected weight loss. She continues to smoke- Rx Chantix 12/2020. She was referred to hematology 2/2021.     RLS- does not take all the time but when she takes, she sleeps more. Patient reports the Requip makes tired.   · Has not had sleep study.   · Dx by Roberta.   · Requip 1 mg once daily- works for symptoms- not taking every night. The 1 mg tablet makes her too sleepy, so she only takes if her legs are bothersome.      Fatigue/snoring- tired during the day. If she sits down, she falls asleep. Snoring- loud but no reported apnea.   · She changed to night shift- made life easier- easier at night. Hard to change back and forth when off. Working 3 days per week. Trouble falling asleep and had to get up to urinate frequently. She was  not sleeping well on 1st shift. 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     Edema-swelling continues.    · She would take in the past for puffy hands. Patient has Rx from previous PCP for HCTZ 12.5 mg daily. Taking 1-2 x weekly. Does not have some puffiness in hands. Notices the day after a salty. No SOA associated with swelling.     Moods- she has had increased stress and has been very busy- she would like to restart Prozac. 20 mg worked well and 40 mg caused sexual dysfunction  3/2022- She felt she did not need to take anything and was doing ok.   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 Now has mother in law living with her and work changes with Covid. Having increased anxiety and has noticed her BP was up.   · In the past, was on Prozac 20 mg daily- worked very well then wasn't enough and increased it and was too much- sexual AE. No mood AE with increased dosing. No SI/HI, concerns for safety.   · She previously felt something and noticed anxiety was causing SOA. She continued with symptoms but not as often. She wanted to stay on the same dose of Prozac. Did not do well on 40 mg- sexual AE.  · Previous medications- Lexapro- didn't help and may have made symptoms worse. Wellbutrin- didn't help- did nothing. No other medications. Prozac- stopped working but sexual AE on 40 mg.     Allergic rhinitis- previously Flonase as needed. Not taking now.     Previously orthopedist in the past. Mother with RA, sister with Lupus, RA, and Raynaud's.   Patient's GYN- Dr Rios- 2021 and was to follow up in 1 month. She has had increased breast itching. Daughter and niece positive for BRCA gene. She had a lump in her breast and they are sending to a specialist. She cannot get much information from her daughter.   Left elbow pain x 4 months. She reports she has had left elbow pain with turning her arm over, can feel pain. She has compression sleeve that she  wears at times that helps. No tendonitis brace.   Right hand numbness- when holds right arm up, she has numbness with cross stitch.   Bilateral foot pain, bilateral knee pain.   Low back pain with sciatica, cervical DDD- worse at certain times. If lays flat, back hurts. Neck has not been hurting. The only time can sit and work on something is cross stitch.   Flexeril- takes as needed- has not taken in months.   · She reported she has a knot on her back for several months. No changes, pain, or itching. Small.   Foot pain- mostly left foot pain. She has sharp pain in medial and lateral aspect of her foot. They advised neuroma with previous podiatry. She was in a boot for a while. Has not returned to podiatry. When flexes toes, under left 1st MCP joint- has pain. If not wearing tennis shoes, she has increased pain in feet.   Patient complained of bilateral foot pain x > 1 year. When she was on her feet all day, waking up in the morning, and after sitting down in the morning then getting back up, she has lateral foot pain. She tried padded toe separators for possible bunion but no inserts. She thought they helped when she wore them but did not tolerate well.     Patient's Specialists:  [facility] surgery- Dr Emerson- last appt 9/2021 for right breast pain. Advised no concerning exam or previous mammogram. Advised diagnostic right mammogram and US. Thought to be fibrocystic condition. Discussed decreasing caffeine, oral vit E 400 units daily or BID, and evening primrose oil 2567-1054 mg orally. Continue sports bra and ising diclofenac gel. Follow up 3 months. Patient cancelled follow up 12/2021 and did  Not reschedule.   Cardiology- Dr Horn- last appt 11/2021 for precordial CP, palpitations, elevated glucose, and hyperlipidemia. Advised stress echo, low salt diet and regular exercise once stress test completed, recommended low cholesterol, low carb diet, increase omega 3, decrease excessive caffeine intake and 2 week Zio  patch if persistent symptoms, consider treatment prediabetes. Follow up APRN 6 months and MD in 1 year. Patient no show 2 stress echo appts. Follow up scheduled 6/2022.   · Prior 3/2019 for precordial chest pain, palpitations, abnormal CT chest, FHx tachycardia mediated cardiomyopathy, premature CAD, FHx cerebral aneurysm. Referred for CTA chest, echo, and holter monitor. CTA with patchy infiltrate- started on Levaquin and pulmonology follow up in 1 week.   · Negative Echo and stress test and holter with 1 episode of atrial tach- advised avoid stimulants, caffeine, alcohol, chocolate. Advised follow up in 6 months. Patient no show follow up.    General surgery- Dr Espinoza Fitzpatrick- last appt 10/2021 for sebaceous cyst. Recovered with antibiotics. No need for excision. Return if cyst recurs for consideration of excision.   Neurosurgery- Dipti Inman, APRGENIE- last appt 5/2017 for low back pain with sciatica. 4/2017 MRI mild scoliosis centered at L3-4, mild degenerative changes, L5-S1 right sided neuroforaminal narrowing. Follow up PRN.   GYN- Dr Loretta Briceño- last appt 10/2021 for well woman exam with PAP, mammogram up to date, epidermal inclusion cyst labia minora, menopausal symptoms. Consider I&D but no issues. Discussed FSH and menopause- declined FSH at that time. Follow up 1 year.   Prior- Dr Matthew Tamayo- 11/2016 for well woman exam and galactorrhea. Normal exam, annual mammogram, labs. Follow up in 1 year. Follow up Dr Solo 7/29/2021.   Hematology- Dr Rose- last appt 3/1/2021 for chronic leukocytosis with intermittent neutrophilia, lymphocytosis, monocytosis, fatigue. Thought to be reactive and related to smoking.  Recommend sleep medicine evaluation. Needed updated mammogram, pelvic exam, and colonoscopy as well as smoking cessation.   Sleep medicine- referred with appt 4/2021. Patient cancelled by automated reminder system and did not reschedule.   Hand surgery- Dr Melo- last appt 1/2020 for arthritis  hand and trigger finger. Injection and was to follow up in 6 weeks. Patient did not follow up.   Pulmonology- Dr López Pleitez- last appt 3/2019 for SOA, tobacco use, and infiltrate. Started on Breo despite PF not classic for asthma, advised smoking cessation, and consider CT if persistent infiltrate on follow up imaging.   Orthopedic surgery- Dr Snider- last appt 8/2018 for right shoulder pain- rotator cuff and biceps tendonitis. Injection, PT and follow up in 4 weeks. Did not return.     The following portions of the patient's history were reviewed and updated as appropriate: allergies, current medications, past family history, past medical history, past social history, past surgical history and problem list.    Review of Systems   Constitutional: Positive for fatigue and weight loss.   HENT: Negative.    Eyes: Negative.    Respiratory: Positive for shortness of breath.    Cardiovascular: Positive for chest pain, palpitations and leg swelling.   Gastrointestinal: Negative.    Musculoskeletal: Positive for arthralgias, back pain, neck pain and neck stiffness.   Skin:        Subcutaneous nodule   Neurological: Negative.    Hematological: Negative.    Psychiatric/Behavioral: Positive for sleep disturbance.       Objective   Vitals:    04/18/23 0844   BP: 112/64   Pulse: 86   Resp: 18   Temp: 98.9 °F (37.2 °C)   SpO2: 99%     Body mass index is 36.64 kg/m².    Physical Exam  Vitals and nursing note reviewed.   Constitutional:       General: She is not in acute distress.     Appearance: Normal appearance. She is well-developed.   HENT:      Head: Normocephalic and atraumatic.      Right Ear: External ear normal.      Left Ear: External ear normal.      Nose: Nose normal.   Eyes:      General: Lids are normal.      Conjunctiva/sclera: Conjunctivae normal.   Neck:      Vascular: No carotid bruit.   Cardiovascular:      Rate and Rhythm: Normal rate and regular rhythm.      Pulses: Normal pulses.      Heart sounds: Normal  heart sounds. No murmur heard.    No friction rub. No gallop.   Pulmonary:      Effort: Pulmonary effort is normal. No respiratory distress.      Breath sounds: Normal breath sounds. No wheezing, rhonchi or rales.   Musculoskeletal:         General: No deformity.      Cervical back: Neck supple.   Skin:     General: Skin is warm and dry.   Neurological:      Mental Status: She is alert and oriented to person, place, and time. Mental status is at baseline.      Gait: Gait normal.   Psychiatric:         Speech: Speech normal.         Behavior: Behavior normal.         Thought Content: Thought content normal.       Assessment & Plan   Diagnoses and all orders for this visit:    1. Elevated blood pressure reading (Primary)    2. Mixed hyperlipidemia  -     Comprehensive Metabolic Panel  -     CK  -     Lipid Panel With LDL / HDL Ratio    3. Prediabetes  -     Comprehensive Metabolic Panel  -     Hemoglobin A1c  -     TSH  -     T4, free  -     T3, Free  -     Urinalysis With Culture If Indicated -    4. Vitamin D deficiency  -     Comprehensive Metabolic Panel  -     Vitamin D,25-Hydroxy    5. B12 deficiency  -     CBC & Differential  -     Vitamin B12 & Folate  -     Vitamin B1, Whole Blood  -     Vitamin B2  -     Vitamin B6  -     Vitamin B7 (Biotin)    6. Biotin deficiency disease  -     CBC & Differential  -     Vitamin B12 & Folate  -     Vitamin B1, Whole Blood  -     Vitamin B2  -     Vitamin B6  -     Vitamin B7 (Biotin)    7. Iron deficiency  -     CBC & Differential  -     Iron and TIBC  -     Ferritin  -     Urinalysis With Culture If Indicated -    8. Leukocytosis, unspecified type  -     CBC & Differential  -     Urinalysis With Culture If Indicated -    9. Lymphocytosis  -     CBC & Differential    10. Restless leg syndrome, uncontrolled  -     CBC & Differential  -     Comprehensive Metabolic Panel  -     Hemoglobin A1c  -     Iron and TIBC  -     Ferritin  -     Vitamin B12 & Folate  -     Vitamin B1,  Whole Blood  -     Vitamin B2  -     Vitamin B6  -     Vitamin B7 (Biotin)  -     Ambulatory Referral to Sleep Medicine    11. Fatigue, unspecified type  -     CBC & Differential  -     Comprehensive Metabolic Panel  -     Hemoglobin A1c  -     Iron and TIBC  -     Ferritin  -     Vitamin B12 & Folate  -     Vitamin B1, Whole Blood  -     Vitamin B2  -     Vitamin B6  -     Vitamin B7 (Biotin)  -     Vitamin D,25-Hydroxy  -     TSH  -     T4, free  -     T3, Free  -     Ambulatory Referral to Sleep Medicine    12. Daytime somnolence  -     CBC & Differential  -     Comprehensive Metabolic Panel  -     Hemoglobin A1c  -     Iron and TIBC  -     Ferritin  -     Vitamin B12 & Folate  -     Vitamin B1, Whole Blood  -     Vitamin B2  -     Vitamin B6  -     Vitamin B7 (Biotin)  -     Vitamin D,25-Hydroxy  -     TSH  -     T4, free  -     T3, Free  -     Ambulatory Referral to Sleep Medicine    13. Sleep disturbance  -     CBC & Differential  -     Comprehensive Metabolic Panel  -     Hemoglobin A1c  -     Iron and TIBC  -     Ferritin  -     Vitamin B12 & Folate  -     Vitamin B1, Whole Blood  -     Vitamin B2  -     Vitamin B6  -     Vitamin B7 (Biotin)  -     Vitamin D,25-Hydroxy  -     TSH  -     T4, free  -     T3, Free  -     Ambulatory Referral to Sleep Medicine    14. Pedal edema  -     Ambulatory Referral to Sleep Medicine    15. Shift work sleep disorder  -     Ambulatory Referral to Sleep Medicine    16. Mixed anxiety depressive disorder    17. Allergic rhinitis, unspecified seasonality, unspecified trigger    18. Chronic left-sided low back pain without sciatica  -     JAKOB With / DsDNA, RNP, Sjogrens A / B, Smith  -     C-reactive Protein  -     Cyclic Citrul Peptide Antibody, IgG / IgA  -     Rheumatoid Factor  -     Sedimentation Rate  -     Uric Acid  -     Ambulatory Referral to Physical Therapy    19. Bilateral foot pain  -     CBC & Differential  -     Comprehensive Metabolic Panel  -     Hemoglobin  A1c  -     CK  -     Iron and TIBC  -     Ferritin  -     Vitamin B12 & Folate  -     Vitamin B1, Whole Blood  -     Vitamin B2  -     Vitamin B6  -     Vitamin B7 (Biotin)  -     Vitamin D,25-Hydroxy  -     TSH  -     T4, free  -     T3, Free  -     JAKOB With / DsDNA, RNP, Sjogrens A / B, Smith  -     C-reactive Protein  -     Cyclic Citrul Peptide Antibody, IgG / IgA  -     Rheumatoid Factor  -     Sedimentation Rate  -     Uric Acid  -     Ambulatory Referral to Physical Therapy    20. Encounter for hepatitis C screening test for low risk patient  -     Hepatitis C antibody    21. Multiple joint pain  -     JAKOB With / DsDNA, RNP, Sjogrens A / B, Smith  -     C-reactive Protein  -     Cyclic Citrul Peptide Antibody, IgG / IgA  -     Rheumatoid Factor  -     Sedimentation Rate  -     Uric Acid  -     Ambulatory Referral to Physical Therapy    22. Chronic pain of both knees  -     JAKOB With / DsDNA, RNP, Sjogrens A / B, Smith  -     C-reactive Protein  -     Cyclic Citrul Peptide Antibody, IgG / IgA  -     Rheumatoid Factor  -     Sedimentation Rate  -     Uric Acid  -     Ambulatory Referral to Physical Therapy    23. Left elbow pain  -     JAKOB With / DsDNA, RNP, Sjogrens A / B, Smith  -     C-reactive Protein  -     Cyclic Citrul Peptide Antibody, IgG / IgA  -     Rheumatoid Factor  -     Sedimentation Rate  -     Uric Acid  -     XR Elbow 2 View Left (In Office)  -     Ambulatory Referral to Physical Therapy    24. Numbness and tingling in right hand  -     JAKOB With / DsDNA, RNP, Sjogrens A / B, Smith  -     C-reactive Protein  -     Cyclic Citrul Peptide Antibody, IgG / IgA  -     Rheumatoid Factor  -     Sedimentation Rate  -     Uric Acid  -     Ambulatory Referral to Physical Therapy    25. Disturbance in sleep behavior  -     Ambulatory Referral to Sleep Medicine    Other orders  -     Microscopic Examination -  -     Urine culture, Comprehensive - ,        Assessment and plan  Patient will have fasting labs.  Call if no results in 1 week. Stability of conditions, plan, follow up, and further recommendations pending labs. Follow up in 3-6 months pending labs.     · Weight gain- Patient would like to consider medication for weight loss. She has had palpitations, chest pain, and labile BP. I would not recommend Phentermine or stimulant. She previously did not tolerate Wellbutrin, so I would not consider Contrave. However, she has prediabetes in the past and is overdue for follow up of this and fasting labs. She should complete fasting labs and could consider GLP-1 if continued prediabetes. She should have stress echo as ordered by cardiology, and if normal, she should increase exercise to 45 minutes, 5-6 days per week and ensure healthy diet- low fat and low carb.   · Elevated blood pressure-Blood pressure is okay today.  She had normal readings then borderline readings with some elevated BP.  She has not been taking her HCTZ daily, and I previously asked that she take HCTZ 12.5 mg once daily.  She was also advised to monitor her heart rate, as pulse was a little fast then normalized.  She should call if elevated blood pressure or heart rate and we can consider low-dose beta-blocker.  Episodes could be related to underlying sleep apnea, however, she has not seen sleep medicine as referred.  She should schedule appointment, as this could contribute to her BP and palpitations as well as weight.   · Mixed hyperlipidemia- Lipids have remained uncontrolled with elevated TG and LDL. We will need to consider medication pending labs. She will need work on diet, have stress echo per cardiology, and increase exercise if cleared by cardiology.  · Prediabetes- A1C remained elevated at 6.0% 6/2021. She did not return at 3 month follow up. I will consider GLP-1 if persistently elevated with weight gain, elevated TG, and prediabetes.     · Vitamin D deficiency- Take vitamin D 2000 IU daily.  Dosing recommendations pending labs.   · B12  deficiency- Patient should take B12 500 mcg daily and monthly B12 injections. Await labs for further recommendations.   · Biotin deficiency- Biotin supplement recommended 2/2021. I will continue to monitor.   · Iron deficiency- She should increase dietary iron and await follow up labs.   · Leukocytosis-This was thought to be from smoking history, however, I wanted hematology consultation to ensure no further workup or treatment is needed. Additional labs and thought to be reactive, likely from smoking.   · RLS- Symptoms are stable. Continue Requip 1 mg once daily as needed. I have referred to sleep medicine for sleep study for formal diagnosis. She did not go for appt as scheduled and should reschedule.   · Left chest pressure and dyspnea on exertion-EKG without acute changes. She still needs to complete stress echo as ordered. Patient to call to schedule this. She should also keep follow up with cardiology as scheduled.  · Fatigue- Labs with vitamin B12, B7, and vitamin D deficiency as well as slightly low iron. She was advised to supplements as directed.  With snoring and fatigue, she should also see sleep medicine as referred.  · Edema- I discussed with patient that PRN use of diuretics is not common and that edema is either a sign of underlying CHF or other chronic conditions or is related to lifestyle, diet, and exercise. I recommend Compression stockings, decrease sugars, starches, and salts, increase water, and ensure proper exercise daily (once cleared by cardiology). I discussed the need to see sleep medicine. Consider daily HCTZ 12.5 mg rather than PRN if elevated BP and persistent edema with use of compression stockings, negative stress echo and no cardiac etiology per specialists, proper diet, and negative sleep study.   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  She had improvement  on Prozac 20 mg daily and previously failed higher doses due to side effects. She then weaned off and does not feel she needs medication at this time. Consider follow up if worsening moods or the need for medication. We could also consider Pristiq with perimenopausal symptoms.   · Shift work sleep disturbance- She does need to see sleep medicine. With abnormal sleep schedule from work schedule change, she was advised she could use Trazodone as needed. She should avoid daily use to avoid tolerance, however, she can use occasionally as needed.   · Allergic rhinitis- Continue Flonase 2 sprays in each nostril once daily and Claritin or Zyrtec 10 mg once daily as needed.   · Low back pain with sciatica- Flexeril is working well and only used occasionally.  Follow-up if worsening pain, new or changing symptoms.  · Foot pain-Patient to continue stretches and exercises for plantar fasciitis and advised that she roll her foot on a frozen water bottle 3-4 times a day x10 to 15 minutes.  To follow up with podiatry as referred.  I did asked that she go to [employer] or [employer] to see about more custom shoe fit.   · Need for colon cancer screening- Patient previously had Cologuard ordered but did not complete. She discusses FHx colon cancer and precancerous polyps. She should contact Dr Fitzpatrick for colonoscopy, as she is already established with them.     From CPE 3/2021- Small subcutaneous lesion without irritation but otherwise normal exam. She needs updated GYN exam, PAP, mammogram, colonoscopy, and should consider Covid 19 injection. She has upcoming appt GYN 7/2021 and was referred for mammogram and colonoscopy.    Patient has seen specialists as noted above.  I have reviewed available records, including consult notes, labs, and imaging/testing.  Patient to follow-up with specialists as directed by them.    I spent 30 minutes caring for Mary Banda on this date of service. This time includes time spent by me in the following  activities as necessary: preparing for the visit, reviewing tests, specialists records and previous visits, obtaining and/or reviewing a separately obtained history, performing a medically appropriate exam and/or evaluation, counseling and educating the patient, family, caregiver, referring and/or communicating with other healthcare professionals, documenting information in the medical record, independently interpreting results and communicating that information with the patient, family, caregiver, and developing a medically appropriate treatment plan with consideration of other conditions, medications, and treatments.

## 2023-04-19 ENCOUNTER — TRANSCRIBE ORDERS (OUTPATIENT)
Dept: ADMINISTRATIVE | Facility: HOSPITAL | Age: 53
End: 2023-04-19
Payer: COMMERCIAL

## 2023-04-19 DIAGNOSIS — Z12.31 VISIT FOR SCREENING MAMMOGRAM: Primary | ICD-10-CM

## 2023-04-23 LAB
25(OH)D3+25(OH)D2 SERPL-MCNC: 21.9 NG/ML (ref 30–100)
ALBUMIN SERPL-MCNC: 4.3 G/DL (ref 3.8–4.8)
ALBUMIN/GLOB SERPL: 1.3 {RATIO} (ref 1.2–2.2)
ALP SERPL-CCNC: 99 IU/L (ref 44–121)
ALT SERPL-CCNC: 29 IU/L (ref 0–32)
ANA SER QL: POSITIVE
APPEARANCE UR: CLEAR
AST SERPL-CCNC: 15 IU/L (ref 0–40)
BACTERIA #/AREA URNS HPF: ABNORMAL /[HPF]
BACTERIA UR CULT: ABNORMAL
BACTERIA UR CULT: ABNORMAL
BASOPHILS # BLD AUTO: 0.1 X10E3/UL (ref 0–0.2)
BASOPHILS NFR BLD AUTO: 1 %
BILIRUB SERPL-MCNC: 0.2 MG/DL (ref 0–1.2)
BILIRUB UR QL STRIP: NEGATIVE
BIOTIN SERPL-MCNC: 0.07 NG/ML (ref 0.05–0.83)
BUN SERPL-MCNC: 14 MG/DL (ref 6–24)
BUN/CREAT SERPL: 16 (ref 9–23)
CALCIUM SERPL-MCNC: 9.4 MG/DL (ref 8.7–10.2)
CASTS URNS QL MICRO: ABNORMAL /LPF
CCP IGA+IGG SERPL IA-ACNC: 4 UNITS (ref 0–19)
CENTROMERE B AB SER-ACNC: <0.2 AI (ref 0–0.9)
CHLORIDE SERPL-SCNC: 101 MMOL/L (ref 96–106)
CHOLEST SERPL-MCNC: 241 MG/DL (ref 100–199)
CHROMATIN AB SERPL-ACNC: <0.2 AI (ref 0–0.9)
CK SERPL-CCNC: 67 U/L (ref 32–182)
CO2 SERPL-SCNC: 23 MMOL/L (ref 20–29)
COLOR UR: YELLOW
CREAT SERPL-MCNC: 0.9 MG/DL (ref 0.57–1)
CRP SERPL-MCNC: 6 MG/L (ref 0–10)
DSDNA AB SER-ACNC: <1 IU/ML (ref 0–9)
EGFRCR SERPLBLD CKD-EPI 2021: 78 ML/MIN/1.73
ENA JO1 AB SER-ACNC: <0.2 AI (ref 0–0.9)
ENA RNP AB SER-ACNC: 1 AI (ref 0–0.9)
ENA SCL70 AB SER-ACNC: <0.2 AI (ref 0–0.9)
ENA SM AB SER-ACNC: <0.2 AI (ref 0–0.9)
ENA SS-A AB SER-ACNC: 0.8 AI (ref 0–0.9)
ENA SS-B AB SER-ACNC: <0.2 AI (ref 0–0.9)
EOSINOPHIL # BLD AUTO: 0.2 X10E3/UL (ref 0–0.4)
EOSINOPHIL NFR BLD AUTO: 2 %
EPI CELLS #/AREA URNS HPF: ABNORMAL /HPF (ref 0–10)
ERYTHROCYTE [DISTWIDTH] IN BLOOD BY AUTOMATED COUNT: 13.6 % (ref 11.7–15.4)
ERYTHROCYTE [SEDIMENTATION RATE] IN BLOOD BY WESTERGREN METHOD: 23 MM/HR (ref 0–40)
FERRITIN SERPL-MCNC: 100 NG/ML (ref 15–150)
FOLATE SERPL-MCNC: 7.3 NG/ML
GLOBULIN SER CALC-MCNC: 3.2 G/DL (ref 1.5–4.5)
GLUCOSE SERPL-MCNC: 113 MG/DL (ref 70–99)
GLUCOSE UR QL STRIP: NEGATIVE
HBA1C MFR BLD: 6.4 % (ref 4.8–5.6)
HCT VFR BLD AUTO: 40.6 % (ref 34–46.6)
HCV IGG SERPL QL IA: NON REACTIVE
HDLC SERPL-MCNC: 46 MG/DL
HGB BLD-MCNC: 13.8 G/DL (ref 11.1–15.9)
HGB UR QL STRIP: ABNORMAL
IMM GRANULOCYTES # BLD AUTO: 0.1 X10E3/UL (ref 0–0.1)
IMM GRANULOCYTES NFR BLD AUTO: 1 %
IRON SATN MFR SERPL: 15 % (ref 15–55)
IRON SERPL-MCNC: 52 UG/DL (ref 27–159)
KETONES UR QL STRIP: NEGATIVE
LDLC SERPL CALC-MCNC: 158 MG/DL (ref 0–99)
LDLC/HDLC SERPL: 3.4 RATIO (ref 0–3.2)
LEUKOCYTE ESTERASE UR QL STRIP: ABNORMAL
LYMPHOCYTES # BLD AUTO: 4.3 X10E3/UL (ref 0.7–3.1)
LYMPHOCYTES NFR BLD AUTO: 35 %
Lab: ABNORMAL
MCH RBC QN AUTO: 28.6 PG (ref 26.6–33)
MCHC RBC AUTO-ENTMCNC: 34 G/DL (ref 31.5–35.7)
MCV RBC AUTO: 84 FL (ref 79–97)
MICRO URNS: ABNORMAL
MONOCYTES # BLD AUTO: 0.9 X10E3/UL (ref 0.1–0.9)
MONOCYTES NFR BLD AUTO: 7 %
NEUTROPHILS # BLD AUTO: 6.7 X10E3/UL (ref 1.4–7)
NEUTROPHILS NFR BLD AUTO: 54 %
NITRITE UR QL STRIP: NEGATIVE
OTHER ANTIBIOTIC SUSC ISLT: ABNORMAL
PH UR STRIP: 6.5 [PH] (ref 5–7.5)
PLATELET # BLD AUTO: 368 X10E3/UL (ref 150–450)
POTASSIUM SERPL-SCNC: 4.2 MMOL/L (ref 3.5–5.2)
PROT SERPL-MCNC: 7.5 G/DL (ref 6–8.5)
PROT UR QL STRIP: NEGATIVE
PYRIDOXAL PHOS SERPL-MCNC: 4.5 UG/L (ref 3.4–65.2)
RBC # BLD AUTO: 4.83 X10E6/UL (ref 3.77–5.28)
RBC #/AREA URNS HPF: ABNORMAL /HPF (ref 0–2)
RHEUMATOID FACT SERPL-ACNC: <10 IU/ML
SODIUM SERPL-SCNC: 141 MMOL/L (ref 134–144)
SP GR UR STRIP: 1.01 (ref 1–1.03)
T3FREE SERPL-MCNC: 3.4 PG/ML (ref 2–4.4)
T4 FREE SERPL-MCNC: 1.25 NG/DL (ref 0.82–1.77)
TIBC SERPL-MCNC: 355 UG/DL (ref 250–450)
TRIGL SERPL-MCNC: 199 MG/DL (ref 0–149)
TSH SERPL DL<=0.005 MIU/L-ACNC: 2.41 UIU/ML (ref 0.45–4.5)
UIBC SERPL-MCNC: 303 UG/DL (ref 131–425)
URATE SERPL-MCNC: 5.7 MG/DL (ref 2.6–6.2)
URINALYSIS REFLEX: ABNORMAL
UROBILINOGEN UR STRIP-MCNC: 0.2 MG/DL (ref 0.2–1)
VIT B1 BLD-SCNC: 150.7 NMOL/L (ref 66.5–200)
VIT B12 SERPL-MCNC: 386 PG/ML (ref 232–1245)
VIT B2 BLD-MCNC: 219 UG/L (ref 137–370)
VLDLC SERPL CALC-MCNC: 37 MG/DL (ref 5–40)
WBC # BLD AUTO: 12.4 X10E3/UL (ref 3.4–10.8)
WBC #/AREA URNS HPF: ABNORMAL /HPF (ref 0–5)

## 2023-04-26 DIAGNOSIS — E78.2 MIXED HYPERLIPIDEMIA: ICD-10-CM

## 2023-04-26 DIAGNOSIS — D72.820 LYMPHOCYTOSIS: ICD-10-CM

## 2023-04-26 DIAGNOSIS — G25.81 RESTLESS LEG SYNDROME, UNCONTROLLED: ICD-10-CM

## 2023-04-26 DIAGNOSIS — N39.0 URINARY TRACT INFECTION WITH HEMATURIA, SITE UNSPECIFIED: Primary | ICD-10-CM

## 2023-04-26 DIAGNOSIS — E53.8 BIOTIN DEFICIENCY DISEASE: ICD-10-CM

## 2023-04-26 DIAGNOSIS — R31.9 URINARY TRACT INFECTION WITH HEMATURIA, SITE UNSPECIFIED: Primary | ICD-10-CM

## 2023-04-26 DIAGNOSIS — R76.8 POSITIVE ANA (ANTINUCLEAR ANTIBODY): ICD-10-CM

## 2023-04-26 DIAGNOSIS — R20.0 NUMBNESS AND TINGLING IN RIGHT HAND: ICD-10-CM

## 2023-04-26 DIAGNOSIS — M25.561 CHRONIC PAIN OF BOTH KNEES: ICD-10-CM

## 2023-04-26 DIAGNOSIS — M25.522 LEFT ELBOW PAIN: ICD-10-CM

## 2023-04-26 DIAGNOSIS — M25.562 CHRONIC PAIN OF BOTH KNEES: ICD-10-CM

## 2023-04-26 DIAGNOSIS — R53.83 FATIGUE, UNSPECIFIED TYPE: ICD-10-CM

## 2023-04-26 DIAGNOSIS — G89.29 CHRONIC PAIN OF BOTH KNEES: ICD-10-CM

## 2023-04-26 DIAGNOSIS — M79.672 BILATERAL FOOT PAIN: ICD-10-CM

## 2023-04-26 DIAGNOSIS — M79.671 BILATERAL FOOT PAIN: ICD-10-CM

## 2023-04-26 DIAGNOSIS — D72.829 LEUKOCYTOSIS, UNSPECIFIED TYPE: ICD-10-CM

## 2023-04-26 DIAGNOSIS — M25.50 MULTIPLE JOINT PAIN: ICD-10-CM

## 2023-04-26 DIAGNOSIS — M54.50 CHRONIC LEFT-SIDED LOW BACK PAIN WITHOUT SCIATICA: ICD-10-CM

## 2023-04-26 DIAGNOSIS — R20.2 NUMBNESS AND TINGLING IN RIGHT HAND: ICD-10-CM

## 2023-04-26 DIAGNOSIS — G89.29 CHRONIC LEFT-SIDED LOW BACK PAIN WITHOUT SCIATICA: ICD-10-CM

## 2023-04-26 RX ORDER — ATORVASTATIN CALCIUM 20 MG/1
20 TABLET, FILM COATED ORAL NIGHTLY
Qty: 90 TABLET | Refills: 0 | Status: SHIPPED | OUTPATIENT
Start: 2023-04-26

## 2023-04-26 RX ORDER — AMOXICILLIN AND CLAVULANATE POTASSIUM 875; 125 MG/1; MG/1
1 TABLET, FILM COATED ORAL 2 TIMES DAILY
Qty: 20 TABLET | Refills: 0 | Status: SHIPPED | OUTPATIENT
Start: 2023-04-26

## 2023-04-27 ENCOUNTER — PATIENT MESSAGE (OUTPATIENT)
Dept: FAMILY MEDICINE CLINIC | Facility: CLINIC | Age: 53
End: 2023-04-27
Payer: COMMERCIAL

## 2023-04-27 DIAGNOSIS — F41.8 MIXED ANXIETY DEPRESSIVE DISORDER: Primary | ICD-10-CM

## 2023-04-27 RX ORDER — FLUOXETINE HYDROCHLORIDE 20 MG/1
20 CAPSULE ORAL DAILY
Qty: 90 CAPSULE | Refills: 0 | Status: SHIPPED | OUTPATIENT
Start: 2023-04-27

## 2023-04-27 NOTE — TELEPHONE ENCOUNTER
From: Mary Banda  To: Katie Felipe  Sent: 4/27/2023 7:35 AM EDT  Subject: Prozac    Good morning! During our visit we discussed a lot of information, one of those topics was restarting Prozac. I understand that it probably just got overlooked due to the number of issues that I spoke about that morning. Is there any way to still send a script over to the pharmacy for Prozac 20mg? If not, please let me know and I will make another appointment. Thank you for all that you do!

## 2023-04-30 ENCOUNTER — HOSPITAL ENCOUNTER (EMERGENCY)
Facility: HOSPITAL | Age: 53
Discharge: HOME | End: 2023-04-30
Attending: EMERGENCY MEDICINE | Admitting: EMERGENCY MEDICINE
Payer: COMMERCIAL

## 2023-04-30 ENCOUNTER — APPOINTMENT (OUTPATIENT)
Dept: GENERAL RADIOLOGY | Facility: HOSPITAL | Age: 53
End: 2023-04-30
Payer: COMMERCIAL

## 2023-04-30 VITALS
TEMPERATURE: 97.2 F | HEIGHT: 63 IN | OXYGEN SATURATION: 99 % | DIASTOLIC BLOOD PRESSURE: 82 MMHG | RESPIRATION RATE: 16 BRPM | HEART RATE: 77 BPM | WEIGHT: 200 LBS | BODY MASS INDEX: 35.44 KG/M2 | SYSTOLIC BLOOD PRESSURE: 141 MMHG

## 2023-04-30 DIAGNOSIS — R00.2 PALPITATIONS: Primary | ICD-10-CM

## 2023-04-30 LAB
ALBUMIN SERPL-MCNC: 4.2 G/DL (ref 3.5–5.2)
ALBUMIN/GLOB SERPL: 1.2 G/DL
ALP SERPL-CCNC: 99 U/L (ref 39–117)
ALT SERPL W P-5'-P-CCNC: 27 U/L (ref 1–33)
ANION GAP SERPL CALCULATED.3IONS-SCNC: 12.1 MMOL/L (ref 5–15)
AST SERPL-CCNC: 13 U/L (ref 1–32)
BASOPHILS # BLD AUTO: 0.07 10*3/MM3 (ref 0–0.2)
BASOPHILS NFR BLD AUTO: 0.6 % (ref 0–1.5)
BILIRUB SERPL-MCNC: 0.5 MG/DL (ref 0–1.2)
BUN SERPL-MCNC: 13 MG/DL (ref 6–20)
BUN/CREAT SERPL: 13.8 (ref 7–25)
CALCIUM SPEC-SCNC: 10 MG/DL (ref 8.6–10.5)
CHLORIDE SERPL-SCNC: 100 MMOL/L (ref 98–107)
CO2 SERPL-SCNC: 24.9 MMOL/L (ref 22–29)
CREAT SERPL-MCNC: 0.94 MG/DL (ref 0.57–1)
D DIMER PPP FEU-MCNC: <0.27 MCGFEU/ML (ref 0–0.5)
DEPRECATED RDW RBC AUTO: 40.3 FL (ref 37–54)
EGFRCR SERPLBLD CKD-EPI 2021: 74.1 ML/MIN/1.73
EOSINOPHIL # BLD AUTO: 0.12 10*3/MM3 (ref 0–0.4)
EOSINOPHIL NFR BLD AUTO: 1.1 % (ref 0.3–6.2)
ERYTHROCYTE [DISTWIDTH] IN BLOOD BY AUTOMATED COUNT: 13.2 % (ref 12.3–15.4)
GLOBULIN UR ELPH-MCNC: 3.6 GM/DL
GLUCOSE SERPL-MCNC: 115 MG/DL (ref 65–99)
HCT VFR BLD AUTO: 44.5 % (ref 34–46.6)
HGB BLD-MCNC: 15.4 G/DL (ref 12–15.9)
IMM GRANULOCYTES # BLD AUTO: 0.04 10*3/MM3 (ref 0–0.05)
IMM GRANULOCYTES NFR BLD AUTO: 0.4 % (ref 0–0.5)
LYMPHOCYTES # BLD AUTO: 3.79 10*3/MM3 (ref 0.7–3.1)
LYMPHOCYTES NFR BLD AUTO: 35 % (ref 19.6–45.3)
MCH RBC QN AUTO: 28.8 PG (ref 26.6–33)
MCHC RBC AUTO-ENTMCNC: 34.6 G/DL (ref 31.5–35.7)
MCV RBC AUTO: 83.2 FL (ref 79–97)
MONOCYTES # BLD AUTO: 0.83 10*3/MM3 (ref 0.1–0.9)
MONOCYTES NFR BLD AUTO: 7.7 % (ref 5–12)
NEUTROPHILS NFR BLD AUTO: 5.98 10*3/MM3 (ref 1.7–7)
NEUTROPHILS NFR BLD AUTO: 55.2 % (ref 42.7–76)
NRBC BLD AUTO-RTO: 0 /100 WBC (ref 0–0.2)
PLATELET # BLD AUTO: 399 10*3/MM3 (ref 140–450)
PMV BLD AUTO: 10 FL (ref 6–12)
POTASSIUM SERPL-SCNC: 3.8 MMOL/L (ref 3.5–5.2)
PROT SERPL-MCNC: 7.8 G/DL (ref 6–8.5)
QT INTERVAL: 351 MS
RBC # BLD AUTO: 5.35 10*6/MM3 (ref 3.77–5.28)
SODIUM SERPL-SCNC: 137 MMOL/L (ref 136–145)
T4 FREE SERPL-MCNC: 1.24 NG/DL (ref 0.93–1.7)
TROPONIN T SERPL HS-MCNC: <6 NG/L
TSH SERPL DL<=0.05 MIU/L-ACNC: 2.15 UIU/ML (ref 0.27–4.2)
WBC NRBC COR # BLD: 10.83 10*3/MM3 (ref 3.4–10.8)

## 2023-04-30 PROCEDURE — 84439 ASSAY OF FREE THYROXINE: CPT | Performed by: EMERGENCY MEDICINE

## 2023-04-30 PROCEDURE — 93010 ELECTROCARDIOGRAM REPORT: CPT | Performed by: INTERNAL MEDICINE

## 2023-04-30 PROCEDURE — 84484 ASSAY OF TROPONIN QUANT: CPT | Performed by: EMERGENCY MEDICINE

## 2023-04-30 PROCEDURE — 93005 ELECTROCARDIOGRAM TRACING: CPT | Performed by: EMERGENCY MEDICINE

## 2023-04-30 PROCEDURE — 80050 GENERAL HEALTH PANEL: CPT | Performed by: EMERGENCY MEDICINE

## 2023-04-30 PROCEDURE — 71045 X-RAY EXAM CHEST 1 VIEW: CPT

## 2023-04-30 PROCEDURE — 85379 FIBRIN DEGRADATION QUANT: CPT | Performed by: EMERGENCY MEDICINE

## 2023-04-30 PROCEDURE — 99283 EMERGENCY DEPT VISIT LOW MDM: CPT

## 2023-04-30 NOTE — ED NOTES
Pt arrived by PV reports chest palpations with SOA. Denies CP. Pt reports starting Prozac x2 days ago.

## 2023-04-30 NOTE — ED PROVIDER NOTES
EMERGENCY DEPARTMENT ENCOUNTER    Room Number:  16/16  Date seen:  4/30/2023  PCP: Katie Felipe PA  Historian: Patient      HPI:  Chief Complaint: Palpitations  A complete HPI/ROS/PMH/PSH/SH/FH are unobtainable due to: None  Context: Mary Banda is a 52 y.o. female who presents to the ED c/o palpitations.  Patient states she has been having palpitations since yesterday.  Has felt a little short of breath with them.  Has had some burning discomfort.  States she just started her Prozac on Thursday.  Has been seen by cardiology for similar symptoms in the past.  Has had 2 stress test that have been negative.  Has had CT angiogram that is been negative in the past.  Has had no lower extremity swelling            PAST MEDICAL HISTORY  Active Ambulatory Problems     Diagnosis Date Noted   • Mixed anxiety depressive disorder 06/28/2016   • Fatigue 06/28/2016   • Loss of hair 06/28/2016   • Herpes simplex type 1 infection 06/28/2016   • Hyperpigmentation of skin 06/28/2016   • Disc disorder of cervical region 06/28/2016   • Radicular pain 06/28/2016   • Tension headache 06/28/2016   • Insomnia 06/28/2016   • Pedal edema 10/13/2016   • Chronic left-sided low back pain without sciatica 03/06/2017   • Breast mass, right 07/27/2017   • Menopausal symptoms 01/04/2018   • Thumb pain, right 03/01/2018   • Restless legs syndrome 04/24/2018   • Precordial pain 03/24/2019   • Palpitations 03/24/2019   • Lymphocytosis 03/01/2021   • Hyperlipidemia LDL goal <100 11/30/2021   • Elevated glucose 11/30/2021     Resolved Ambulatory Problems     Diagnosis Date Noted   • Rebound mood swings 01/04/2018   • Influenza A 03/01/2018   • Physical exam, annual 12/11/2018   • Abnormal CT of the chest 03/24/2019     Past Medical History:   Diagnosis Date   • Allergic Years   • Anxiety    • Arthritis    • Breast nodule    • COVID 12/21/2021   • DDD (degenerative disc disease), lumbar    • Gallstones    • H/O Anxiety    • Heart palpitations    •  IBS (irritable bowel syndrome)    • Low back pain    • PONV (postoperative nausea and vomiting)    • Seasonal allergies    • Visual impairment Couple of months         PAST SURGICAL HISTORY  Past Surgical History:   Procedure Laterality Date   • BREAST BIOPSY Right 8/9/2017    Procedure: RIGHT BREAST BIOPSY WITH NEEDLE LOCALIZATION ;  Surgeon: Enrico Emerson MD;  Location: [facility] MAIN OR;  Service:    • BREAST BIOPSY Right 03/2017    U/S guided bx- non-atypical ductal hyperplasia   • CHOLECYSTECTOMY  2002   • ENDOMETRIAL ABLATION W/ NOVASURE  2006   • OOPHORECTOMY Left 1994    Part of left ovary removed   • TUBAL ABDOMINAL LIGATION  1995         FAMILY HISTORY  Family History   Problem Relation Age of Onset   • Thyroid disease Mother    • Heart disease Mother    • Depression Mother    • Hypothyroidism Mother    • Arthritis Mother    • Alcohol abuse Mother    • Stroke Father    • Hyperlipidemia Father    • Heart disease Father    • Diabetes Father    • Heart attack Father    • Arthritis Father    • Lupus Sister    • Aneurysm Sister    • Heart disease Sister    • Cerebral aneurysm Sister    • Early death Sister         Brain stem aneurysm   • Depression Daughter    • Hypertension Daughter    • Hypothyroidism Daughter    • Heart disease Maternal Grandmother    • Alcohol abuse Maternal Grandmother    • Lung cancer Maternal Grandmother    • Stroke Maternal Grandmother    • Heart disease Paternal Grandmother    • Lung cancer Paternal Grandmother    • Diabetes Paternal Aunt    • Heart disease Maternal Grandfather    • Heart attack Maternal Grandfather    • Diabetes Paternal Uncle    • Breast cancer Other          SOCIAL HISTORY  Social History     Socioeconomic History   • Marital status:      Spouse name: Ty   • Years of education: College   Tobacco Use   • Smoking status: Every Day     Packs/day: 1.00     Years: 32.00     Pack years: 32.00     Types: Cigarettes     Start date: 1/1/1991     Passive  exposure: Current   • Smokeless tobacco: Never   • Tobacco comments:     Caffeine - 3-4 diet pepsi   Vaping Use   • Vaping Use: Never used   Substance and Sexual Activity   • Alcohol use: Not Currently     Comment: social   • Drug use: Never   • Sexual activity: Yes     Partners: Male     Birth control/protection: Surgical     Comment: Tubal ligation         ALLERGIES  Sulfa antibiotics        REVIEW OF SYSTEMS  Review of Systems   Palpitations shortness of breath      PHYSICAL EXAM  ED Triage Vitals   Temp Heart Rate Resp BP SpO2   04/30/23 1222 04/30/23 1222 04/30/23 1222 04/30/23 1226 04/30/23 1222   97.2 °F (36.2 °C) (!) 133 18 152/90 100 %      Temp src Heart Rate Source Patient Position BP Location FiO2 (%)   04/30/23 1222 04/30/23 1222 -- -- --   Tympanic Monitor          Physical Exam      GENERAL: no acute distress  HENT: nares patent  EYES: no scleral icterus  CV: regular rhythm, tachycardic  RESPIRATORY: normal effort  ABDOMEN: soft  MUSCULOSKELETAL: no deformity  NEURO: alert, moves all extremities, follows commands  PSYCH:  calm, cooperative  SKIN: warm, dry    Vital signs and nursing notes reviewed.          LAB RESULTS  Recent Results (from the past 24 hour(s))   ECG 12 Lead Tachycardia    Collection Time: 04/30/23 12:30 PM   Result Value Ref Range    QT Interval 351 ms   Comprehensive Metabolic Panel    Collection Time: 04/30/23 12:42 PM    Specimen: Blood   Result Value Ref Range    Glucose 115 (H) 65 - 99 mg/dL    BUN 13 6 - 20 mg/dL    Creatinine 0.94 0.57 - 1.00 mg/dL    Sodium 137 136 - 145 mmol/L    Potassium 3.8 3.5 - 5.2 mmol/L    Chloride 100 98 - 107 mmol/L    CO2 24.9 22.0 - 29.0 mmol/L    Calcium 10.0 8.6 - 10.5 mg/dL    Total Protein 7.8 6.0 - 8.5 g/dL    Albumin 4.2 3.5 - 5.2 g/dL    ALT (SGPT) 27 1 - 33 U/L    AST (SGOT) 13 1 - 32 U/L    Alkaline Phosphatase 99 39 - 117 U/L    Total Bilirubin 0.5 0.0 - 1.2 mg/dL    Globulin 3.6 gm/dL    A/G Ratio 1.2 g/dL    BUN/Creatinine Ratio 13.8  7.0 - 25.0    Anion Gap 12.1 5.0 - 15.0 mmol/L    eGFR 74.1 >60.0 mL/min/1.73   Single High Sensitivity Troponin T    Collection Time: 04/30/23 12:42 PM    Specimen: Blood   Result Value Ref Range    HS Troponin T <6 <10 ng/L   D-dimer, Quantitative    Collection Time: 04/30/23 12:42 PM    Specimen: Blood   Result Value Ref Range    D-Dimer, Quantitative <0.27 0.00 - 0.50 MCGFEU/mL   TSH    Collection Time: 04/30/23 12:42 PM    Specimen: Blood   Result Value Ref Range    TSH 2.150 0.270 - 4.200 uIU/mL   T4, Free    Collection Time: 04/30/23 12:42 PM    Specimen: Blood   Result Value Ref Range    Free T4 1.24 0.93 - 1.70 ng/dL   CBC Auto Differential    Collection Time: 04/30/23 12:42 PM    Specimen: Blood   Result Value Ref Range    WBC 10.83 (H) 3.40 - 10.80 10*3/mm3    RBC 5.35 (H) 3.77 - 5.28 10*6/mm3    Hemoglobin 15.4 12.0 - 15.9 g/dL    Hematocrit 44.5 34.0 - 46.6 %    MCV 83.2 79.0 - 97.0 fL    MCH 28.8 26.6 - 33.0 pg    MCHC 34.6 31.5 - 35.7 g/dL    RDW 13.2 12.3 - 15.4 %    RDW-SD 40.3 37.0 - 54.0 fl    MPV 10.0 6.0 - 12.0 fL    Platelets 399 140 - 450 10*3/mm3    Neutrophil % 55.2 42.7 - 76.0 %    Lymphocyte % 35.0 19.6 - 45.3 %    Monocyte % 7.7 5.0 - 12.0 %    Eosinophil % 1.1 0.3 - 6.2 %    Basophil % 0.6 0.0 - 1.5 %    Immature Grans % 0.4 0.0 - 0.5 %    Neutrophils, Absolute 5.98 1.70 - 7.00 10*3/mm3    Lymphocytes, Absolute 3.79 (H) 0.70 - 3.10 10*3/mm3    Monocytes, Absolute 0.83 0.10 - 0.90 10*3/mm3    Eosinophils, Absolute 0.12 0.00 - 0.40 10*3/mm3    Basophils, Absolute 0.07 0.00 - 0.20 10*3/mm3    Immature Grans, Absolute 0.04 0.00 - 0.05 10*3/mm3    nRBC 0.0 0.0 - 0.2 /100 WBC       Ordered the above labs and reviewed the results.        RADIOLOGY  XR Chest 1 View    Result Date: 4/30/2023  XR CHEST 1 VW-  Clinical: Shortness of breath  COMPARISON examination 03/20/2019  FINDINGS: Cardiac size within normal limits. No mediastinal or hilar abnormality in the lungs are clear.  CONCLUSION: No  active disease of the chest  This report was finalized on 4/30/2023 1:04 PM by Dr. Mao Pinedo M.D.        Ordered the above noted radiological studies.  Chest x-ray independently interpreted by me and shows no evidence of pneumonia          PROCEDURES  Procedures        EKG          EKG time: 1230  Rhythm/Rate: Sinus tachycardia 103  P waves and MA: Normal P waves  QRS, axis: Normal QRS  ST and T waves: Nonspecific ST-T waves    Interpreted Contemporaneously by me, independently viewed  Unchanged compared to prior 3/20/2019      MEDICATIONS GIVEN IN ER  Medications - No data to display                MEDICAL DECISION MAKING, PROGRESS, and CONSULTS    Discussion below represents my analysis of pertinent findings related to patient's condition, differential diagnosis, treatment plan and final disposition.      Additional sources:  - Discussed/ obtained information from independent historians: None    - External (non-ED) record review: Epic reviewed and patient was seen in the office by primary doctor 4/18/2023 for high blood pressure and hyperlipidemia    - Chronic or social conditions impacting care: None    - Shared decision making: None      Orders placed during this visit:  Orders Placed This Encounter   Procedures   • XR Chest 1 View   • Comprehensive Metabolic Panel   • Single High Sensitivity Troponin T   • D-dimer, Quantitative   • TSH   • T4, Free   • CBC Auto Differential   • ECG 12 Lead Tachycardia   • CBC & Differential         Additional orders considered but not ordered:  None        Differential diagnosis includes but is not limited to:    Palpitations versus intermittent A-fib versus intermittent SVT      Independent interpretation of labs, radiology studies, and discussions with consultants:  ED Course as of 04/30/23 1401   Sun Apr 30, 2023   1351 13:51 EDT  Patient presents for evaluation of palpitations of unclear etiology.  Patient has had history of atrial tachycardia in the past.  Patient  does have EKG that sinus tachycardia.  No evidence of SVT or atrial fibrillation.  Patient's D-dimer is negative so doubt PE.  Normal thyroid functions.  Troponin negative.  Patient will be discharged home.  She will be referred back to Dr. Horn.  Patient instructed to decrease caffeine use as well. [SL]      ED Course User Index  [SL] Mathieu Davis MD                 DIAGNOSIS  Final diagnoses:   Palpitations         DISPOSITION  DISCHARGE    Patient discharged in stable condition.    Reviewed implications of results, diagnosis, meds, responsibility to follow up, warning signs and symptoms of possible worsening, potential complications and reasons to return to ER, including worsening symptoms    Patient/Family voiced understanding of above instructions.    Discussed plan for discharge, as there is no emergent indication for admission. Patient referred to primary care provider for BP management due to today's BP. Pt/family is agreeable and understands need for follow up and repeat testing.  Pt is aware that discharge does not mean that nothing is wrong but it indicates no emergency is present that requires admission and they must continue care with follow-up as given below or physician of their choice.     FOLLOW-UP  Carmen Horn MD  [address] [address]  [phone]    Schedule an appointment as soon as possible for a visit            Medication List      Changed    hydroCHLOROthiazide 12.5 MG capsule  Commonly known as: MICROZIDE  Take 1 capsule by mouth Every Morning.  What changed: how much to take     rOPINIRole 1 MG tablet  Commonly known as: REQUIP  TAKE 1 TABLET EVERY NIGHT TAKE ONE HOUR BEFORE BEDTIME  What changed: See the new instructions.                    Latest Documented Vital Signs:  As of 14:01 EDT  BP- 154/98 HR- 105 Temp- 97.2 °F (36.2 °C) (Tympanic) O2 sat- 100%              --    Please note that portions of this were completed with a voice recognition program.        Note Disclaimer: At [facility], we believe that sharing information builds trust and better relationships. You are receiving this note because you are receiving care at [facility] or recently visited. It is possible you will see health information before a provider has talked with you about it. This kind of information can be easy to misunderstand. To help you fully understand what it means for your health, we urge you to discuss this note with your provider.           Mathieu Davis MD  04/30/23 0701

## 2023-05-04 ENCOUNTER — HOSPITAL ENCOUNTER (OUTPATIENT)
Dept: MAMMOGRAPHY | Facility: HOSPITAL | Age: 53
Discharge: HOME | End: 2023-05-04
Admitting: PHYSICIAN ASSISTANT
Payer: COMMERCIAL

## 2023-05-04 DIAGNOSIS — Z12.31 VISIT FOR SCREENING MAMMOGRAM: ICD-10-CM

## 2023-05-04 PROCEDURE — 77063 BREAST TOMOSYNTHESIS BI: CPT

## 2023-05-04 PROCEDURE — 77067 SCR MAMMO BI INCL CAD: CPT

## 2023-05-08 DIAGNOSIS — R92.8 ABNORMAL MAMMOGRAM OF RIGHT BREAST: Primary | ICD-10-CM

## 2023-05-17 ENCOUNTER — OFFICE VISIT (OUTPATIENT)
Dept: MAMMOGRAPHY | Facility: CLINIC | Age: 53
End: 2023-05-17
Payer: COMMERCIAL

## 2023-05-17 VITALS
HEART RATE: 103 BPM | HEIGHT: 63 IN | OXYGEN SATURATION: 99 % | DIASTOLIC BLOOD PRESSURE: 76 MMHG | BODY MASS INDEX: 35.44 KG/M2 | SYSTOLIC BLOOD PRESSURE: 164 MMHG | WEIGHT: 200 LBS

## 2023-05-17 DIAGNOSIS — Z91.89 AT HIGH RISK FOR BREAST CANCER: Primary | ICD-10-CM

## 2023-05-17 NOTE — LETTER
May 17, 2023     BLAKE Mckenzie  [address] [facility] [address]    Patient: Mary Banda   YOB: 1970   Date of Visit: 5/17/2023       Dear BLAKE Gomez:    Thank you for referring Mary Banda to me for evaluation. Below are the relevant portions of my assessment and plan of care.    If you have questions, please do not hesitate to call me. I look forward to following Mary along with you.         Sincerely,        Enrico Emerson MD        CC: MD Ki Mejia, Enrico VALLE MD  05/17/23 1007  Signed  Subjective    Mary Banda is a 52 y.o. 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  I last saw her in September 2021.  She was being seen for intermittent right breast pain which had begun about 6 weeks prior to that visit.  She had a known cyst in the right breast at the time.  The patient states that the pain is still present in the right breast only.  She describes it as more of a dull aching type pain that is intermittent.  It is often in the lateral aspect of the breast but also occasionally occurs in the 6 o'clock position.  There does not appear to be anything in particular that aggravates it.  The patient has had an ablation but has noted some hot flashes.    She had imaging in May 2023.  She was noted to have a 2.7 cm mass in the posterior right breast which had previously been noted to be a simple cyst.  It has gotten slightly larger.  They also talked about some questionable architectural distortion in the right breast.  I personally reviewed those imaging studies.  I can see the questionable area in the mediolateral view but is really hard to see on the craniocaudal view.  Additional images have been recommended and are pending.    The patient's family history is significant for a sister that she is not close with that has developed breast cancer.  There is also a maternal cousin with breast  cancer diagnosed under the age of 50.  Her other  sister without the breast cancer has undergone genetic testing that was negative.      Review of Systems constitutional-no unusual changes in weight or appetite.  Past Medical History:   Diagnosis Date   • Abnormal CT of the chest 03/24/2019    Recheck CT chest 1/2020 with resolution of abnormality and no new abnormality   • Allergic Years    Sulfa   • Anxiety    • Arthritis    • Breast nodule     RIGHT   • COVID 12/21/2021   • DDD (degenerative disc disease), lumbar    • Gallstones    • H/O Anxiety    • Heart palpitations     AT TIMES   • IBS (irritable bowel syndrome)    • Low back pain    • PONV (postoperative nausea and vomiting)    • Seasonal allergies    • Visual impairment Couple of months    Vision getting worse, eyelids drooping     Past Surgical History:   Procedure Laterality Date   • BREAST BIOPSY Right 8/9/2017    Procedure: RIGHT BREAST BIOPSY WITH NEEDLE LOCALIZATION ;  Surgeon: Enrico Emerson MD;  Location: [facility];  Service:    • BREAST BIOPSY Right 03/2017    U/S guided bx- non-atypical ductal hyperplasia   • CHOLECYSTECTOMY  2002   • ENDOMETRIAL ABLATION W/ NOVASURE  2006   • OOPHORECTOMY Left 1994    Part of left ovary removed   • TUBAL ABDOMINAL LIGATION  1995     Family History   Problem Relation Age of Onset   • Thyroid disease Mother    • Heart disease Mother    • Depression Mother    • Hypothyroidism Mother    • Arthritis Mother    • Alcohol abuse Mother    • Stroke Father    • Hyperlipidemia Father    • Heart disease Father    • Diabetes Father    • Heart attack Father    • Arthritis Father    • Lupus Sister    • Aneurysm Sister    • Heart disease Sister    • Cerebral aneurysm Sister    • Early death Sister         Brain stem aneurysm   • Depression Daughter    • Hypertension Daughter    • Hypothyroidism Daughter    • Heart disease Maternal Grandmother    • Alcohol abuse Maternal Grandmother    • Lung cancer Maternal Grandmother     • Stroke Maternal Grandmother    • Heart disease Paternal Grandmother    • Lung cancer Paternal Grandmother    • Diabetes Paternal Aunt    • Heart disease Maternal Grandfather    • Heart attack Maternal Grandfather    • Diabetes Paternal Uncle    • Breast cancer Other      Social History     Socioeconomic History   • Marital status:      Spouse name: Ty   • Years of education: College   Tobacco Use   • Smoking status: Every Day     Packs/day: 1.00     Years: 32.00     Pack years: 32.00     Types: Cigarettes     Start date: 1/1/1991     Passive exposure: Current   • Smokeless tobacco: Never   • Tobacco comments:     Caffeine - 3-4 diet pepsi   Vaping Use   • Vaping Use: Never used   Substance and Sexual Activity   • Alcohol use: Not Currently     Comment: social   • Drug use: Never   • Sexual activity: Yes     Partners: Male     Birth control/protection: Surgical     Comment: Tubal ligation       Objective    Physical Exam  Constitutional-BMI 35.4, no acute distress  Right breast- medium size and symmetrical.  With the arms raised and the pectoralis muscle contracted, I do not see any skin dimpling.  There is also no nipple retraction or nipple discharge.  I do not palpate any masses in the breast that concern me.  In particular, I could not feel the cyst which is deep in the breast.  The breast was not particularly tender on today's exam.  Left breast-medium size and symmetrical.  There is no skin dimpling with the arms raised and the pectoralis muscle contracted.  The nipple is not retracted and there is no nipple discharge.  I do not palpate any worrisome masses in the breast.  Lymphatics-no cervical or axillary adenopathy.    Assessment & Plan   1. At high risk for breast cancer- her risk assessment models have estimated her lifetime risk between 33 and 39%.  Her 5-year risk is estimated at 2.1%.  These findings would qualify her to supplement MRI imaging along with yearly 3D mammography.  She would  like to pursue that.  She has some follow-up films ordered for the right breast.  If those look okay, we will then obtain an MRI in October.  We will alternate 6 months visits with her OB/GYN.  I do plan to see her back in 1 year.  I would also like to send her to the genetic counselor.  She could also consider chemoprevention and we talked about the medications involved along with the side effects.  At this point in time, she feels that the risk outweighs the benefit.  We can continue to talk about this in the future.  2.  Right breast pain-this appears to be stable.  The encounter diagnosis was At high risk for breast cancer.

## 2023-05-17 NOTE — PROGRESS NOTES
Subjective   Mary Banda is a 52 y.o. 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  I last saw her in September 2021.  She was being seen for intermittent right breast pain which had begun about 6 weeks prior to that visit.  She had a known cyst in the right breast at the time.  The patient states that the pain is still present in the right breast only.  She describes it as more of a dull aching type pain that is intermittent.  It is often in the lateral aspect of the breast but also occasionally occurs in the 6 o'clock position.  There does not appear to be anything in particular that aggravates it.  The patient has had an ablation but has noted some hot flashes.    She had imaging in May 2023.  She was noted to have a 2.7 cm mass in the posterior right breast which had previously been noted to be a simple cyst.  It has gotten slightly larger.  They also talked about some questionable architectural distortion in the right breast.  I personally reviewed those imaging studies.  I can see the questionable area in the mediolateral view but is really hard to see on the craniocaudal view.  Additional images have been recommended and are pending.    The patient's family history is significant for a sister that she is not close with that has developed breast cancer.  There is also a maternal cousin with breast cancer diagnosed under the age of 50.  Her other  sister without the breast cancer has undergone genetic testing that was negative.      Review of Systems constitutional-no unusual changes in weight or appetite.  Past Medical History:   Diagnosis Date   • Abnormal CT of the chest 03/24/2019    Recheck CT chest 1/2020 with resolution of abnormality and no new abnormality   • Allergic Years    Sulfa   • Anxiety    • Arthritis    • Breast nodule     RIGHT   • COVID 12/21/2021   • DDD (degenerative disc disease), lumbar    • Gallstones    • H/O Anxiety    •  Heart palpitations     AT TIMES   • IBS (irritable bowel syndrome)    • Low back pain    • PONV (postoperative nausea and vomiting)    • Seasonal allergies    • Visual impairment Couple of months    Vision getting worse, eyelids drooping     Past Surgical History:   Procedure Laterality Date   • BREAST BIOPSY Right 8/9/2017    Procedure: RIGHT BREAST BIOPSY WITH NEEDLE LOCALIZATION ;  Surgeon: Enrico Emerson MD;  Location: [facility] OR;  Service:    • BREAST BIOPSY Right 03/2017    U/S guided bx- non-atypical ductal hyperplasia   • CHOLECYSTECTOMY  2002   • ENDOMETRIAL ABLATION W/ NOVASURE  2006   • OOPHORECTOMY Left 1994    Part of left ovary removed   • TUBAL ABDOMINAL LIGATION  1995     Family History   Problem Relation Age of Onset   • Thyroid disease Mother    • Heart disease Mother    • Depression Mother    • Hypothyroidism Mother    • Arthritis Mother    • Alcohol abuse Mother    • Stroke Father    • Hyperlipidemia Father    • Heart disease Father    • Diabetes Father    • Heart attack Father    • Arthritis Father    • Lupus Sister    • Aneurysm Sister    • Heart disease Sister    • Cerebral aneurysm Sister    • Early death Sister         Brain stem aneurysm   • Depression Daughter    • Hypertension Daughter    • Hypothyroidism Daughter    • Heart disease Maternal Grandmother    • Alcohol abuse Maternal Grandmother    • Lung cancer Maternal Grandmother    • Stroke Maternal Grandmother    • Heart disease Paternal Grandmother    • Lung cancer Paternal Grandmother    • Diabetes Paternal Aunt    • Heart disease Maternal Grandfather    • Heart attack Maternal Grandfather    • Diabetes Paternal Uncle    • Breast cancer Other      Social History     Socioeconomic History   • Marital status:      Spouse name: Ty   • Years of education: College   Tobacco Use   • Smoking status: Every Day     Packs/day: 1.00     Years: 32.00     Pack years: 32.00     Types: Cigarettes     Start date: 1/1/1991      Passive exposure: Current   • Smokeless tobacco: Never   • Tobacco comments:     Caffeine - 3-4 diet pepsi   Vaping Use   • Vaping Use: Never used   Substance and Sexual Activity   • Alcohol use: Not Currently     Comment: social   • Drug use: Never   • Sexual activity: Yes     Partners: Male     Birth control/protection: Surgical     Comment: Tubal ligation       Objective   Physical Exam  Constitutional-BMI 35.4, no acute distress  Right breast- medium size and symmetrical.  With the arms raised and the pectoralis muscle contracted, I do not see any skin dimpling.  There is also no nipple retraction or nipple discharge.  I do not palpate any masses in the breast that concern me.  In particular, I could not feel the cyst which is deep in the breast.  The breast was not particularly tender on today's exam.  Left breast-medium size and symmetrical.  There is no skin dimpling with the arms raised and the pectoralis muscle contracted.  The nipple is not retracted and there is no nipple discharge.  I do not palpate any worrisome masses in the breast.  Lymphatics-no cervical or axillary adenopathy.    Assessment & Plan   1. At high risk for breast cancer- her risk assessment models have estimated her lifetime risk between 33 and 39%.  Her 5-year risk is estimated at 2.1%.  These findings would qualify her to supplement MRI imaging along with yearly 3D mammography.  She would like to pursue that.  She has some follow-up films ordered for the right breast.  If those look okay, we will then obtain an MRI in October.  We will alternate 6 months visits with her OB/GYN.  I do plan to see her back in 1 year.  I would also like to send her to the genetic counselor.  She could also consider chemoprevention and we talked about the medications involved along with the side effects.  At this point in time, she feels that the risk outweighs the benefit.  We can continue to talk about this in the future.  2.  Right breast pain-this  appears to be stable.  The encounter diagnosis was At high risk for breast cancer.

## 2023-05-18 DIAGNOSIS — R60.0 LOCALIZED EDEMA: ICD-10-CM

## 2023-05-18 RX ORDER — HYDROCHLOROTHIAZIDE 25 MG/1
25 TABLET ORAL DAILY
Qty: 90 TABLET | Refills: 1 | Status: CANCELLED | OUTPATIENT
Start: 2023-05-18

## 2023-05-18 NOTE — TELEPHONE ENCOUNTER
This medication has not been filled since 2020.  If she is having swelling, she probably needs evaluation.  She should also try decreasing salt, sugars, and ensure proper exercise and water intake.

## 2023-06-05 ENCOUNTER — HOSPITAL ENCOUNTER (OUTPATIENT)
Dept: MAMMOGRAPHY | Facility: HOSPITAL | Age: 53
Discharge: HOME | End: 2023-06-05
Payer: COMMERCIAL

## 2023-06-05 ENCOUNTER — HOSPITAL ENCOUNTER (OUTPATIENT)
Dept: ULTRASOUND IMAGING | Facility: HOSPITAL | Age: 53
Discharge: HOME | End: 2023-06-05
Payer: COMMERCIAL

## 2023-06-05 DIAGNOSIS — R92.8 ABNORMAL MAMMOGRAM OF RIGHT BREAST: ICD-10-CM

## 2023-06-05 PROCEDURE — G0279 TOMOSYNTHESIS, MAMMO: HCPCS

## 2023-06-05 PROCEDURE — 77065 DX MAMMO INCL CAD UNI: CPT

## 2023-06-05 PROCEDURE — 76642 ULTRASOUND BREAST LIMITED: CPT

## 2023-06-06 RX ORDER — HYDROCHLOROTHIAZIDE 12.5 MG/1
12.5 TABLET ORAL DAILY
Qty: 90 TABLET | Refills: 0 | Status: CANCELLED | OUTPATIENT
Start: 2023-06-06 | End: 2023-09-04

## 2023-06-06 NOTE — TELEPHONE ENCOUNTER
Left message to call back, OK FOR HUB TO READ  Additional images with no suspicious findings. Resume routine mammograms.

## 2023-06-09 NOTE — TELEPHONE ENCOUNTER
I called patients old pharmacy which was [facility] and they stated that since her account was closed they could not see when it was filled last. In patients chart the last person to refill was Dr. Holm on 11/26/2019 for a quantity of 30. - KL

## 2023-06-12 ENCOUNTER — TELEPHONE (OUTPATIENT)
Dept: GENETICS | Facility: HOSPITAL | Age: 53
End: 2023-06-12
Payer: COMMERCIAL

## 2023-06-13 ENCOUNTER — CLINICAL SUPPORT (OUTPATIENT)
Dept: GENETICS | Facility: HOSPITAL | Age: 53
End: 2023-06-13
Payer: COMMERCIAL

## 2023-06-13 ENCOUNTER — LAB (OUTPATIENT)
Dept: LAB | Facility: HOSPITAL | Age: 53
End: 2023-06-13
Payer: COMMERCIAL

## 2023-06-13 DIAGNOSIS — Z13.79 GENETIC TESTING: Primary | ICD-10-CM

## 2023-06-13 DIAGNOSIS — Z80.3 FAMILY HISTORY OF BREAST CANCER: ICD-10-CM

## 2023-06-13 DIAGNOSIS — Z80.49 FAMILY HISTORY OF UTERINE CANCER: ICD-10-CM

## 2023-06-13 DIAGNOSIS — R79.89 ABNORMAL CBC: Primary | ICD-10-CM

## 2023-06-13 PROCEDURE — 96040: CPT

## 2023-06-13 NOTE — TELEPHONE ENCOUNTER
I have the same- no refill for 4 years. This cannot be a medication that has been used daily. I talked to her about not using diuretic for PRN use for swelling- if it is for swelling- decrease salt, sugar, increase water, exercise, and wear compression stockings. If for elevated BP, she needs evaluation.

## 2023-06-13 NOTE — PROGRESS NOTES
Mary Banda, a 52-year-old female, was referred for genetic counseling due to a family history of breast cancer. Ms. Banda has no personal history of cancer. She was 12 at menarche and had her first child at age 23. She is mauricio-menopausal and retains her uterus and ovaries. Ms. Banda has annual mammograms and has had 1 prior biopsy that showed ductal hyperplasia of the usual type. She is being followed by [facility]. [facility] and plans to start breast MRIs. Dr. Emerson has previously calculated her Edilmaer-Nateck at a 33.0% lifetime risk for breast cancer, this would be considered “high risk”. She has not had a colonoscopy but does have Cologuard screening. She reports this screening has been negative. She was interested in discussing her risk for a hereditary cancer syndrome. Ms. Banda decided to pursue comprehensive genetic testing to evaluate her risk of cancer, therefore the CancerNext Panel was ordered through [employer] which analyzes BRCA1/2 and 34 additional genes associated with an increased cancer risk. Her blood was drawn on 6/13/23. Results are expected in 2-3 weeks.      PERTINENT FAMILY HISTORY: (See attached pedigree)   Sister:    Breast cancer  Pat. Cousin:    Uterine cancer, <50  Pat. Grandmother:   Lung cancer  Mat. Grandmother:   Lung cancer    We do not have medical records regarding any of these diagnoses.     RISK ASSESSMENT:  Ms. Banda's family history of breast cancer raises the question of a hereditary cancer syndrome.  NCCN guidelines for genetic testing for BRCA1/2 states that individuals with a close relative diagnosed with breast cancer at or below the age of 50 may consider genetic testing. Ms. Banda does not meet this criteria. Despite this, we discussed how genetic testing is still available to her. This risk assessment is based on the family history information provided at the time of the appointment.  The assessment could change in the future should new  information be obtained.    GENETIC COUNSELING (30 minutes):  We reviewed the family history information in detail. Cases of cancer follow three general patterns: sporadic, familial, and hereditary.  While most cancer is sporadic, some cases appear to occur in family clusters.  These cases are said to be familial and account for 10-20% of cancer cases.  Familial cases may be due to a combination of shared genes and environmental factors among family members.  In even fewer cases, the risk for cancer is inherited, and the genes responsible for the increased cancer risk are known.       Family histories typical of hereditary cancer syndromes usually include multiple first- and second-degree relatives diagnosed with cancer types that define a syndrome.  These cases tend to be diagnosed at younger-than-expected ages and can be bilateral or multifocal.  The cancer in these families follows an autosomal dominant inheritance pattern, which indicates the likely presence of a mutation in a cancer susceptibility gene.  Children and siblings of an individual believed to carry this mutation have a 50% chance of inheriting that mutation, thereby inheriting the increased risk to develop cancer.  These mutations can be passed down from the maternal or the paternal lineage.     Due to Ms. Banda's family history of breast cancer, we discussed hereditary breast cancer. Hereditary breast cancer accounts for 5-10% of all cases of breast cancer.  A significant proportion of hereditary breast cancer can be attributed to mutations in the BRCA1 and BRCA2 genes.  Mutations in these genes confer an increased risk for breast cancer, ovarian cancer, male breast cancer, prostate cancer, and pancreatic cancer.  Women with a BRCA1 or BRCA2 mutation have up to an 87% lifetime risk of breast cancer and up to a 60% risk of ovarian cancer.  There are other clinically significant breast cancer related genes in addition to BRCA1/2.      There are other  genes that are known to be associated with an increased risk for cancer.  Some of these genes have well defined cancer risks and established management guidelines.  Other genes that can be tested for have been more recently described, and there may be less data regarding the risks and therefore may not have established management guidelines. We discussed these limitations at length.  Based on Ms. Banda's desire to get as much information as possible regarding her personal risks and potential risks for her family, she opted to pursue testing through a panel that would look at several other genes known to increase the risk for cancer.     GENETIC TESTING:  The risks, benefits, and limitations of genetic testing and implications for clinical management following testing were reviewed.  DNA test results can influence decisions regarding screening and prevention.  Genetic testing can have significant psychological implications for both individuals and families. Also discussed was the possibility of employment and insurance discrimination based on genetic test results and the laws in place to prevent this, as well as the limitations of these laws.       We discussed panel testing, which would involve testing 36 genes associated with increased cancer risk. The implications of a positive or negative test result were discussed.  We also discussed the importance of testing an affected relative and how a negative result for Ms. Banda wouldn't necessarily mean the cancers presenting in her family weren't due to a genetic mutation that Ms. Banda did not inherit.  In general, a negative genetic test result is most informative if a mutation has first been established in an affected member of the family.  In cases where an affected individual is not available or interested in testing, it is appropriate to offer testing to an unaffected individual.     We discussed the possibility that, in some cases, genetic test results may be  ambiguous due to the identification of a genetic variant of uncertain significance (VUS). These variants may or may not be associated with an increased cancer risk. With multigene panel testing, it is not uncommon for a VUS to be identified.  If a VUS is identified, testing family members is typically not recommended and screening recommendations are made based on the family history.  The laboratories that perform genetic testing work to reclassify the VUS and send out an amended report if and when a VUS is reclassified.  The majority of variant findings are ultimately reclassified to a negative result. Given Ms. Banda's family history, a negative test result does not eliminate all cancer risk, although the risk may not be as high as it would with positive genetic testing.     PLAN: Genetic testing was ordered via the CancerNext Panel through [employer]. Results are expected in 2-3 weeks. If she has any questions in the meantime, she is welcome to call me at [phone].      Arelis Esteban, MS, [facility], [facility]  Licensed Certified Genetic Counselor

## 2023-10-19 ENCOUNTER — OFFICE VISIT (OUTPATIENT)
Dept: OBSTETRICS AND GYNECOLOGY | Age: 53
End: 2023-10-19
Payer: COMMERCIAL

## 2023-10-19 VITALS
BODY MASS INDEX: 36.68 KG/M2 | DIASTOLIC BLOOD PRESSURE: 70 MMHG | SYSTOLIC BLOOD PRESSURE: 128 MMHG | HEIGHT: 63 IN | WEIGHT: 207 LBS

## 2023-10-19 DIAGNOSIS — Z12.4 SCREENING FOR MALIGNANT NEOPLASM OF CERVIX: ICD-10-CM

## 2023-10-19 DIAGNOSIS — Z11.51 SCREENING FOR HUMAN PAPILLOMAVIRUS (HPV): ICD-10-CM

## 2023-10-19 DIAGNOSIS — Z01.419 WELL FEMALE EXAM WITH ROUTINE GYNECOLOGICAL EXAM: Primary | ICD-10-CM

## 2023-10-19 NOTE — PROGRESS NOTES
[facility]   Obstetrics and Gynecology     10/19/2023    Patient: Mary Banda          MR#:[id number]    History of Present Illness    Chief Complaint   Patient presents with    Gynecologic Exam     CC: Annual, last pap 10/15/21 neg, HPV neg, last mammo 23 neg, unable to void       53 y.o. female  who presents for annual exam. 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 Emerson for this has completed genetic screening TC score 27.5%  She reports she is unable to work out much due to work life balance  Completed a cologuard   PCP follows labs hyperlipidemia she is not currently taking her atorvastatin     Relevant data reviewed:    No LMP recorded. Patient has had an ablation.  Obstetric History:  OB History          5    Para   2    Term   2            AB   3    Living   2         SAB   2    IAB        Ectopic        Molar        Multiple        Live Births              Obstetric Comments   Took birth control for 5 years.               Menstrual History:     No LMP recorded. Patient has had an ablation.       Social History     Substance and Sexual Activity   Sexual Activity Yes    Partners: Male    Birth control/protection: Surgical    Comment: Tubal ligation     ______________________________________  Patient Active Problem List   Diagnosis    Mixed anxiety depressive disorder    Fatigue    Loss of hair    Herpes simplex type 1 infection    Hyperpigmentation of skin    Disc disorder of cervical region    Radicular pain    Tension headache    Insomnia    Pedal edema    Chronic left-sided low back pain without sciatica    Breast mass, right    Menopausal symptoms    Thumb pain, right    Restless legs syndrome    Precordial pain    Palpitations    Lymphocytosis    Hyperlipidemia LDL goal <100    Elevated glucose     Past Medical History:    Diagnosis Date    Abnormal CT of the chest 03/24/2019    Recheck CT chest 1/2020 with resolution of abnormality and no new abnormality    Allergic Years    Sulfa    Anxiety     Arthritis     Breast nodule     RIGHT    COVID 12/21/2021    DDD (degenerative disc disease), lumbar     Gallstones     H/O Anxiety     Heart palpitations     AT TIMES    IBS (irritable bowel syndrome)     Low back pain     PONV (postoperative nausea and vomiting)     Seasonal allergies     Visual impairment Couple of months    Vision getting worse, eyelids drooping     Past Surgical History:   Procedure Laterality Date    BREAST BIOPSY Right 8/9/2017    Procedure: RIGHT BREAST BIOPSY WITH NEEDLE LOCALIZATION ;  Surgeon: Enrico Emerson MD;  Location: [facility] OR;  Service:     BREAST BIOPSY Right 03/2017    U/S guided bx- non-atypical ductal hyperplasia    CHOLECYSTECTOMY  2002    ENDOMETRIAL ABLATION W/ NOVASURE  2006    OOPHORECTOMY Left 1994    Part of left ovary removed    TUBAL ABDOMINAL LIGATION  1995     Social History     Tobacco Use   Smoking Status Every Day    Packs/day: 1.00    Years: 32.00    Additional pack years: 0.00    Total pack years: 32.00    Types: Cigarettes    Start date: 1/1/1991    Passive exposure: Current   Smokeless Tobacco Never   Tobacco Comments    Caffeine - 3-4 diet pepsi     Family History   Problem Relation Age of Onset    Stroke Father     Hyperlipidemia Father     Heart disease Father     Diabetes Father     Heart attack Father     Arthritis Father     Thyroid disease Mother     Heart disease Mother     Depression Mother     Hypothyroidism Mother     Arthritis Mother     Alcohol abuse Mother     Lupus Sister     Aneurysm Sister     Heart disease Sister     Cerebral aneurysm Sister     Early death Sister         Brain stem aneurysm    Breast cancer Sister     Depression Daughter     Hypertension Daughter     Hypothyroidism Daughter     Heart disease Paternal Grandmother     Lung cancer Paternal  Grandmother     Heart disease Maternal Grandmother     Alcohol abuse Maternal Grandmother     Lung cancer Maternal Grandmother     Stroke Maternal Grandmother     Heart disease Maternal Grandfather     Heart attack Maternal Grandfather     Diabetes Paternal Aunt     Diabetes Paternal Uncle     Breast cancer Other     Ovarian cancer Neg Hx     Uterine cancer Neg Hx     Colon cancer Neg Hx      Prior to Admission medications    Medication Sig Start Date End Date Taking? Authorizing Provider   ibuprofen (ADVIL,MOTRIN) 400 MG tablet Take 1 tablet by mouth Every 6 (Six) Hours As Needed for Mild Pain.   Yes Elsa Roblero MD   atorvastatin (Lipitor) 20 MG tablet Take 1 tablet by mouth Every Night.  Patient not taking: Reported on 10/19/2023 4/26/23   Katie Felipe PA   cyclobenzaprine (FLEXERIL) 5 MG tablet Take 1 tablet by mouth 3 (Three) Times a Day As Needed for Muscle Spasms. 12/11/18   Rima Shahid APRN   FLUoxetine (PROzac) 20 MG capsule Take 1 capsule by mouth Daily. 4/27/23   Katie Felipe PA   fluticasone (FLONASE) 50 MCG/ACT nasal spray 2 sprays into the nostril(s) as directed by provider Daily. 12/11/18   Rima Shahid APRN   rOPINIRole (REQUIP) 1 MG tablet TAKE 1 TABLET EVERY NIGHT TAKE ONE HOUR BEFORE BEDTIME  Patient taking differently: As Needed. 11/23/20   Katie Felipe PA   trimethoprim-polymyxin b (Polytrim) 80746-8.1 UNIT/ML-% ophthalmic solution Administer 1 drop to both eyes Every 4 (Four) Hours. 6/4/23   Sergio Boyce APRN   vitamin B-12 (CYANOCOBALAMIN) 1000 MCG tablet Take 1 tablet by mouth Daily.    ProviderElsa MD     _______________________________________    Current contraception:  ablation  History of abnormal Pap smear: no  Family history of uterine or ovarian cancer: no  Family History of colon cancer/colon polyps: no  Family History of Breast Cancer:yes - sister   History of abnormal mammogram: yes - following with breast  "mri  History of abnormal lipids: yes - on medication     The following portions of the patient's history were reviewed and updated as appropriate: allergies, current medications, past family history, past medical history, past social history, past surgical history, and problem list.    Review of Systems    Pertinent items are noted in HPI.       Objective   Physical Exam    /70   Ht 160 cm (63\")   Wt 93.9 kg (207 lb)   BMI 36.67 kg/m²    BP Readings from Last 3 Encounters:   10/19/23 128/70   23 136/78   23 164/76      Wt Readings from Last 3 Encounters:   10/19/23 93.9 kg (207 lb)   23 90.7 kg (200 lb)   23 90.7 kg (200 lb)        BMI: Estimated body mass index is 36.67 kg/m² as calculated from the following:    Height as of this encounter: 160 cm (63\").    Weight as of this encounter: 93.9 kg (207 lb).       General: alert, appears stated age, and cooperative   Heart: regular rate and rhythm, S1, S2 normal, no murmur, click, rub or gallop   Lungs: clear to auscultation bilaterally   Abdomen: soft, non-tender, without masses or organomegaly and soft, non-tender, without masses, no organomegaly   Breast: inspection negative, no nipple discharge or bleeding, no masses or nodularity palpable   External genitalia/Vulva: External genitalia including bartholin's glands, Urethra, Pangburn's gland and urethra meatus are normal, Perineum, rectum and anus appear normal , and Bladder appears normal without significant prolapse    Vagina: normal mucosa, normal discharge   Cervix: no lesions   Uterus: normal size and non-tender   Adnexa: normal adnexa     As part of wellness and prevention, the following topics were discussed with the patient:  Encouraged self breast exam  Physical activity and regular exercised encouraged.   Healthy weight discussed.  Mental health discussed.     Patient is a smoker.      Problem List   Meds  History  Prep for Surg   Imagin}    Assessment:  Diagnoses and " all orders for this visit:    1. Well female exam with routine gynecological exam (Primary)  -     IGP, Apt HPV,rfx 16 / 18,45    2. Screening for human papillomavirus (HPV)  -     IGP, Apt HPV,rfx 16 / 18,45    3. Screening for malignant neoplasm of cervix  -     IGP, Apt HPV,rfx 16 / 18,45      Plan: pap this year we discussed guidelines encouraged healthy heart diet start her lipitor and exercise tobacco cessation we discussed magnesium for anxiety All of the patient's questions were addressed and answered, I have encouraged her to call for today's test results if she has not received them within 10 days.  Patient is advised to call with any change in her condition or with any other questions, otherwise return in 12 months for annual examination. Encouraged vitamin D 800mg supplement and 1200mg calcium supplement over the counter incorporate 150 minutes of aerobic exercise weekly with strength training  Return in 1 year (on 10/19/2024) for Annual exam.    Shayy Mazariegos, HOMERO  10/19/2023 09:36 EDT

## 2023-10-23 LAB
CYTOLOGIST CVX/VAG CYTO: NORMAL
CYTOLOGY CVX/VAG DOC CYTO: NORMAL
CYTOLOGY CVX/VAG DOC THIN PREP: NORMAL
DX ICD CODE: NORMAL
HIV 1 & 2 AB SER-IMP: NORMAL
HPV I/H RISK 4 DNA CVX QL PROBE+SIG AMP: NEGATIVE
OTHER STN SPEC: NORMAL
STAT OF ADQ CVX/VAG CYTO-IMP: NORMAL

## 2023-10-24 ENCOUNTER — HOSPITAL ENCOUNTER (OUTPATIENT)
Dept: MRI IMAGING | Facility: HOSPITAL | Age: 53
Discharge: HOME | End: 2023-10-24
Admitting: SURGERY
Payer: COMMERCIAL

## 2023-10-24 DIAGNOSIS — Z91.89 AT HIGH RISK FOR BREAST CANCER: ICD-10-CM

## 2023-10-24 PROCEDURE — 77049 MRI BREAST C-+ W/CAD BI: CPT

## 2023-10-24 PROCEDURE — A9577 INJ MULTIHANCE: HCPCS | Performed by: SURGERY

## 2023-10-24 PROCEDURE — 0 GADOBENATE DIMEGLUMINE 529 MG/ML SOLUTION: Performed by: SURGERY

## 2023-10-24 RX ADMIN — GADOBENATE DIMEGLUMINE 19 ML: 529 INJECTION, SOLUTION INTRAVENOUS at 09:21

## 2023-10-25 ENCOUNTER — TELEPHONE (OUTPATIENT)
Dept: MAMMOGRAPHY | Facility: CLINIC | Age: 53
End: 2023-10-25
Payer: COMMERCIAL

## 2023-10-25 NOTE — TELEPHONE ENCOUNTER
I spoke with her today and told her that the recent MRI showed no suspicious abnormalities in either breast.  There was a 2 cm cyst in the retroareolar position on the right side but nothing suspicious was noted.

## 2024-02-07 ENCOUNTER — OFFICE VISIT (OUTPATIENT)
Dept: FAMILY MEDICINE CLINIC | Facility: CLINIC | Age: 54
End: 2024-02-07
Payer: COMMERCIAL

## 2024-02-07 VITALS
SYSTOLIC BLOOD PRESSURE: 132 MMHG | BODY MASS INDEX: 37.21 KG/M2 | RESPIRATION RATE: 16 BRPM | HEIGHT: 63 IN | WEIGHT: 210 LBS | HEART RATE: 62 BPM | DIASTOLIC BLOOD PRESSURE: 88 MMHG | OXYGEN SATURATION: 100 % | TEMPERATURE: 98.9 F

## 2024-02-07 DIAGNOSIS — R03.0 ELEVATED BLOOD PRESSURE READING: ICD-10-CM

## 2024-02-07 DIAGNOSIS — J02.9 SORE THROAT: ICD-10-CM

## 2024-02-07 DIAGNOSIS — J06.9 ACUTE URI: ICD-10-CM

## 2024-02-07 DIAGNOSIS — R05.1 ACUTE COUGH: Primary | ICD-10-CM

## 2024-02-07 LAB
EXPIRATION DATE: NORMAL
EXPIRATION DATE: NORMAL
FLUAV AG UPPER RESP QL IA.RAPID: NOT DETECTED
FLUBV AG UPPER RESP QL IA.RAPID: NOT DETECTED
INTERNAL CONTROL: NORMAL
INTERNAL CONTROL: NORMAL
Lab: NORMAL
Lab: NORMAL
S PYO AG THROAT QL: NEGATIVE
SARS-COV-2 AG UPPER RESP QL IA.RAPID: NOT DETECTED

## 2024-02-07 PROCEDURE — 87880 STREP A ASSAY W/OPTIC: CPT | Performed by: PHYSICIAN ASSISTANT

## 2024-02-07 PROCEDURE — 99213 OFFICE O/P EST LOW 20 MIN: CPT | Performed by: PHYSICIAN ASSISTANT

## 2024-02-07 PROCEDURE — 87428 SARSCOV & INF VIR A&B AG IA: CPT | Performed by: PHYSICIAN ASSISTANT

## 2024-02-07 RX ORDER — FLUTICASONE PROPIONATE 50 MCG
2 SPRAY, SUSPENSION (ML) NASAL DAILY
Qty: 16 G | Refills: 0 | Status: SHIPPED | OUTPATIENT
Start: 2024-02-07 | End: 2024-03-08

## 2024-02-07 RX ORDER — HYDROCHLOROTHIAZIDE 12.5 MG/1
12.5 CAPSULE, GELATIN COATED ORAL DAILY
COMMUNITY
End: 2024-02-07 | Stop reason: SDUPTHER

## 2024-02-07 RX ORDER — HYDROCHLOROTHIAZIDE 12.5 MG/1
12.5 CAPSULE, GELATIN COATED ORAL DAILY
Qty: 30 CAPSULE | Refills: 0 | Status: SHIPPED | OUTPATIENT
Start: 2024-02-07

## 2024-02-10 LAB
BACTERIA SPEC RESP CULT: NORMAL
BACTERIA SPEC RESP CULT: NORMAL

## 2024-02-16 ENCOUNTER — OFFICE VISIT (OUTPATIENT)
Dept: FAMILY MEDICINE CLINIC | Facility: CLINIC | Age: 54
End: 2024-02-16
Payer: COMMERCIAL

## 2024-02-16 VITALS
WEIGHT: 214 LBS | RESPIRATION RATE: 18 BRPM | BODY MASS INDEX: 37.92 KG/M2 | SYSTOLIC BLOOD PRESSURE: 122 MMHG | DIASTOLIC BLOOD PRESSURE: 80 MMHG | HEIGHT: 63 IN | TEMPERATURE: 97.8 F | OXYGEN SATURATION: 100 % | HEART RATE: 79 BPM

## 2024-02-16 DIAGNOSIS — E61.1 IRON DEFICIENCY: ICD-10-CM

## 2024-02-16 DIAGNOSIS — M54.50 CHRONIC LEFT-SIDED LOW BACK PAIN WITHOUT SCIATICA: ICD-10-CM

## 2024-02-16 DIAGNOSIS — R76.8 POSITIVE ANA (ANTINUCLEAR ANTIBODY): ICD-10-CM

## 2024-02-16 DIAGNOSIS — M19.049 INFLAMMATION OF HAND JOINT, UNSPECIFIED LATERALITY: ICD-10-CM

## 2024-02-16 DIAGNOSIS — E53.8 B12 DEFICIENCY: ICD-10-CM

## 2024-02-16 DIAGNOSIS — Z91.89 INCREASED RISK OF BREAST CANCER: ICD-10-CM

## 2024-02-16 DIAGNOSIS — R00.2 PALPITATIONS: ICD-10-CM

## 2024-02-16 DIAGNOSIS — E78.2 MIXED HYPERLIPIDEMIA: ICD-10-CM

## 2024-02-16 DIAGNOSIS — F41.8 MIXED ANXIETY DEPRESSIVE DISORDER: ICD-10-CM

## 2024-02-16 DIAGNOSIS — R03.0 ELEVATED BLOOD PRESSURE READING: Primary | ICD-10-CM

## 2024-02-16 DIAGNOSIS — E53.8 BIOTIN DEFICIENCY DISEASE: ICD-10-CM

## 2024-02-16 DIAGNOSIS — M79.672 BILATERAL FOOT PAIN: ICD-10-CM

## 2024-02-16 DIAGNOSIS — Z01.810 PREOP CARDIOVASCULAR EXAM: ICD-10-CM

## 2024-02-16 DIAGNOSIS — M79.671 BILATERAL FOOT PAIN: ICD-10-CM

## 2024-02-16 DIAGNOSIS — M25.561 CHRONIC PAIN OF BOTH KNEES: ICD-10-CM

## 2024-02-16 DIAGNOSIS — M25.562 CHRONIC PAIN OF BOTH KNEES: ICD-10-CM

## 2024-02-16 DIAGNOSIS — G47.26 SHIFT WORK SLEEP DISORDER: ICD-10-CM

## 2024-02-16 DIAGNOSIS — R60.0 LOCALIZED EDEMA: ICD-10-CM

## 2024-02-16 DIAGNOSIS — G89.29 CHRONIC LEFT-SIDED LOW BACK PAIN WITHOUT SCIATICA: ICD-10-CM

## 2024-02-16 DIAGNOSIS — R20.2 NUMBNESS AND TINGLING IN RIGHT HAND: ICD-10-CM

## 2024-02-16 DIAGNOSIS — R53.83 FATIGUE, UNSPECIFIED TYPE: ICD-10-CM

## 2024-02-16 DIAGNOSIS — R40.0 DAYTIME SOMNOLENCE: ICD-10-CM

## 2024-02-16 DIAGNOSIS — R20.0 NUMBNESS AND TINGLING IN RIGHT HAND: ICD-10-CM

## 2024-02-16 DIAGNOSIS — E55.9 VITAMIN D DEFICIENCY: ICD-10-CM

## 2024-02-16 DIAGNOSIS — R73.03 PREDIABETES: ICD-10-CM

## 2024-02-16 DIAGNOSIS — N64.4 BREAST PAIN: ICD-10-CM

## 2024-02-16 DIAGNOSIS — G47.9 SLEEP DISTURBANCE: ICD-10-CM

## 2024-02-16 DIAGNOSIS — M25.50 MULTIPLE JOINT PAIN: ICD-10-CM

## 2024-02-16 DIAGNOSIS — G47.9 DISTURBANCE IN SLEEP BEHAVIOR: ICD-10-CM

## 2024-02-16 DIAGNOSIS — G25.81 RESTLESS LEG SYNDROME, UNCONTROLLED: ICD-10-CM

## 2024-02-16 DIAGNOSIS — R07.2 PRECORDIAL PAIN: ICD-10-CM

## 2024-02-16 DIAGNOSIS — G89.29 CHRONIC PAIN OF BOTH KNEES: ICD-10-CM

## 2024-02-16 DIAGNOSIS — J30.9 ALLERGIC RHINITIS, UNSPECIFIED SEASONALITY, UNSPECIFIED TRIGGER: ICD-10-CM

## 2024-02-16 DIAGNOSIS — D72.820 LYMPHOCYTOSIS: ICD-10-CM

## 2024-02-16 DIAGNOSIS — D72.829 LEUKOCYTOSIS, UNSPECIFIED TYPE: ICD-10-CM

## 2024-02-16 NOTE — PROGRESS NOTES
"Subjective   Mary Banda is a 53 y.o. female who presents today in follow up of blood pressure, edema, hyperlipidemia, prediabetes, vitamin D deficiency, B12 deficiency, biotin deficiency, iron deficiency, leukocytosis, RLS, abnormal sleep behavior, fatigue, moods, allergic rhinitis, breast symptoms, back and neck pain, foot pain, hand numbness, and specialists.     HPI    The past couple weeks, when she sits down, she falls right to sleep. Never did that in the past. Sometimes falls asleep and cannot sleep at night and sometimes can still sleep at night.     Foot pain- surgery for ganglion cyst at [facility] to be performed 2/22/2024. Off 2 weeks after surgery. She is hoping that she will be able to help knee pain and other pains if does not hurt to walk.   She had mostly left foot pain-sharp pain in medial and lateral aspect of her foot. They advised neuroma with previous podiatry. She was in a boot for a while. Has not returned to podiatry. When flexes toes, under left 1st MCP joint- has pain. If not wearing tennis shoes, she has increased pain in feet.   Patient complained of bilateral foot pain x > 1 year. When she was on her feet all day, waking up in the morning, and after sitting down in the morning then getting back up, she has lateral foot pain. She tried padded toe separators for possible bunion but no inserts. She thought they helped when she wore them but did not tolerate well.     Elevated BP-taking HCTZ 12.5 mg daily- morning 130s/80s and evenings has been 120s/70s-80s.   She previously reported her BP has been \"decent\"130s/80s.   8/2021- Finished med pass and felt left chest discomfort and uneasy- 179/97. Took Aspirin. No radiation of check. She has palpitations but none more than normal. She noticed SOA with exertion. She was sweating but was wearing N95 and PPE. No nausea, vomiting. She clocked out, took a nap, sat and charted, and decreased to 135/53. She had diet Pepsi- not abnormal. Not " more stressed than normal and was done early- maybe more stressed with work. Now back to N95 and gowns and all patients on isolation. No other contributing factors. 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   Checked BP- 134/53, 155/81 after med pass. Does not usually check before and after med pass.    Edema- swelling continues- mild.    She would take in the past for puffy hands. Patient has Rx from previous PCP for HCTZ 12.5 mg daily. Taking 1-2 x weekly. Does not have some puffiness in hands. Notices the day after a salty. No SOA associated with swelling.   Mixed hyperlipidemia-started on Lipitor 20 mg nightly 4/2023.  Was to follow-up in 1 month nonfasting in 3 months fasting and did not follow-up.  Patient was on no medication. Has not been diligent with diet or exercise. Decreasing sodas and increasing water. Patient did not follow up 3 months. Remembers most nights.    Prediabetes- A1C has been elevated for a couple years- up to 6%.  Up to 6.4% 4/2023.  Was to follow-up in 3 months but did not follow-up.  No medications.   She did not follow up 3 months from 6/2021  Vitamin D deficiency- taking vitamin D but not sure dosage. Remembers most mornings.   Advised to take vitamin D 2000 IU daily 4/2023.  Was to recheck in 3 months.  Did not follow-up.  B12 deficiency- taking B12- not sure the dose. Remembers most mornings.   Advised to take B12 100 mcg daily 4/2023.  Patient did not follow-up in 3 months as directed.  She was taking B12 not sure the dose.   Previously advised to take 500 mcg daily and once monthly B12 injection 6/2021. She did not follow up in 3 months as directed.   Biotin deficiency- no longer taking.   Per labs 2/2021, to take Biotin supplement- taking but not sure the amount.   Iron deficiency- low iron saturation but iron was ok. Referred to hematology 2/2021. Not taking iron.   Leukocytosis- no signs of infection or malignancy, recent unexpected weight loss. She  continues to smoke- Rx Chantix 12/2020. She was referred to hematology 2/2021.     Has not seen Sleep Medicine as referred.   RLS- does not take all the time but when she takes, she sleeps more. Patient reports the Requip makes tired.   Has not had sleep study.   Dx by Roberta.   Requip 1 mg once daily- works for symptoms- not taking every night. The 1 mg tablet makes her too sleepy, so she only takes if her legs are bothersome.    Abnormal sleep behavior-Did not see sleep medicine as referred.  She reports she is eating in her sleep. She reports she wok up and had a package of Oreos. Waking up every 2-3 hours.   Fatigue/snoring-patient did not see sleep medicine as referred.  She complained of being tired during the day. If she sits down, she falls asleep. Snoring- loud but no reported apnea.   She changed to night shift- made life easier- easier at night. Hard to change back and forth when off. Working 3 days per week. Trouble falling asleep and had to get up to urinate frequently. She was not sleeping well on 1st shift. 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     Moods- doing pretty well.   She had increased stress and has been very busy- she would like to restart Prozac. 20 mg worked well and 40 mg caused sexual dysfunction  3/2022- She felt she did not need to take anything and was doing ok.   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 Now has mother in law living with her and work changes with Covid. Having increased anxiety and has noticed her BP was up.   In the past, was on Prozac 20 mg daily- worked very well then wasn't enough and increased it and was too much- sexual AE. No mood AE with increased dosing. No SI/HI, concerns for safety.   She previously felt something and noticed anxiety was causing SOA. She continued with symptoms but not as often. She wanted to stay on the same dose of Prozac. Did not do well on 40 mg- sexual  AE.  Previous medications- Lexapro- didn't help and may have made symptoms worse. Wellbutrin- didn't help- did nothing. No other medications. Prozac- stopped working but sexual AE on 40 mg.     Allergic rhinitis- previously Flonase as needed. Not taking now.     Breast itching, family history of BRCA-following with high risk breast specialist-Dr. Emerson.  Also continues follow-up with GYN.  Advised MRI breast and 3D mammograms.  Consideration of chemoprevention.  Patient would like to wait on chemoprevention.  Patient's GYN- Dr Rios- 2021 and was to follow up in 1 month. She has had increased breast itching. Daughter and niece positive for BRCA gene. She had a lump in her breast and they are sending to a specialist. She cannot get much information from her daughter.   Left elbow pain x 4 months. She reports she has had left elbow pain with turning her arm over, can feel pain. She has compression sleeve that she wears at times that helps. No tendonitis brace.     Positive JAKOB, inflammation on hand ultrasound-seen by rheumatology and advised consideration of Plaquenil.  Patient wanted to wait.  Previously orthopedist in the past. Last appt 10/2023.    Mother with RA, sister with Lupus, RA, and Raynaud's.   Bilateral foot pain, bilateral knee pain.  Low back pain with sciatica, cervical DDD-   Pain is worse at certain times. If lays flat, back hurts. Neck was not hurting. The only time can sit and work on something is cross stitch.   Flexeril- takes as needed- has not taken in months.   She reported she has a knot on her back for several months. No changes, pain, or itching. Small.     Right hand numbness- still happens occasionally. Positional and improves with putting her arm down.   She reported when she held her right arm up, she had numbness with cross stitch.     Colon cancer screening- negative Cologuard 6/5/023.   PGUncle with Colon cancer. Sister with precancerous polyps. She has Lupus. Parents had normal  colonoscopy.     Patient's Specialists:  [facility] surgery- Dr Emerosn- last appt 5/2023 for right breast pain, at high risk for breast cancer-estimated lifetime risk 33 and 39%.  Findings qualify for supplement MRI imaging along with yearly 3D mammography.  Alternate 6-month visits with OB/GYN.  Referred to genetic counselor.  She could also consider chemoprevention and talked about medications involved along with side effects.  She would like to wait on medication because she felt risks outweighed the benefits.  Follow-up 1 year.  Prior-breast pain thought to be fibrocystic condition. Discussed decreasing caffeine, oral vit E 400 units daily or BID, and evening primrose oil 3335-1093 mg orally. Continue sports bra and ising diclofenac gel. Follow up 3 months. Patient cancelled follow up 12/2021 and did  Not reschedule.   Cardiology- Dr Horn- last appt 11/2021 for precordial CP, palpitations, elevated glucose, and hyperlipidemia. Advised stress echo, low salt diet and regular exercise once stress test completed, recommended low cholesterol, low carb diet, increase omega 3, decrease excessive caffeine intake and 2 week Zio patch if persistent symptoms, consider treatment prediabetes. Follow up APRN 6 months and MD in 1 year. Patient no show 2 stress echo appts. Follow up scheduled 6/2022-patient canceled and has not rescheduled.   Prior 3/2019 for precordial chest pain, palpitations, abnormal CT chest, FHx tachycardia mediated cardiomyopathy, premature CAD, FHx cerebral aneurysm. Referred for CTA chest, echo, and holter monitor. CTA with patchy infiltrate- started on Levaquin and pulmonology follow up in 1 week.   Negative Echo and stress test and holter with 1 episode of atrial tach- advised avoid stimulants, caffeine, alcohol, chocolate. Advised follow up in 6 months. Patient no show follow up.    Genetics-genetic testing negative for pathogenic mutations by sequencing, rearrangement testing, and RNA analysis for 36  genes on the CancerNext panel.  Discussed chemoprevention-tamoxifen, raloxifene.  General surgery- Dr Espinoza Fitzpatrick- last appt 10/2021 for sebaceous cyst. Recovered with antibiotics. No need for excision. Return if cyst recurs for consideration of excision.   Neurosurgery- HOMERO Horn- last appt 5/2017 for low back pain with sciatica. 4/2017 MRI mild scoliosis centered at L3-4, mild degenerative changes, L5-S1 right sided neuroforaminal narrowing. Follow up PRN.   GYN-HOMERO Turner-last appointment 10/2023 for well woman exam, Pap, HPV testing.  Advised healthy heart and consider lipids were, exercise, tobacco cessation, and magnesium for anxiety.  Follow-up 1 year.  Prior Dr Loretta Briceño- last appt 10/2021 for well woman exam with PAP, mammogram up to date, epidermal inclusion cyst labia minora, menopausal symptoms. Consider I&D but no issues. Discussed FSH and menopause- declined FSH at that time. Follow up 1 year.   Prior- Dr Matthew Tamayo- 11/2016 for well woman exam and galactorrhea. Normal exam, annual mammogram, labs. Follow up in 1 year. Follow up Dr Solo 7/29/2021.   Hematology- Dr Rose- last appt 3/1/2021 for chronic leukocytosis with intermittent neutrophilia, lymphocytosis, monocytosis, fatigue. Thought to be reactive and related to smoking.  Recommend sleep medicine evaluation. Needed updated mammogram, pelvic exam, and colonoscopy as well as smoking cessation.   Sleep medicine- referred with appt 4/2021. Patient cancelled by automated reminder system and did not reschedule.  Referred again and had appointment 5/22/2023-patient canceled and did not reschedule.  Hand surgery- Dr Melo- last appt 1/2020 for arthritis hand and trigger finger. Injection and was to follow up in 6 weeks. Patient did not follow up.   Pulmonology- Dr López Pleitez- last appt 3/2019 for SOA, tobacco use, and infiltrate. Started on Breo despite PF not classic for asthma, advised smoking cessation, and  consider CT if persistent infiltrate on follow up imaging.   Orthopedic surgery-   Katie Hunt PA-C-last appointment 11/2023 for pain in the right shoulder, tendinitis right rotator cuff.  Injection into the right subacromial space.  Dr Snider- last appt 8/2018 for right shoulder pain- rotator cuff and biceps tendonitis. Injection, PT and follow up in 4 weeks. Did not return.   Rheumatology-Dr. Selene Mcintyre-last appointment in 2023 for positive JAKBO, raised antibody titer, disorder of the immune system, history of leukocytosis, elevated BMI.  Discussed some inflammation on ultrasound and consideration of Plaquenil.  Patient declined Plaquenil treatment, as she had improvement in symptoms.  Follow-up 4 to 5 months.    The following portions of the patient's history were reviewed and updated as appropriate: allergies, current medications, past family history, past medical history, past social history, past surgical history and problem list.    Review of Systems   Constitutional:  Positive for fatigue and weight loss.   HENT: Negative.     Eyes: Negative.    Respiratory:  Positive for shortness of breath.    Cardiovascular:  Positive for chest pain, palpitations and leg swelling.   Gastrointestinal: Negative.    Musculoskeletal:  Positive for arthralgias, back pain, neck pain and neck stiffness.   Skin:         Subcutaneous nodule   Neurological: Negative.    Hematological: Negative.    Psychiatric/Behavioral:  Positive for sleep disturbance.        Objective   Vitals:    02/16/24 1510   BP: 122/80   Pulse: 79   Resp: 18   Temp: 97.8 °F (36.6 °C)   SpO2: 100%       Body mass index is 37.92 kg/m².    Physical Exam  Vitals and nursing note reviewed.   Constitutional:       General: She is not in acute distress.     Appearance: Normal appearance. She is well-developed.   HENT:      Head: Normocephalic and atraumatic.      Right Ear: External ear normal.      Left Ear: External ear normal.      Nose: Nose normal.    Eyes:      General: Lids are normal.      Conjunctiva/sclera: Conjunctivae normal.   Neck:      Vascular: No carotid bruit.   Cardiovascular:      Rate and Rhythm: Normal rate and regular rhythm.      Pulses: Normal pulses.      Heart sounds: Normal heart sounds. No murmur heard.     No friction rub. No gallop.   Pulmonary:      Effort: Pulmonary effort is normal. No respiratory distress.      Breath sounds: Normal breath sounds. No wheezing, rhonchi or rales.   Musculoskeletal:         General: No deformity.      Cervical back: Neck supple.   Skin:     General: Skin is warm and dry.   Neurological:      Mental Status: She is alert and oriented to person, place, and time. Mental status is at baseline.      Gait: Gait normal.   Psychiatric:         Speech: Speech normal.         Behavior: Behavior normal.         Thought Content: Thought content normal.       Assessment & Plan   Diagnoses and all orders for this visit:    1. Elevated blood pressure reading (Primary)  -     Comprehensive Metabolic Panel  -     Urinalysis With Culture If Indicated - Urine, Clean Catch  -     [place] Urine Culture Tube - Urine, Clean Catch  -     Urine Culture - Urine, Urine, Clean Catch  -     Urinalysis, Microscopic Only - Urine, Clean Catch    2. Localized edema  -     Comprehensive Metabolic Panel    3. Mixed hyperlipidemia  -     Comprehensive Metabolic Panel  -     CK  -     Lipid Panel With LDL / HDL Ratio    4. Prediabetes  -     Comprehensive Metabolic Panel  -     Hemoglobin A1c  -     TSH  -     T4, free  -     T3, Free  -     Uric Acid    5. Vitamin D deficiency  -     Comprehensive Metabolic Panel  -     Vitamin D,25-Hydroxy    6. B12 deficiency  -     CBC & Differential  -     Vitamin B12 & Folate    7. Biotin deficiency disease  -     CBC & Differential  -     Vitamin B7 (Biotin)    8. Iron deficiency  -     CBC & Differential  -     Iron and TIBC  -     Ferritin    9. Leukocytosis, unspecified type  -     CBC &  Differential    10. Lymphocytosis  -     CBC & Differential    11. Restless leg syndrome, uncontrolled  -     CBC & Differential  -     Comprehensive Metabolic Panel  -     Iron and TIBC  -     Ferritin  -     Vitamin B12 & Folate  -     TSH  -     T4, free  -     T3, Free  -     Vitamin B7 (Biotin)    12. Disturbance in sleep behavior    13. Daytime somnolence    14. Fatigue, unspecified type    15. Sleep disturbance    16. Shift work sleep disorder    17. Mixed anxiety depressive disorder  -     TSH  -     T4, free  -     T3, Free    18. Allergic rhinitis, unspecified seasonality, unspecified trigger    19. Increased risk of breast cancer    20. Breast pain    21. Positive JAKOB (antinuclear antibody)  -     Uric Acid    22. Inflammation of hand joint, unspecified laterality  -     Uric Acid    23. Multiple joint pain  -     Uric Acid    24. Bilateral foot pain    25. Chronic pain of both knees    26. Chronic left-sided low back pain without sciatica    27. Numbness and tingling in right hand    28. Preop cardiovascular exam  -     Ambulatory Referral to Cardiology    29. Precordial pain  -     Ambulatory Referral to Cardiology    30. Palpitations  -     Ambulatory Referral to Cardiology          Assessment and plan  Patient will have fasting labs. Call if no results in 1 week. Stability of conditions, plan, follow up, and further recommendations pending labs. Follow up in 3-6 months pending labs.     Ganglion foot- Patient is to have foot surgery that requires medical clearance. I advised she will have to follow up with cardiology and complete testing as previously ordered since she did not have testing and did not follow up as directed. I will refer back today. Once cleared by cardiology, she will be cleared by me.     Weight gain- Patient wanted to consider medication for weight loss. She has had palpitations, chest pain, and labile BP. I would not recommend Phentermine or stimulant. She previously did not  tolerate Wellbutrin, so I would not consider Contrave. However, she has prediabetes in the past and is overdue for follow up of this and fasting labs. She should complete fasting labs and could consider GLP-1 if continued prediabetes. She should have stress echo as ordered by cardiology, and if normal, she should increase exercise to 45 minutes, 5-6 days per week and ensure healthy diet- low fat and low carb.   Elevated blood pressure-Blood pressure is stable today.  She had normal readings then borderline readings with some elevated BP.  Continue HCTZ 12.5 mg daily. She should call if elevated blood pressure or heart rate and we can consider low-dose beta-blocker.  Episodes could be related to underlying sleep apnea, however, she has not seen sleep medicine as referred.  She should schedule appointment, as this could contribute to her BP and palpitations as well as weight.   Edema- I discussed with patient that PRN use of diuretics is not common and that edema is either a sign of underlying CHF or other chronic conditions or is related to lifestyle, diet, and exercise. I recommend Compression stockings, decrease sugars, starches, and salts, increase water, and ensure proper exercise daily (once cleared by cardiology). I discussed the need to see sleep medicine. Consider daily HCTZ 12.5 mg rather than PRN if elevated BP and persistent edema with use of compression stockings, negative stress echo and no cardiac etiology per specialists, proper diet, and negative sleep study.   Mixed hyperlipidemia- Lipids have remained uncontrolled with elevated TG and LDL.  I advise she start Lipitor 20 mg nightly 4/2023.  She did not follow-up for repeat labs or follow-up of medication.  We will need to consider medication pending labs. She will need work on diet, have stress echo per cardiology, and increase exercise if cleared by cardiology.  Prediabetes- A1C remained elevated at 6.4% 4/2023. She did not return at 3 month follow  up. I will consider GLP-1 if persistently elevated with weight gain, elevated TG, and prediabetes.     Vitamin D deficiency- Check dosage and let me know. Dosing recommendations pending labs.   B12 deficiency- Patient to check current dosage. Await labs for further recommendations.   Biotin deficiency- Biotin supplement recommended 2/2021. I will continue to monitor.   Iron deficiency-I will recheck and make further recommendations.   Leukocytosis-This was thought to be from smoking history, however, I wanted hematology consultation to ensure no further workup or treatment is needed. Additional labs and thought to be reactive, likely from smoking.    Left chest pressure and dyspnea on exertion-EKG without acute changes. She still needs to complete stress echo as ordered. Patient to call to schedule this. She should also follow up with cardiology.  RLS- Symptoms are stable. Continue Requip 1 mg once daily as needed. I have referred to sleep medicine for sleep study for formal diagnosis. She did not go for appt as scheduled and should reschedule.  She was referred again and did not go for appointment.  We will need to have her seen by a sleep medicine or we will no longer be able to prescribe her medication.  Fatigue- Labs with vitamin B12, B7, and vitamin D deficiency as well as slightly low iron. She was advised to supplements as directed.  With snoring and fatigue, she should also see sleep medicine as referred twice.  Shift work sleep disturbance- She does need to see sleep medicine. With abnormal sleep schedule from work schedule change, she was advised she could use Trazodone as needed.  However, this needs to be determined by sleep medicine.   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  She had improvement on Prozac 20 mg daily and previously failed higher doses due to side effects. She then  weaned off and does not feel she needs medication at this time. Consider follow up if worsening moods or the need for medication. We could also consider Pristiq with perimenopausal symptoms.   Allergic rhinitis- Continue Flonase 2 sprays in each nostril once daily and Claritin or Zyrtec 10 mg once daily as needed.   Increased risk for breast cancer- Patient to continue follow-up with high risk breast specialist and OB/GYN as directed by them and ensure screening testing and treatment as recommended.  Positive JAKOB, hand inflammation on ultrasound, multiple joint pain- She was seen by rheumatology who discussed possible treatment with Plaquenil.  Patient did not want to treat at that time.  Ensure follow-up as directed by rheumatology.  Low back pain with sciatica- Flexeril is working well and only used occasionally.  Follow-up if worsening pain, new or changing symptoms.  Need for colon cancer screening- Patient previously had Cologuard ordered but did not complete. She has FHx colon cancer and precancerous polyps. She should contact Dr Fitzpatrick for colonoscopy, as she is already established with them.     From CPE 3/2021- Small subcutaneous lesion without irritation but otherwise normal exam. She needs updated GYN exam, PAP, mammogram, colonoscopy, and should consider Covid 19 injection. She has upcoming appt GYN 7/2021 and was referred for mammogram and colonoscopy.    Patient has seen specialists as noted above.  I have reviewed available records, including consult notes, labs, and imaging/testing.  Patient to follow-up with specialists as directed by them.    I spent 30 minutes caring for Mary Banda on this date of service. This time includes time spent by me in the following activities as necessary: preparing for the visit, reviewing tests, specialists records and previous visits, obtaining and/or reviewing a separately obtained history, performing a medically appropriate exam and/or evaluation, counseling and  educating the patient, family, caregiver, referring and/or communicating with other healthcare professionals, documenting information in the medical record, independently interpreting results and communicating that information with the patient, family, caregiver, and developing a medically appropriate treatment plan with consideration of other conditions, medications, and treatments.

## 2024-02-19 ENCOUNTER — LAB (OUTPATIENT)
Dept: LAB | Facility: HOSPITAL | Age: 54
End: 2024-02-19
Payer: COMMERCIAL

## 2024-02-19 ENCOUNTER — TRANSCRIBE ORDERS (OUTPATIENT)
Dept: ADMINISTRATIVE | Facility: HOSPITAL | Age: 54
End: 2024-02-19
Payer: COMMERCIAL

## 2024-02-19 ENCOUNTER — HOSPITAL ENCOUNTER (OUTPATIENT)
Dept: CARDIOLOGY | Facility: HOSPITAL | Age: 54
Discharge: HOME | End: 2024-02-19
Payer: COMMERCIAL

## 2024-02-19 DIAGNOSIS — M67.472 GANGLION, LEFT ANKLE AND FOOT: ICD-10-CM

## 2024-02-19 DIAGNOSIS — Z01.818 PRE-OP EXAM: ICD-10-CM

## 2024-02-19 DIAGNOSIS — Z01.818 PRE-OP EXAM: Primary | ICD-10-CM

## 2024-02-19 LAB
25(OH)D3 SERPL-MCNC: 26.4 NG/ML (ref 30–100)
ALBUMIN SERPL-MCNC: 4.1 G/DL (ref 3.5–5.2)
ALBUMIN/GLOB SERPL: 1.2 G/DL
ALP SERPL-CCNC: 102 U/L (ref 39–117)
ALT SERPL W P-5'-P-CCNC: 32 U/L (ref 1–33)
ANION GAP SERPL CALCULATED.3IONS-SCNC: 10.8 MMOL/L (ref 5–15)
AST SERPL-CCNC: 19 U/L (ref 1–32)
BACTERIA UR QL AUTO: ABNORMAL /HPF
BASOPHILS # BLD AUTO: 0.08 10*3/MM3 (ref 0–0.2)
BASOPHILS NFR BLD AUTO: 0.7 % (ref 0–1.5)
BILIRUB SERPL-MCNC: 0.4 MG/DL (ref 0–1.2)
BILIRUB UR QL STRIP: NEGATIVE
BUN SERPL-MCNC: 13 MG/DL (ref 6–20)
BUN/CREAT SERPL: 14.8 (ref 7–25)
CALCIUM SPEC-SCNC: 9.4 MG/DL (ref 8.6–10.5)
CHLORIDE SERPL-SCNC: 99 MMOL/L (ref 98–107)
CHOLEST SERPL-MCNC: 224 MG/DL (ref 0–200)
CK SERPL-CCNC: 95 U/L (ref 20–180)
CLARITY UR: CLEAR
CO2 SERPL-SCNC: 26.2 MMOL/L (ref 22–29)
COLOR UR: YELLOW
CREAT SERPL-MCNC: 0.88 MG/DL (ref 0.57–1)
DEPRECATED RDW RBC AUTO: 42.1 FL (ref 37–54)
EGFRCR SERPLBLD CKD-EPI 2021: 79.7 ML/MIN/1.73
EOSINOPHIL # BLD AUTO: 0.18 10*3/MM3 (ref 0–0.4)
EOSINOPHIL NFR BLD AUTO: 1.6 % (ref 0.3–6.2)
ERYTHROCYTE [DISTWIDTH] IN BLOOD BY AUTOMATED COUNT: 13.6 % (ref 12.3–15.4)
FERRITIN SERPL-MCNC: 120 NG/ML (ref 13–150)
FOLATE SERPL-MCNC: >20 NG/ML (ref 4.78–24.2)
GLOBULIN UR ELPH-MCNC: 3.3 GM/DL
GLUCOSE SERPL-MCNC: 125 MG/DL (ref 65–99)
GLUCOSE UR STRIP-MCNC: NEGATIVE MG/DL
HBA1C MFR BLD: 6.7 % (ref 4.8–5.6)
HCT VFR BLD AUTO: 42.3 % (ref 34–46.6)
HDLC SERPL-MCNC: 44 MG/DL (ref 40–60)
HGB BLD-MCNC: 14 G/DL (ref 12–15.9)
HGB UR QL STRIP.AUTO: ABNORMAL
HOLD SPECIMEN: NORMAL
HOLD SPECIMEN: NORMAL
HYALINE CASTS UR QL AUTO: ABNORMAL /LPF
IMM GRANULOCYTES # BLD AUTO: 0.07 10*3/MM3 (ref 0–0.05)
IMM GRANULOCYTES NFR BLD AUTO: 0.6 % (ref 0–0.5)
IRON 24H UR-MRATE: 99 MCG/DL (ref 37–145)
IRON SATN MFR SERPL: 25 % (ref 20–50)
KETONES UR QL STRIP: NEGATIVE
LDLC SERPL CALC-MCNC: 143 MG/DL (ref 0–100)
LDLC/HDLC SERPL: 3.17 {RATIO}
LEUKOCYTE ESTERASE UR QL STRIP.AUTO: ABNORMAL
LYMPHOCYTES # BLD AUTO: 3.74 10*3/MM3 (ref 0.7–3.1)
LYMPHOCYTES NFR BLD AUTO: 33.4 % (ref 19.6–45.3)
MCH RBC QN AUTO: 28.6 PG (ref 26.6–33)
MCHC RBC AUTO-ENTMCNC: 33.1 G/DL (ref 31.5–35.7)
MCV RBC AUTO: 86.3 FL (ref 79–97)
MONOCYTES # BLD AUTO: 0.77 10*3/MM3 (ref 0.1–0.9)
MONOCYTES NFR BLD AUTO: 6.9 % (ref 5–12)
NEUTROPHILS NFR BLD AUTO: 56.8 % (ref 42.7–76)
NEUTROPHILS NFR BLD AUTO: 6.35 10*3/MM3 (ref 1.7–7)
NITRITE UR QL STRIP: NEGATIVE
NRBC BLD AUTO-RTO: 0 /100 WBC (ref 0–0.2)
PH UR STRIP.AUTO: 6.5 [PH] (ref 5–8)
PLATELET # BLD AUTO: 363 10*3/MM3 (ref 140–450)
PMV BLD AUTO: 10.3 FL (ref 6–12)
POTASSIUM SERPL-SCNC: 3.8 MMOL/L (ref 3.5–5.2)
PROT SERPL-MCNC: 7.4 G/DL (ref 6–8.5)
PROT UR QL STRIP: NEGATIVE
QT INTERVAL: 377 MS
QTC INTERVAL: 459 MS
RBC # BLD AUTO: 4.9 10*6/MM3 (ref 3.77–5.28)
RBC # UR STRIP: ABNORMAL /HPF
REF LAB TEST METHOD: ABNORMAL
SODIUM SERPL-SCNC: 136 MMOL/L (ref 136–145)
SP GR UR STRIP: 1.01 (ref 1–1.03)
SQUAMOUS #/AREA URNS HPF: ABNORMAL /HPF
T3FREE SERPL-MCNC: 3.53 PG/ML (ref 2–4.4)
T4 FREE SERPL-MCNC: 1.17 NG/DL (ref 0.93–1.7)
TIBC SERPL-MCNC: 392 MCG/DL (ref 298–536)
TRANSFERRIN SERPL-MCNC: 263 MG/DL (ref 200–360)
TRIGL SERPL-MCNC: 203 MG/DL (ref 0–150)
TSH SERPL DL<=0.05 MIU/L-ACNC: 2.41 UIU/ML (ref 0.27–4.2)
URATE SERPL-MCNC: 5.6 MG/DL (ref 2.4–5.7)
UROBILINOGEN UR QL STRIP: ABNORMAL
VIT B12 BLD-MCNC: 501 PG/ML (ref 211–946)
VLDLC SERPL-MCNC: 37 MG/DL (ref 5–40)
WBC # UR STRIP: ABNORMAL /HPF
WBC NRBC COR # BLD AUTO: 11.19 10*3/MM3 (ref 3.4–10.8)

## 2024-02-19 PROCEDURE — 84591 ASSAY OF NOS VITAMIN: CPT | Performed by: PHYSICIAN ASSISTANT

## 2024-02-19 PROCEDURE — 82550 ASSAY OF CK (CPK): CPT | Performed by: PHYSICIAN ASSISTANT

## 2024-02-19 PROCEDURE — 87077 CULTURE AEROBIC IDENTIFY: CPT | Performed by: PHYSICIAN ASSISTANT

## 2024-02-19 PROCEDURE — 80061 LIPID PANEL: CPT | Performed by: PHYSICIAN ASSISTANT

## 2024-02-19 PROCEDURE — 82746 ASSAY OF FOLIC ACID SERUM: CPT | Performed by: PHYSICIAN ASSISTANT

## 2024-02-19 PROCEDURE — 82607 VITAMIN B-12: CPT | Performed by: PHYSICIAN ASSISTANT

## 2024-02-19 PROCEDURE — 84466 ASSAY OF TRANSFERRIN: CPT | Performed by: PHYSICIAN ASSISTANT

## 2024-02-19 PROCEDURE — 93010 ELECTROCARDIOGRAM REPORT: CPT | Performed by: INTERNAL MEDICINE

## 2024-02-19 PROCEDURE — 80050 GENERAL HEALTH PANEL: CPT | Performed by: PHYSICIAN ASSISTANT

## 2024-02-19 PROCEDURE — 93005 ELECTROCARDIOGRAM TRACING: CPT | Performed by: PODIATRIST

## 2024-02-19 PROCEDURE — 82306 VITAMIN D 25 HYDROXY: CPT | Performed by: PHYSICIAN ASSISTANT

## 2024-02-19 PROCEDURE — 81001 URINALYSIS AUTO W/SCOPE: CPT | Performed by: PHYSICIAN ASSISTANT

## 2024-02-19 PROCEDURE — 87186 SC STD MICRODIL/AGAR DIL: CPT | Performed by: PHYSICIAN ASSISTANT

## 2024-02-19 PROCEDURE — 83036 HEMOGLOBIN GLYCOSYLATED A1C: CPT | Performed by: PHYSICIAN ASSISTANT

## 2024-02-19 PROCEDURE — 84439 ASSAY OF FREE THYROXINE: CPT | Performed by: PHYSICIAN ASSISTANT

## 2024-02-19 PROCEDURE — 87086 URINE CULTURE/COLONY COUNT: CPT | Performed by: PHYSICIAN ASSISTANT

## 2024-02-19 PROCEDURE — 83540 ASSAY OF IRON: CPT | Performed by: PHYSICIAN ASSISTANT

## 2024-02-19 PROCEDURE — 84481 FREE ASSAY (FT-3): CPT | Performed by: PHYSICIAN ASSISTANT

## 2024-02-19 PROCEDURE — 84550 ASSAY OF BLOOD/URIC ACID: CPT | Performed by: PHYSICIAN ASSISTANT

## 2024-02-19 PROCEDURE — 82728 ASSAY OF FERRITIN: CPT | Performed by: PHYSICIAN ASSISTANT

## 2024-02-19 PROCEDURE — 36415 COLL VENOUS BLD VENIPUNCTURE: CPT | Performed by: PHYSICIAN ASSISTANT

## 2024-02-21 ENCOUNTER — TELEPHONE (OUTPATIENT)
Dept: FAMILY MEDICINE CLINIC | Facility: CLINIC | Age: 54
End: 2024-02-21

## 2024-02-21 LAB — BACTERIA SPEC AEROBE CULT: ABNORMAL

## 2024-02-21 NOTE — TELEPHONE ENCOUNTER
She was to have clearance from cardiology. They called her 2/19/2024- I do not see that she scheduled cardiology appt. Please check on this.

## 2024-03-01 LAB — BIOTIN SERPL-MCNC: 0.06 NG/ML (ref 0.05–0.83)

## 2024-03-21 DIAGNOSIS — E78.2 MIXED HYPERLIPIDEMIA: ICD-10-CM

## 2024-03-21 RX ORDER — ATORVASTATIN CALCIUM 20 MG/1
20 TABLET, FILM COATED ORAL NIGHTLY
Qty: 90 TABLET | Refills: 0 | Status: SHIPPED | OUTPATIENT
Start: 2024-03-21

## 2024-04-08 NOTE — PROGRESS NOTES
Date of Office Visit: 2024  Encounter Provider: Carmen Horn MD  Place of Service: [facility] CARDIOLOGY  Patient Name: Mary Banda  :1970    Chief complaint  Chest pain shortness of breath, palpitations, history of atrial tachycardia    History of Present Illness  Patient is a 53-year-old female with history of anxiety palpitations irritable bowel syndrome who was seen in 2014 for chest pain a stress echocardiogram showed no ischemia with normal systolic function no significant valvular heart disease.  She was seen in 3/2019 for palpitations and chest pain.  CT angiogram of the chest showed no pulmonary emboli with groundglass infiltrate right lower lobe pulmonary nodules.  Aorta was normal in caliber she had a follow-up CT scan 2020 that showed resolution of the filtrate/nodule.  An echocardiogram showed normal systolic function normal diastolic function no significant valvular heart disease.  A 48-hour monitor was relatively benign with one 13 beat episode of atrial tachycardia.  A treadmill exercise stress test was negative for ischemia at 9 METS.  In  patient complained of exertional dyspnea and chest pain.  Stress echo was ordered but she did not complete this.    Since last visit patient's had no palpitations dizziness chest pain.  Dyspnea on exertion is unchanged however has had more edema.  She had been instructed to take metformin for her diabetes however developed significant myalgias after 1 dose and has not utilized this.  However she has made significant lifestyle changes and lost 9 pounds intentionally.  She is also developing edema and started on hydrochlorothiazide a month ago with improvement in edema.  She is running out of these prescriptions.  Patient declines repeat evaluation for sleep apnea.      Past Medical History:   Diagnosis Date    Abnormal CT of the chest 2019    Recheck CT chest 2020 with resolution of abnormality and no new  abnormality    Allergic Years    Sulfa    Anxiety     Arthritis     Breast nodule     RIGHT    COVID 12/21/2021    DDD (degenerative disc disease), lumbar     Gallstones     H/O Anxiety     Heart palpitations     AT TIMES    IBS (irritable bowel syndrome)     Low back pain     PONV (postoperative nausea and vomiting)     Seasonal allergies     Visual impairment Couple of months    Vision getting worse, eyelids drooping     Past Surgical History:   Procedure Laterality Date    BREAST BIOPSY Right 8/9/2017    Procedure: RIGHT BREAST BIOPSY WITH NEEDLE LOCALIZATION ;  Surgeon: Enrico Emerson MD;  Location: [facility] OR;  Service:     BREAST BIOPSY Right 03/2017    U/S guided bx- non-atypical ductal hyperplasia    CHOLECYSTECTOMY  2002    ENDOMETRIAL ABLATION W/ NOVASURE  2006    OOPHORECTOMY Left 1994    Part of left ovary removed    TUBAL ABDOMINAL LIGATION  1995     Outpatient Medications Prior to Visit   Medication Sig Dispense Refill    atorvastatin (Lipitor) 20 MG tablet Take 1 tablet by mouth Every Night. 90 tablet 0    hydroCHLOROthiazide (MICROZIDE) 12.5 MG capsule Take 1 capsule by mouth Daily. 30 capsule 0    ibuprofen (ADVIL,MOTRIN) 400 MG tablet Take 1 tablet by mouth Every 6 (Six) Hours As Needed for Mild Pain.      fluticasone (FLONASE) 50 MCG/ACT nasal spray 2 sprays into the nostril(s) as directed by provider Daily for 30 days. Administer 2 sprays in each nostril for each dose. 16 g 0    metFORMIN (GLUCOPHAGE) 500 MG tablet Take 1 tablet by mouth 2 (Two) Times a Day With Meals. 180 tablet 0     No facility-administered medications prior to visit.       Allergies as of 04/09/2024 - Reviewed 04/09/2024   Allergen Reaction Noted    Sulfa antibiotics Rash 06/28/2016     Social History     Socioeconomic History    Marital status:      Spouse name: Ty    Years of education: College   Tobacco Use    Smoking status: Every Day     Current packs/day: 1.00     Average packs/day: 1 pack/day for  33.3 years (33.3 ttl pk-yrs)     Types: Cigarettes     Start date: 1/1/1991     Passive exposure: Current    Smokeless tobacco: Never    Tobacco comments:     Caffeine - 3-4 diet pepsi   Vaping Use    Vaping status: Never Used   Substance and Sexual Activity    Alcohol use: Not Currently     Comment: social    Drug use: Never    Sexual activity: Yes     Partners: Male     Birth control/protection: Surgical     Comment: Tubal ligation     Family History   Problem Relation Age of Onset    Stroke Father     Hyperlipidemia Father     Heart disease Father     Diabetes Father     Heart attack Father     Arthritis Father     Thyroid disease Mother     Heart disease Mother     Depression Mother     Hypothyroidism Mother     Arthritis Mother     Alcohol abuse Mother     Lupus Sister     Aneurysm Sister     Heart disease Sister     Cerebral aneurysm Sister     Early death Sister         Brain stem aneurysm    Breast cancer Sister     Depression Daughter     Hypertension Daughter     Hypothyroidism Daughter     Heart disease Paternal Grandmother     Lung cancer Paternal Grandmother     Heart disease Maternal Grandmother     Alcohol abuse Maternal Grandmother     Lung cancer Maternal Grandmother     Stroke Maternal Grandmother     Heart disease Maternal Grandfather     Heart attack Maternal Grandfather     Diabetes Paternal Aunt     Diabetes Paternal Uncle     Breast cancer Other     Ovarian cancer Neg Hx     Uterine cancer Neg Hx     Colon cancer Neg Hx      Review of Systems   Constitutional: Negative for chills, fever, weight gain and weight loss.   Cardiovascular:  Positive for leg swelling.   Respiratory:  Positive for snoring. Negative for cough and wheezing.    Hematologic/Lymphatic: Negative for bleeding problem. Does not bruise/bleed easily.   Skin:  Negative for color change.   Musculoskeletal:  Positive for falls. Negative for joint pain and myalgias.   Gastrointestinal:  Negative for melena.   Genitourinary:   "Negative for hematuria.   Neurological:  Positive for excessive daytime sleepiness.   Psychiatric/Behavioral:  Negative for depression. The patient is nervous/anxious.         Objective:     Vitals:    04/09/24 0736   BP: 112/84   Pulse: 87   SpO2: 100%   Weight: 94.3 kg (208 lb)   Height: 160 cm (62.99\")     Body mass index is 36.86 kg/m².    Vitals reviewed.   Constitutional:       Appearance: Well-developed. Obese.   Eyes:      General: No scleral icterus.        Right eye: No discharge.      Conjunctiva/sclera: Conjunctivae normal.      Pupils: Pupils are equal, round, and reactive to light.   HENT:      Head: Normocephalic.      Nose: Nose normal.   Neck:      Thyroid: No thyromegaly.      Vascular: No JVD.   Pulmonary:      Effort: Pulmonary effort is normal. No respiratory distress.      Breath sounds: Normal breath sounds. No wheezing. No rales.   Cardiovascular:      Normal rate. Regular rhythm. Normal S1. Normal S2.       Murmurs: There is no murmur.      No gallop.    Pulses:     Intact distal pulses.      Carotid: 2+ bilaterally.     Radial: 2+ bilaterally.     Femoral: 2+ bilaterally.     Popliteal: 2+ bilaterally.     Dorsalis pedis: 2+ bilaterally.     Posterior tibial: 2+ bilaterally.  Edema:     Peripheral edema absent.   Abdominal:      General: Bowel sounds are normal. There is no distension.      Palpations: Abdomen is soft.      Tenderness: There is no abdominal tenderness. There is no rebound.   Musculoskeletal: Normal range of motion.         General: No tenderness.      Cervical back: Normal range of motion and neck supple. Skin:     General: Skin is warm and dry.      Findings: No erythema or rash.   Neurological:      Mental Status: Alert and oriented to person, place, and time.   Psychiatric:         Behavior: Behavior normal.         Thought Content: Thought content normal.         Judgment: Judgment normal.       Lab Review:   Lab Results - Last 18 Months   Lab Units 02/19/24  0902 " 04/30/23  1242 04/18/23  1002   WBC 10*3/mm3 11.19* 10.83* 12.4*   RBC 10*6/mm3 4.90 5.35* 4.83   HEMOGLOBIN g/dL 14.0 15.4 13.8   HEMATOCRIT % 42.3 44.5 40.6   MCV fL 86.3 83.2 84   MCH pg 28.6 28.8 28.6   MCHC g/dL 33.1 34.6 34.0   RDW % 13.6 13.2 13.6   PLATELETS 10*3/mm3 363 399 368   NEUTROPHIL % % 56.8 55.2 54   LYMPHOCYTE % % 33.4 35.0 35   MONOCYTES % % 6.9 7.7 7   EOSINOPHIL % % 1.6 1.1 2   BASOPHIL % % 0.7 0.6 1   NEUTROS ABS 10*3/mm3 6.35 5.98 6.7   LYMPHS ABS 10*3/mm3 3.74* 3.79* 4.3*   MONOS ABS 10*3/mm3 0.77 0.83 0.9   EOS ABS 10*3/mm3 0.18 0.12 0.2   BASOS ABS 10*3/mm3 0.08 0.07 0.1   IMM GRAN % %  --   --  1   IMMATURE GRANS (ABS) x10E3/uL  --   --  0.1   RDW-SD fl 42.1 40.3  --    MPV fL 10.3 10.0  --        Lab Results - Last 18 Months   Lab Units 02/19/24  0902 04/30/23  1242   GLUCOSE mg/dL 125* 115*   BUN mg/dL 13 13   CREATININE mg/dL 0.88 0.94   SODIUM mmol/L 136 137   POTASSIUM mmol/L 3.8 3.8   CHLORIDE mmol/L 99 100   CO2 mmol/L 26.2 24.9   CALCIUM mg/dL 9.4 10.0   TOTAL PROTEIN g/dL 7.4 7.8   ALBUMIN g/dL 4.1 4.2   ALT (SGPT) U/L 32 27   AST (SGOT) U/L 19 13   ALK PHOS U/L 102 99   BILIRUBIN mg/dL 0.4 0.5   GLOBULIN gm/dL 3.3 3.6   A/G RATIO g/dL 1.2 1.2   BUN / CREAT RATIO  14.8 13.8   ANION GAP mmol/L 10.8 12.1   EGFR mL/min/1.73 79.7 74.1     Lab Results - Last 18 Months   Lab Units 02/19/24  0902 04/18/23  1002   CHOLESTEROL mg/dL 224*  --    TRIGLYCERIDES mg/dL 203* 199*   HDL CHOL mg/dL 44 46   LDL CHOL mg/dL 143* 158*   VLDL CHOL mg/dL 37  --    VLDL CHOLESTEROL CORA mg/dL  --  37   LDL/HDL RATIO  3.17 3.4*       Lab Results - Last 18 Months   Lab Units 04/30/23  1242   HSTROP T ng/L <6     Lab Results - Last 18 Months   Lab Units 02/19/24  0902 04/30/23  1242   TSH uIU/mL 2.410 2.150         ECG 12 Lead    Date/Time: 4/9/2024 7:49 AM  Performed by: Carmen Horn MD    Authorized by: Carmen Horn MD  Comparison: compared with previous ECG   Comparison to previous ECG: Nonspecific ST T  wave changes present  Rhythm: sinus rhythm    Clinical impression: abnormal EKG        Assessment:       Diagnosis Plan   1. Snoring  ECG 12 Lead    Home Sleep Study      2. Daytime somnolence  ECG 12 Lead    Home Sleep Study      3. [place] (dyspnea on exertion)  ECG 12 Lead    Adult Stress Echo W/ Cont or Stress Agent if Necessary Per Protocol      4. Abnormal EKG  Adult Stress Echo W/ Cont or Stress Agent if Necessary Per Protocol        Plan:       1.  Dyspnea on exertion.  This is progressively more noticeable.  Given with multiple risk factors we will once again request a stress echocardiogram.  Of note she also has nonspecific ST-T wave changes that are slightly more pronounced on today's EKG.    2.  Elevated blood pressure.  Controlled  3.  Hyperlipidemia.  Poorly controlled and now on statin therapy  4.  Atrial tachycardia, brief episodes in the past.  Remains asymptomatic  5.  Elevated glucose and probable prediabetes.  As above  6.  Edema. Improved on HCTZ for the past month.  Minimal on exam but she will continue with this for now as it seems to be helping with blood pressure control.  If blood sugars escalate this may need to be redressed.  Await echo results to determine additional drug regimen change.  7.  Diabetes, on metformin though have struggling with this with abdominal pain and cramps.  She has a call out to Dr. Felipe.  8.  Probable sleep apnea with Snoring and daytime somolence. Check home sleep study      Time Spent: I spent 35 minutes caring for Mary on this date of service. This time includes time spent by me in the following activities: preparing for the visit, reviewing tests, obtaining and/or reviewing a separately obtained history, performing a medically appropriate examination and/or evaluation, counseling and educating the patient/family/caregiver, ordering medications, tests, or procedures, documenting information in the medical record, and independently interpreting results and  communicating that information with the patient/family/caregiver.   I spent 1 minutes on the separately reported service of ECG. This time is not included in the time used to support the E/M service also reported today.        Your medication list            Accurate as of April 9, 2024  7:42 PM. If you have any questions, ask your nurse or doctor.                CONTINUE taking these medications        Instructions Last Dose Given Next Dose Due   atorvastatin 20 MG tablet  Commonly known as: Lipitor      Take 1 tablet by mouth Every Night.       fluticasone 50 MCG/ACT nasal spray  Commonly known as: FLONASE      2 sprays into the nostril(s) as directed by provider Daily for 30 days. Administer 2 sprays in each nostril for each dose.       hydroCHLOROthiazide 12.5 MG capsule  Commonly known as: MICROZIDE      Take 1 capsule by mouth Daily.       ibuprofen 400 MG tablet  Commonly known as: ADVIL,MOTRIN      Take 1 tablet by mouth Every 6 (Six) Hours As Needed for Mild Pain.       metFORMIN 500 MG tablet  Commonly known as: GLUCOPHAGE      Take 1 tablet by mouth 2 (Two) Times a Day With Meals.                Patient is no longer taking -.  I corrected the med list to reflect this.  I did not stop these medications.      Dictated utilizing Dragon dictation

## 2024-04-09 ENCOUNTER — TRANSCRIBE ORDERS (OUTPATIENT)
Dept: ADMINISTRATIVE | Facility: HOSPITAL | Age: 54
End: 2024-04-09
Payer: COMMERCIAL

## 2024-04-09 ENCOUNTER — OFFICE VISIT (OUTPATIENT)
Dept: CARDIOLOGY | Facility: CLINIC | Age: 54
End: 2024-04-09
Payer: COMMERCIAL

## 2024-04-09 VITALS
WEIGHT: 208 LBS | BODY MASS INDEX: 36.86 KG/M2 | HEART RATE: 87 BPM | SYSTOLIC BLOOD PRESSURE: 112 MMHG | DIASTOLIC BLOOD PRESSURE: 84 MMHG | OXYGEN SATURATION: 100 % | HEIGHT: 63 IN

## 2024-04-09 DIAGNOSIS — R06.09 DOE (DYSPNEA ON EXERTION): ICD-10-CM

## 2024-04-09 DIAGNOSIS — R40.0 DAYTIME SOMNOLENCE: ICD-10-CM

## 2024-04-09 DIAGNOSIS — Z12.31 SCREENING MAMMOGRAM FOR BREAST CANCER: Primary | ICD-10-CM

## 2024-04-09 DIAGNOSIS — R94.31 ABNORMAL EKG: ICD-10-CM

## 2024-04-09 DIAGNOSIS — R06.83 SNORING: Primary | ICD-10-CM

## 2024-04-09 PROCEDURE — 99214 OFFICE O/P EST MOD 30 MIN: CPT | Performed by: INTERNAL MEDICINE

## 2024-04-09 PROCEDURE — 93000 ELECTROCARDIOGRAM COMPLETE: CPT | Performed by: INTERNAL MEDICINE

## 2024-04-11 DIAGNOSIS — R03.0 ELEVATED BLOOD PRESSURE READING: ICD-10-CM

## 2024-04-11 RX ORDER — HYDROCHLOROTHIAZIDE 12.5 MG/1
12.5 CAPSULE, GELATIN COATED ORAL DAILY
Qty: 30 CAPSULE | Refills: 0 | Status: SHIPPED | OUTPATIENT
Start: 2024-04-11

## 2024-04-11 NOTE — TELEPHONE ENCOUNTER
Please see result note.  Patient is to follow-up with me the middle of May for her 3-month follow-up.  Please schedule her for follow-up with me.

## 2024-04-22 ENCOUNTER — TELEPHONE (OUTPATIENT)
Dept: CARDIOLOGY | Facility: CLINIC | Age: 54
End: 2024-04-22
Payer: COMMERCIAL

## 2024-04-23 ENCOUNTER — HOSPITAL ENCOUNTER (OUTPATIENT)
Dept: SLEEP MEDICINE | Facility: HOSPITAL | Age: 54
End: 2024-04-23
Payer: COMMERCIAL

## 2024-04-23 ENCOUNTER — HOSPITAL ENCOUNTER (OUTPATIENT)
Dept: CARDIOLOGY | Facility: HOSPITAL | Age: 54
Discharge: HOME | End: 2024-04-23
Payer: COMMERCIAL

## 2024-04-23 ENCOUNTER — TELEPHONE (OUTPATIENT)
Dept: CARDIOLOGY | Facility: CLINIC | Age: 54
End: 2024-04-23
Payer: COMMERCIAL

## 2024-04-23 VITALS
WEIGHT: 208 LBS | BODY MASS INDEX: 38.28 KG/M2 | DIASTOLIC BLOOD PRESSURE: 86 MMHG | HEIGHT: 62 IN | SYSTOLIC BLOOD PRESSURE: 134 MMHG

## 2024-04-23 DIAGNOSIS — R94.31 ABNORMAL EKG: ICD-10-CM

## 2024-04-23 DIAGNOSIS — R06.83 SNORING: ICD-10-CM

## 2024-04-23 DIAGNOSIS — R06.09 DOE (DYSPNEA ON EXERTION): ICD-10-CM

## 2024-04-23 DIAGNOSIS — R40.0 DAYTIME SOMNOLENCE: ICD-10-CM

## 2024-04-23 LAB
AORTIC ARCH: 2.2 CM
AORTIC DIMENSIONLESS INDEX: 0.9 (DI)
ASCENDING AORTA: 2.9 CM
BH CV ECHO MEAS - ACS: 1.61 CM
BH CV ECHO MEAS - AO MAX PG: 4.5 MMHG
BH CV ECHO MEAS - AO MEAN PG: 3 MMHG
BH CV ECHO MEAS - AO ROOT DIAM: 3 CM
BH CV ECHO MEAS - AO V2 MAX: 106 CM/SEC
BH CV ECHO MEAS - AO V2 VTI: 24.3 CM
BH CV ECHO MEAS - AVA(I,D): 2.6 CM2
BH CV ECHO MEAS - EDV(CUBED): 110.6 ML
BH CV ECHO MEAS - EDV(MOD-SP2): 129 ML
BH CV ECHO MEAS - EDV(MOD-SP4): 114 ML
BH CV ECHO MEAS - EF(MOD-BP): 63.8 %
BH CV ECHO MEAS - EF(MOD-SP2): 64.3 %
BH CV ECHO MEAS - EF(MOD-SP4): 64.9 %
BH CV ECHO MEAS - ESV(CUBED): 28.7 ML
BH CV ECHO MEAS - ESV(MOD-SP2): 46 ML
BH CV ECHO MEAS - ESV(MOD-SP4): 40 ML
BH CV ECHO MEAS - FS: 36.2 %
BH CV ECHO MEAS - IVS/LVPW: 1.25 CM
BH CV ECHO MEAS - IVSD: 1 CM
BH CV ECHO MEAS - LAT PEAK E' VEL: 8.8 CM/SEC
BH CV ECHO MEAS - LV DIASTOLIC VOL/BSA (35-75): 58.7 CM2
BH CV ECHO MEAS - LV MASS(C)D: 147.8 GRAMS
BH CV ECHO MEAS - LV MAX PG: 4.7 MMHG
BH CV ECHO MEAS - LV MEAN PG: 2 MMHG
BH CV ECHO MEAS - LV SYSTOLIC VOL/BSA (12-30): 20.6 CM2
BH CV ECHO MEAS - LV V1 MAX: 108 CM/SEC
BH CV ECHO MEAS - LV V1 VTI: 20.9 CM
BH CV ECHO MEAS - LVIDD: 4.8 CM
BH CV ECHO MEAS - LVIDS: 3.1 CM
BH CV ECHO MEAS - LVOT AREA: 3 CM2
BH CV ECHO MEAS - LVOT DIAM: 1.97 CM
BH CV ECHO MEAS - LVPWD: 0.8 CM
BH CV ECHO MEAS - MED PEAK E' VEL: 7.3 CM/SEC
BH CV ECHO MEAS - MV A MAX VEL: 103 CM/SEC
BH CV ECHO MEAS - MV DEC SLOPE: 398.6 CM/SEC2
BH CV ECHO MEAS - MV DEC TIME: 0.24 SEC
BH CV ECHO MEAS - MV E MAX VEL: 67 CM/SEC
BH CV ECHO MEAS - MV E/A: 0.65
BH CV ECHO MEAS - MV MAX PG: 3.1 MMHG
BH CV ECHO MEAS - MV MEAN PG: 1.29 MMHG
BH CV ECHO MEAS - MV P1/2T: 67.3 MSEC
BH CV ECHO MEAS - MV V2 VTI: 25.4 CM
BH CV ECHO MEAS - MVA(P1/2T): 3.3 CM2
BH CV ECHO MEAS - MVA(VTI): 2.5 CM2
BH CV ECHO MEAS - PA ACC TIME: 0.14 SEC
BH CV ECHO MEAS - PA V2 MAX: 97.7 CM/SEC
BH CV ECHO MEAS - PULM A REVS DUR: 0.07 SEC
BH CV ECHO MEAS - PULM A REVS VEL: 32.5 CM/SEC
BH CV ECHO MEAS - PULM DIAS VEL: 39.3 CM/SEC
BH CV ECHO MEAS - PULM S/D: 1.31
BH CV ECHO MEAS - PULM SYS VEL: 51.4 CM/SEC
BH CV ECHO MEAS - QP/QS: 0.76
BH CV ECHO MEAS - RAP SYSTOLE: 3 MMHG
BH CV ECHO MEAS - RV MAX PG: 2.22 MMHG
BH CV ECHO MEAS - RV V1 MAX: 74.4 CM/SEC
BH CV ECHO MEAS - RV V1 VTI: 16.2 CM
BH CV ECHO MEAS - RVOT DIAM: 1.96 CM
BH CV ECHO MEAS - RVSP: 8 MMHG
BH CV ECHO MEAS - SUP REN AO DIAM: 1.9 CM
BH CV ECHO MEAS - SV(LVOT): 63.7 ML
BH CV ECHO MEAS - SV(MOD-SP2): 83 ML
BH CV ECHO MEAS - SV(MOD-SP4): 74 ML
BH CV ECHO MEAS - SV(RVOT): 48.7 ML
BH CV ECHO MEAS - SVI(LVOT): 32.8 ML/M2
BH CV ECHO MEAS - SVI(MOD-SP2): 42.7 ML/M2
BH CV ECHO MEAS - SVI(MOD-SP4): 38.1 ML/M2
BH CV ECHO MEAS - TAPSE (>1.6): 1.79 CM
BH CV ECHO MEAS - TR MAX PG: 4.8 MMHG
BH CV ECHO MEAS - TR MAX VEL: 109.6 CM/SEC
BH CV ECHO MEASUREMENTS AVERAGE E/E' RATIO: 8.32
BH CV STRESS BP STAGE 1: NORMAL
BH CV STRESS BP STAGE 2: NORMAL
BH CV STRESS DURATION MIN STAGE 1: 3
BH CV STRESS DURATION MIN STAGE 2: 3
BH CV STRESS DURATION SEC STAGE 1: 0
BH CV STRESS DURATION SEC STAGE 2: 0
BH CV STRESS ECHO POST STRESS EJECTION FRACTION EF: 77 %
BH CV STRESS GRADE STAGE 1: 10
BH CV STRESS GRADE STAGE 2: 12
BH CV STRESS HR STAGE 1: 138
BH CV STRESS HR STAGE 2: 164
BH CV STRESS METS STAGE 1: 5
BH CV STRESS METS STAGE 2: 7.5
BH CV STRESS PROTOCOL 1: NORMAL
BH CV STRESS SPEED STAGE 1: 1.7
BH CV STRESS SPEED STAGE 2: 2.5
BH CV STRESS STAGE 1: 1
BH CV STRESS STAGE 2: 2
BH CV XLRA - RV BASE: 3.1 CM
BH CV XLRA - RV LENGTH: 6.8 CM
BH CV XLRA - RV MID: 2.8 CM
BH CV XLRA - TDI S': 11.9 CM/SEC
LEFT ATRIUM VOLUME INDEX: 18.2 ML/M2
MAXIMAL PREDICTED HEART RATE: 169 BPM
PERCENT MAX PREDICTED HR: 97.04 %
SINUS: 2.8 CM
STJ: 2.6 CM
STRESS BASELINE BP: NORMAL MMHG
STRESS BASELINE HR: 71 BPM
STRESS PERCENT HR: 114 %
STRESS POST ESTIMATED WORKLOAD: 7.5 METS
STRESS POST EXERCISE DUR MIN: 6 MIN
STRESS POST EXERCISE DUR SEC: 0 SEC
STRESS POST PEAK BP: NORMAL MMHG
STRESS POST PEAK HR: 164 BPM
STRESS TARGET HR: 144 BPM

## 2024-04-23 PROCEDURE — 93320 DOPPLER ECHO COMPLETE: CPT | Performed by: INTERNAL MEDICINE

## 2024-04-23 PROCEDURE — 93350 STRESS TTE ONLY: CPT

## 2024-04-23 PROCEDURE — 93325 DOPPLER ECHO COLOR FLOW MAPG: CPT

## 2024-04-23 PROCEDURE — 93320 DOPPLER ECHO COMPLETE: CPT

## 2024-04-23 PROCEDURE — 93017 CV STRESS TEST TRACING ONLY: CPT

## 2024-04-23 PROCEDURE — 93016 CV STRESS TEST SUPVJ ONLY: CPT | Performed by: INTERNAL MEDICINE

## 2024-04-23 PROCEDURE — 93350 STRESS TTE ONLY: CPT | Performed by: INTERNAL MEDICINE

## 2024-04-23 PROCEDURE — 95806 SLEEP STUDY UNATT&RESP EFFT: CPT

## 2024-04-23 PROCEDURE — 93352 ADMIN ECG CONTRAST AGENT: CPT | Performed by: INTERNAL MEDICINE

## 2024-04-23 PROCEDURE — 93325 DOPPLER ECHO COLOR FLOW MAPG: CPT | Performed by: INTERNAL MEDICINE

## 2024-04-23 PROCEDURE — 25510000001 PERFLUTREN (DEFINITY) 8.476 MG IN SODIUM CHLORIDE (PF) 0.9 % 10 ML INJECTION: Performed by: INTERNAL MEDICINE

## 2024-04-23 PROCEDURE — 93018 CV STRESS TEST I&R ONLY: CPT | Performed by: INTERNAL MEDICINE

## 2024-04-23 RX ADMIN — PERFLUTREN 2 ML: 6.52 INJECTION, SUSPENSION INTRAVENOUS at 10:00

## 2024-04-23 NOTE — TELEPHONE ENCOUNTER
Please let her know that stress echo looks good.  Her heart is strong and functioning well.  No evidence for tightly blocked coronary arteries on this testing.  Would continue current medications and keep currently scheduled appointments.

## 2024-04-23 NOTE — TELEPHONE ENCOUNTER
Results and recommendations called to pt.  Instructed to call with any further questions or concerns.  Verbalized understanding.    Juana Ortiz RN  [place] Nurse, [facility] [place]  04/23/24 16:08 EDT

## 2024-05-06 ENCOUNTER — HOSPITAL ENCOUNTER (OUTPATIENT)
Dept: MAMMOGRAPHY | Facility: HOSPITAL | Age: 54
Discharge: HOME | End: 2024-05-06
Admitting: SURGERY
Payer: COMMERCIAL

## 2024-05-06 DIAGNOSIS — Z12.31 SCREENING MAMMOGRAM FOR BREAST CANCER: ICD-10-CM

## 2024-05-06 PROCEDURE — 77067 SCR MAMMO BI INCL CAD: CPT

## 2024-05-06 PROCEDURE — 77063 BREAST TOMOSYNTHESIS BI: CPT

## 2024-05-07 ENCOUNTER — DOCUMENTATION (OUTPATIENT)
Dept: MAMMOGRAPHY | Facility: CLINIC | Age: 54
End: 2024-05-07
Payer: COMMERCIAL

## 2024-05-07 ENCOUNTER — TELEPHONE (OUTPATIENT)
Dept: CARDIOLOGY | Facility: CLINIC | Age: 54
End: 2024-05-07
Payer: COMMERCIAL

## 2024-05-07 NOTE — TELEPHONE ENCOUNTER
I spoke with Mary Banda and updated pt on results/recommendations from provider.  Pt verbalized understanding and has no further questions at this time.  She already has an appt this Friday with the sleep center.    Thank you,    Chante HERRING RN  [place] [facility] [place]  05/07/24 15:16 EDT

## 2024-05-07 NOTE — TELEPHONE ENCOUNTER
Please let patient know sleep study was very abnormal and she definitely has sleep apnea.  Sleep lab is to contact her to get an appointment set up for consult to consider treatment.  I would strongly advise she do this.

## 2024-05-10 ENCOUNTER — OFFICE VISIT (OUTPATIENT)
Dept: SLEEP MEDICINE | Facility: HOSPITAL | Age: 54
End: 2024-05-10
Payer: COMMERCIAL

## 2024-05-10 VITALS
SYSTOLIC BLOOD PRESSURE: 124 MMHG | HEART RATE: 62 BPM | BODY MASS INDEX: 38.16 KG/M2 | DIASTOLIC BLOOD PRESSURE: 80 MMHG | OXYGEN SATURATION: 96 % | HEIGHT: 62 IN | WEIGHT: 207.4 LBS

## 2024-05-10 DIAGNOSIS — R00.2 HEART PALPITATIONS: ICD-10-CM

## 2024-05-10 DIAGNOSIS — G47.33 OBSTRUCTIVE SLEEP APNEA, ADULT: Primary | ICD-10-CM

## 2024-05-10 PROCEDURE — G0463 HOSPITAL OUTPT CLINIC VISIT: HCPCS

## 2024-05-10 NOTE — PROGRESS NOTES
[facility]  Telephone: [phone] / Fax: [phone] [place]  Telephone: [phone] / Fax: [phone] Cindy Casanova    Referring Physician: Katie Felipe PA  PCP: Katie Felipe PA    Reason for consult:  sleep apnea    Mary Banda is a 53 y.o.female  was seen in the [facility] today for evaluation of sleep apnea.     She had HST through [facility].  She saw [facility] recently to evaluate chest pain, shortness of breath. She has history of atrial tachycardia. She c/o EDS, snoring, trying to catch her breath during sleep.  Her bedtime schedule is MN-5:30am. ESS is 2.     She struggled with weight loss over the years. She has new dx of DM2.     Study done in April 2024 showed mild-moderate KAVITHA. Overall AHI/YUNIOR was 12.8/hr. Supine event index was 20/hr. Rt side event index was 16.4/hr.    SH- 3-4 caffeine per day, no alcohol, no energy drinks, smokes cigarettes.    ROS-+SOA, rest is negative.      Mary Banda  has a past medical history of Abnormal CT of the chest (03/24/2019), Allergic (Years), Anxiety, Arthritis, Breast nodule, COVID (12/21/2021), DDD (degenerative disc disease), lumbar, Gallstones, H/O Anxiety, Heart palpitations, IBS (irritable bowel syndrome), Low back pain, PONV (postoperative nausea and vomiting), Seasonal allergies, and Visual impairment (Couple of months).    Current Medications:    Current Outpatient Medications:     atorvastatin (Lipitor) 20 MG tablet, Take 1 tablet by mouth Every Night., Disp: 90 tablet, Rfl: 0    fluticasone (FLONASE) 50 MCG/ACT nasal spray, 2 sprays into the nostril(s) as directed by provider Daily for 30 days. Administer 2 sprays in each nostril for each dose., Disp: 16 g, Rfl: 0    hydroCHLOROthiazide (MICROZIDE) 12.5 MG capsule, Take 1 capsule by mouth Daily., Disp: 30 capsule, Rfl: 0    ibuprofen (ADVIL,MOTRIN) 400 MG tablet, Take 1 tablet by mouth Every 6 (Six) Hours As Needed for Mild Pain., Disp: , Rfl:     metFORMIN  "(GLUCOPHAGE) 500 MG tablet, Take 1 tablet by mouth 2 (Two) Times a Day With Meals., Disp: 180 tablet, Rfl: 0    I have reviewed Past Medical History, Past Surgical History, Medication List, Social History and Family History as entered in Sleep Questionnaire and EPIC.    ESS  2   Vital Signs /80   Pulse 62   Ht 157.5 cm (62\")   Wt 94.1 kg (207 lb 6.4 oz)   SpO2 96%   BMI 37.93 kg/m²  Body mass index is 37.93 kg/m².    General Alert and oriented. No acute distress noted   Pharynx/Throat Class III  Mallampati airway, large tongue, no evidence of redundant lateral pharyngeal tissue. No oral lesions. No thrush. Moist mucous membranes.   Head Normocephalic. Symmetrical. Atraumatic.    Nose No septal deviation. No drainage   Chest Wall Normal shape. Symmetric expansion with respiration. No tenderness.   Neck Trachea midline, no thyromegaly or adenopathy    Lungs Clear to auscultation bilaterally. No wheezes. No rhonchi. No rales. Respirations regular, even and unlabored.   Heart Regular rhythm and normal rate. Normal S1 and S2. No murmur   Abdomen Soft, non-tender and non-distended. Normal bowel sounds. No masses.   Extremities Moves all extremities well. No edema   Psychiatric Normal mood and affect.       Home Sleep Study Findings:   Recording start time 9:14 PM and recording end time 5:56 AM. Total recording time 521.5 minutes and monitoring time 473 minutes.      The patient had 46 obstructive events and 55 partial obstructive events. AHI/YUNIOR for total monitoring time is mildly abnormal at 12.8 events per hour.  When supine for 161.3 minutes, AHI moderately abnormal at 20.6 events per hour.  On her left side for 197.5 minutes, AHI normal.  However, on her right side for 115.7 minutes, AHI of moderately abnormal at 16.4 events per hour.     Lowest oxygen saturation is 85% and no sleep-related hypoxia noted.       Impression:  1. Obstructive sleep apnea, adult    2. Heart palpitations          Plan:  I " discussed sleep study results done recently with patient. I reviewed pathophysiology of obstructive sleep apnea her.      We discussed the adverse outcomes associated with untreated sleep-disordered breathing.      We discussed treatment modalities of obstructive sleep apnea including CPAP device. Patient agrees to initiate treatment with CPAP device.    I ordered auto CPAP 5-20cm H2O through local DME.      Compliance requirements reviewed with patient.     She will follow up with Dr Abebe in 3 months to review response to therapy.      I appreciate the opportunity to participate in this patient's care.      HOMERO Mark  [facility]  Phone: [phone]      Part of this note may be an electronic transcription/translation of spoken language to printed text using the Dragon Dictation System. Some errors may exist even though the document was edited.

## 2024-05-14 ENCOUNTER — TELEPHONE (OUTPATIENT)
Dept: SLEEP MEDICINE | Facility: HOSPITAL | Age: 54
End: 2024-05-14
Payer: COMMERCIAL

## 2024-05-15 ENCOUNTER — OFFICE VISIT (OUTPATIENT)
Dept: MAMMOGRAPHY | Facility: CLINIC | Age: 54
End: 2024-05-15
Payer: COMMERCIAL

## 2024-05-15 VITALS
BODY MASS INDEX: 36.14 KG/M2 | HEART RATE: 95 BPM | SYSTOLIC BLOOD PRESSURE: 153 MMHG | OXYGEN SATURATION: 97 % | DIASTOLIC BLOOD PRESSURE: 83 MMHG | WEIGHT: 204 LBS | HEIGHT: 63 IN

## 2024-05-15 DIAGNOSIS — Z91.89 AT HIGH RISK FOR BREAST CANCER: Primary | ICD-10-CM

## 2024-05-15 PROCEDURE — 99213 OFFICE O/P EST LOW 20 MIN: CPT | Performed by: SURGERY

## 2024-05-15 NOTE — PROGRESS NOTES
Subjective   Mary Banda is a 53 y.o. female     History of Present Illness   She is a nice 53-year-old white female noted to be at high risk for developing breast cancer.  Her imaging surveillance is in good order.  She had mammograms in May 2024.  I have personally reviewed those imaging studies along with the reports.  There is a rounded mass in the right breast which is smaller from previous studies and has previously been noted to be a cyst.  She has calcium deposit in the left breast which is stable.  Otherwise there were no worrisome calcifications or areas of architectural distortion.    The patient has not had any new cases of breast cancer in her family.  She has not palpated any masses but has had an infected skin lesion in the upper inner quadrant of the left breast which recently drained.      Review of Systems constitutional-no unusual changes in weight or appetite.  Past Medical History:   Diagnosis Date    Abnormal CT of the chest 03/24/2019    Recheck CT chest 1/2020 with resolution of abnormality and no new abnormality    Allergic Years    Sulfa    Anxiety     Arthritis     Breast nodule     RIGHT    COVID 12/21/2021    DDD (degenerative disc disease), lumbar     Gallstones     H/O Anxiety     Heart palpitations     AT TIMES    IBS (irritable bowel syndrome)     Low back pain     PONV (postoperative nausea and vomiting)     Seasonal allergies     Visual impairment Couple of months    Vision getting worse, eyelids drooping     Past Surgical History:   Procedure Laterality Date    BREAST BIOPSY Right 8/9/2017    Procedure: RIGHT BREAST BIOPSY WITH NEEDLE LOCALIZATION ;  Surgeon: Enrico Emerson MD;  Location: [facility];  Service:     BREAST BIOPSY Right 03/2017    U/S guided bx- non-atypical ductal hyperplasia    CHOLECYSTECTOMY  2002    ENDOMETRIAL ABLATION W/ NOVASURE  2006    OOPHORECTOMY Left 1994    Part of left ovary removed    TUBAL ABDOMINAL LIGATION  1995     Family History    Problem Relation Age of Onset    Stroke Father     Hyperlipidemia Father     Heart disease Father     Diabetes Father     Heart attack Father     Arthritis Father     Thyroid disease Mother     Heart disease Mother     Depression Mother     Hypothyroidism Mother     Arthritis Mother     Alcohol abuse Mother     Lupus Sister     Aneurysm Sister     Heart disease Sister     Cerebral aneurysm Sister     Early death Sister         Brain stem aneurysm    Breast cancer Sister     Depression Daughter     Hypertension Daughter     Hypothyroidism Daughter     Heart disease Paternal Grandmother     Lung cancer Paternal Grandmother     Heart disease Maternal Grandmother     Alcohol abuse Maternal Grandmother     Lung cancer Maternal Grandmother     Stroke Maternal Grandmother     Heart disease Maternal Grandfather     Heart attack Maternal Grandfather     Diabetes Paternal Aunt     Diabetes Paternal Uncle     Breast cancer Other     Ovarian cancer Neg Hx     Uterine cancer Neg Hx     Colon cancer Neg Hx      Social History     Socioeconomic History    Marital status:      Spouse name: Ty    Years of education: College   Tobacco Use    Smoking status: Every Day     Current packs/day: 1.00     Average packs/day: 1 pack/day for 33.4 years (33.4 ttl pk-yrs)     Types: Cigarettes     Start date: 1/1/1991     Passive exposure: Current    Smokeless tobacco: Never    Tobacco comments:     Caffeine - 3-4 diet pepsi   Vaping Use    Vaping status: Never Used   Substance and Sexual Activity    Alcohol use: Not Currently     Comment: social    Drug use: Never    Sexual activity: Yes     Partners: Male     Birth control/protection: Surgical     Comment: Tubal ligation       Objective   Physical Exam  Class 2 Severe Obesity (BMI >=35 and <=39.9). Obesity-related health conditions include the following: diabetes mellitus and osteoarthritis. Obesity is unchanged. BMI is is above average; BMI management plan is completed. We  discussed portion control, increasing exercise, and Information on healthy weight added to patient's after visit summary.  Left breast-medium size and symmetrical.  She does have an infected skin lesion in the upper inner quadrant.  There is a little bit of surrounding redness and a scab where the area drained.  There is no skin dimpling or nipple retraction when the arms are raised and the pectoralis muscles contracted.  I do not palpate any worrisome masses in the breast.  I could not express any nipple discharge.  Right breast-medium size and symmetrical.  The skin of the breast looks normal without nipple discharge.  I could not see any skin dimpling or nipple retraction when the arms were maneuvered.  There were no worrisome palpable masses within the breast.  Lymphatics-no cervical or axillary adenopathy.    Assessment & Plan   1.  At high risk for breast cancer-the patient's imaging is up-to-date and there is nothing concerning on physical examination.  Orders were placed for an MRI to be performed in October or November of this year.  If that looks good, I will see her back in the office in 1 year.    2.  Obesity-the patient was given information on healthy habits on her after visit summary.    There were no encounter diagnoses.

## 2024-05-15 NOTE — LETTER
May 15, 2024     BLAKE Mckenzie  [address] [facility] [address]    Patient: Mary Banda   YOB: 1970   Date of Visit: 5/15/2024     Dear BLAKE Mckenzie:       Thank you for referring Mary Banda to me for evaluation. Below are the relevant portions of my assessment and plan of care.    If you have questions, please do not hesitate to call me. I look forward to following Mary along with you.         Sincerely,        Enrico Emerson MD        CC: MD Ki Mejia, Enrico VALLE MD  05/15/24 1612  Sign when Signing Visit  Subjective  Mary Banda is a 53 y.o. female     History of Present Illness   She is a nice 53-year-old white female noted to be at high risk for developing breast cancer.  Her imaging surveillance is in good order.  She had mammograms in May 2024.  I have personally reviewed those imaging studies along with the reports.  There is a rounded mass in the right breast which is smaller from previous studies and has previously been noted to be a cyst.  She has calcium deposit in the left breast which is stable.  Otherwise there were no worrisome calcifications or areas of architectural distortion.    The patient has not had any new cases of breast cancer in her family.  She has not palpated any masses but has had an infected skin lesion in the upper inner quadrant of the left breast which recently drained.      Review of Systems constitutional-no unusual changes in weight or appetite.  Past Medical History:   Diagnosis Date   • Abnormal CT of the chest 03/24/2019    Recheck CT chest 1/2020 with resolution of abnormality and no new abnormality   • Allergic Years    Sulfa   • Anxiety    • Arthritis    • Breast nodule     RIGHT   • COVID 12/21/2021   • DDD (degenerative disc disease), lumbar    • Gallstones    • H/O Anxiety    • Heart palpitations     AT TIMES   • IBS (irritable bowel syndrome)    • Low back pain    • PONV (postoperative nausea and vomiting)     • Seasonal allergies    • Visual impairment Couple of months    Vision getting worse, eyelids drooping     Past Surgical History:   Procedure Laterality Date   • BREAST BIOPSY Right 8/9/2017    Procedure: RIGHT BREAST BIOPSY WITH NEEDLE LOCALIZATION ;  Surgeon: Enrico Emerson MD;  Location: [facility] OR;  Service:    • BREAST BIOPSY Right 03/2017    U/S guided bx- non-atypical ductal hyperplasia   • CHOLECYSTECTOMY  2002   • ENDOMETRIAL ABLATION W/ NOVASURE  2006   • OOPHORECTOMY Left 1994    Part of left ovary removed   • TUBAL ABDOMINAL LIGATION  1995     Family History   Problem Relation Age of Onset   • Stroke Father    • Hyperlipidemia Father    • Heart disease Father    • Diabetes Father    • Heart attack Father    • Arthritis Father    • Thyroid disease Mother    • Heart disease Mother    • Depression Mother    • Hypothyroidism Mother    • Arthritis Mother    • Alcohol abuse Mother    • Lupus Sister    • Aneurysm Sister    • Heart disease Sister    • Cerebral aneurysm Sister    • Early death Sister         Brain stem aneurysm   • Breast cancer Sister    • Depression Daughter    • Hypertension Daughter    • Hypothyroidism Daughter    • Heart disease Paternal Grandmother    • Lung cancer Paternal Grandmother    • Heart disease Maternal Grandmother    • Alcohol abuse Maternal Grandmother    • Lung cancer Maternal Grandmother    • Stroke Maternal Grandmother    • Heart disease Maternal Grandfather    • Heart attack Maternal Grandfather    • Diabetes Paternal Aunt    • Diabetes Paternal Uncle    • Breast cancer Other    • Ovarian cancer Neg Hx    • Uterine cancer Neg Hx    • Colon cancer Neg Hx      Social History     Socioeconomic History   • Marital status:      Spouse name: Ty   • Years of education: College   Tobacco Use   • Smoking status: Every Day     Current packs/day: 1.00     Average packs/day: 1 pack/day for 33.4 years (33.4 ttl pk-yrs)     Types: Cigarettes     Start date: 1/1/1991      Passive exposure: Current   • Smokeless tobacco: Never   • Tobacco comments:     Caffeine - 3-4 diet pepsi   Vaping Use   • Vaping status: Never Used   Substance and Sexual Activity   • Alcohol use: Not Currently     Comment: social   • Drug use: Never   • Sexual activity: Yes     Partners: Male     Birth control/protection: Surgical     Comment: Tubal ligation       Objective  Physical Exam  Class 2 Severe Obesity (BMI >=35 and <=39.9). Obesity-related health conditions include the following: diabetes mellitus and osteoarthritis. Obesity is unchanged. BMI is is above average; BMI management plan is completed. We discussed portion control, increasing exercise, and Information on healthy weight added to patient's after visit summary.  Left breast-medium size and symmetrical.  She does have an infected skin lesion in the upper inner quadrant.  There is a little bit of surrounding redness and a scab where the area drained.  There is no skin dimpling or nipple retraction when the arms are raised and the pectoralis muscles contracted.  I do not palpate any worrisome masses in the breast.  I could not express any nipple discharge.  Right breast-medium size and symmetrical.  The skin of the breast looks normal without nipple discharge.  I could not see any skin dimpling or nipple retraction when the arms were maneuvered.  There were no worrisome palpable masses within the breast.  Lymphatics-no cervical or axillary adenopathy.    Assessment & Plan  1.  At high risk for breast cancer-the patient's imaging is up-to-date and there is nothing concerning on physical examination.  Orders were placed for an MRI to be performed in October or November of this year.  If that looks good, I will see her back in the office in 1 year.    2.  Obesity-the patient was given information on healthy habits on her after visit summary.    There were no encounter diagnoses.

## 2024-05-21 DIAGNOSIS — R03.0 ELEVATED BLOOD PRESSURE READING: ICD-10-CM

## 2024-05-24 RX ORDER — HYDROCHLOROTHIAZIDE 12.5 MG/1
12.5 CAPSULE, GELATIN COATED ORAL DAILY
Qty: 30 CAPSULE | Refills: 0 | Status: SHIPPED | OUTPATIENT
Start: 2024-05-24

## 2024-05-24 NOTE — TELEPHONE ENCOUNTER
See result note- she was to follow up with me in 3 months from 2/2024. Please schedule her for follow up with me.

## 2024-06-14 ENCOUNTER — OFFICE VISIT (OUTPATIENT)
Dept: FAMILY MEDICINE CLINIC | Facility: CLINIC | Age: 54
End: 2024-06-14
Payer: COMMERCIAL

## 2024-06-14 VITALS
HEART RATE: 80 BPM | BODY MASS INDEX: 37 KG/M2 | WEIGHT: 208.8 LBS | DIASTOLIC BLOOD PRESSURE: 80 MMHG | HEIGHT: 63 IN | SYSTOLIC BLOOD PRESSURE: 142 MMHG | TEMPERATURE: 98.1 F | RESPIRATION RATE: 14 BRPM

## 2024-06-14 DIAGNOSIS — G89.29 CHRONIC PAIN OF BOTH KNEES: ICD-10-CM

## 2024-06-14 DIAGNOSIS — R60.0 LOCALIZED EDEMA: ICD-10-CM

## 2024-06-14 DIAGNOSIS — G25.81 RESTLESS LEG SYNDROME, UNCONTROLLED: ICD-10-CM

## 2024-06-14 DIAGNOSIS — R76.8 POSITIVE ANA (ANTINUCLEAR ANTIBODY): ICD-10-CM

## 2024-06-14 DIAGNOSIS — G47.9 SLEEP DISTURBANCE: ICD-10-CM

## 2024-06-14 DIAGNOSIS — J30.9 ALLERGIC RHINITIS, UNSPECIFIED SEASONALITY, UNSPECIFIED TRIGGER: ICD-10-CM

## 2024-06-14 DIAGNOSIS — R20.2 NUMBNESS AND TINGLING IN RIGHT HAND: ICD-10-CM

## 2024-06-14 DIAGNOSIS — R20.0 NUMBNESS AND TINGLING IN RIGHT HAND: ICD-10-CM

## 2024-06-14 DIAGNOSIS — E55.9 VITAMIN D DEFICIENCY: ICD-10-CM

## 2024-06-14 DIAGNOSIS — E53.8 B12 DEFICIENCY: ICD-10-CM

## 2024-06-14 DIAGNOSIS — E78.2 MIXED HYPERLIPIDEMIA: ICD-10-CM

## 2024-06-14 DIAGNOSIS — G47.26 SHIFT WORK SLEEP DISORDER: ICD-10-CM

## 2024-06-14 DIAGNOSIS — E61.1 IRON DEFICIENCY: ICD-10-CM

## 2024-06-14 DIAGNOSIS — M79.672 BILATERAL FOOT PAIN: ICD-10-CM

## 2024-06-14 DIAGNOSIS — M25.562 CHRONIC PAIN OF BOTH KNEES: ICD-10-CM

## 2024-06-14 DIAGNOSIS — M25.50 MULTIPLE JOINT PAIN: ICD-10-CM

## 2024-06-14 DIAGNOSIS — Z91.89 INCREASED RISK OF BREAST CANCER: ICD-10-CM

## 2024-06-14 DIAGNOSIS — M54.50 CHRONIC LEFT-SIDED LOW BACK PAIN WITHOUT SCIATICA: ICD-10-CM

## 2024-06-14 DIAGNOSIS — M19.049 INFLAMMATION OF HAND JOINT, UNSPECIFIED LATERALITY: ICD-10-CM

## 2024-06-14 DIAGNOSIS — R73.03 PREDIABETES: ICD-10-CM

## 2024-06-14 DIAGNOSIS — G89.29 CHRONIC LEFT-SIDED LOW BACK PAIN WITHOUT SCIATICA: ICD-10-CM

## 2024-06-14 DIAGNOSIS — R03.0 ELEVATED BLOOD PRESSURE READING: Primary | ICD-10-CM

## 2024-06-14 DIAGNOSIS — D72.829 LEUKOCYTOSIS, UNSPECIFIED TYPE: ICD-10-CM

## 2024-06-14 DIAGNOSIS — N64.4 BREAST PAIN: ICD-10-CM

## 2024-06-14 DIAGNOSIS — M79.671 BILATERAL FOOT PAIN: ICD-10-CM

## 2024-06-14 DIAGNOSIS — R40.0 DAYTIME SOMNOLENCE: ICD-10-CM

## 2024-06-14 DIAGNOSIS — G47.9 DISTURBANCE IN SLEEP BEHAVIOR: ICD-10-CM

## 2024-06-14 DIAGNOSIS — D72.820 LYMPHOCYTOSIS: ICD-10-CM

## 2024-06-14 DIAGNOSIS — F41.8 MIXED ANXIETY DEPRESSIVE DISORDER: ICD-10-CM

## 2024-06-14 DIAGNOSIS — E53.8 BIOTIN DEFICIENCY DISEASE: ICD-10-CM

## 2024-06-14 DIAGNOSIS — M25.561 CHRONIC PAIN OF BOTH KNEES: ICD-10-CM

## 2024-06-14 DIAGNOSIS — R53.83 FATIGUE, UNSPECIFIED TYPE: ICD-10-CM

## 2024-06-14 PROBLEM — M75.81 TENDINITIS OF RIGHT ROTATOR CUFF: Status: ACTIVE | Noted: 2023-11-17

## 2024-06-14 NOTE — PROGRESS NOTES
"Subjective   Mary Banda is a 53 y.o. female who presents today in follow up of diabetes, blood pressure, edema, hyperlipidemia, vitamin D deficiency, B12 deficiency, biotin deficiency, iron deficiency, leukocytosis, RLS, abnormal sleep behavior, fatigue, moods, allergic rhinitis, breast symptoms, back and neck pain, foot pain, hand numbness, and specialist    Hypertension  Associated symptoms include chest pain, neck pain, palpitations and shortness of breath.       Diabetes mellitus- reports she only tried the medication once. She took only 1 dose of medication and reports she had abdominal pain and did not take again. She has made changes to diet- she reports she is not losing weight.   A1C has been elevated for a couple years- up to 6%.  Up to 6.4% 4/2023.  Was to follow-up in 3 months but did not follow-up.  No medications.   She did not follow up 3 months from 6/2021    Elevated BP-taking HCTZ 12.5 mg daily- morning 130s/80s. Prior evenings has been 120s/70s-80s.   She previously reported her BP has been \"decent\"130s/80s.   8/2021- Finished med pass and felt left chest discomfort and uneasy- 179/97. Took Aspirin. No radiation of check. She has palpitations but none more than normal. She noticed SOA with exertion. She was sweating but was wearing N95 and PPE. No nausea, vomiting. She clocked out, took a nap, sat and charted, and decreased to 135/53. She had diet Pepsi- not abnormal. Not more stressed than normal and was done early- maybe more stressed with work. Now back to N95 and gowns and all patients on isolation. No other contributing factors. 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   Checked BP- 134/53, 155/81 after med pass. Does not usually check before and after med pass.    Edema- swelling continues- mild.    She would take in the past for puffy hands. Patient has Rx from previous PCP for HCTZ 12.5 mg daily. Taking 1-2 x weekly. Does not have some puffiness in hands. " Notices the day after a salty. No SOA associated with swelling.   Mixed hyperlipidemia-started on Lipitor 20 mg nightly 4/2023.  Was to follow-up in 1 month nonfasting in 3 months fasting and did not follow-up.  Patient was on no medication. Has not been diligent with diet or exercise. Decreasing sodas and increasing water. Patient did not follow up 3 months. Remembers most nights.    Vitamin D deficiency- taking vitamin D but not sure dosage. Remembers sometimes.   Advised to take vitamin D 2000 IU daily 4/2023.  Was to recheck in 3 months.  Did not follow-up.  B12 deficiency- taking B12- not sure the dose. Remembers sometimes.   Advised to take B12 100 mcg daily 4/2023.  Patient did not follow-up in 3 months as directed.  She was taking B12 not sure the dose.   Previously advised to take 500 mcg daily and once monthly B12 injection 6/2021. She did not follow up in 3 months as directed.   Biotin deficiency- taking Biotin- not sure the dosage. Remembers somtimes.   Per labs 2/2021, to take Biotin supplement- taking but not sure the amount.   Iron deficiency- low iron saturation but iron was ok. Referred to hematology 2/2021. Not taking iron.   Leukocytosis- no signs of infection or malignancy, recent unexpected weight loss. She continues to smoke- Rx Chantix 12/2020. She was referred to hematology 2/2021.     KAVITHA- just started CPAP. On day 8-9 of using CPAP.   Prior- she had not seen Sleep Medicine as referred.   RLS- does not take all the time but when she takes, she sleeps more. Patient reports the Requip makes tired.   Has not had sleep study.   Dx by Roberta.   Requip 1 mg once daily- works for symptoms- not taking every night. The 1 mg tablet makes her too sleepy, so she only takes if her legs are bothersome.    Abnormal sleep behavior- now seeing sleep medicine  She previously did not see sleep medicine as referred.  She reports she is eating in her sleep. She reports she wok up and had a package of Oreos.  Waking up every 2-3 hours.   Fatigue/snoring-patient did not see sleep medicine as referred.  She complained of being tired during the day. If she sits down, she falls asleep. Snoring- loud but no reported apnea.   She changed to night shift- made life easier- easier at night. Hard to change back and forth when off. Working 3 days per week. Trouble falling asleep and had to get up to urinate frequently. She was not sleeping well on 1st shift. 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   Fatigue-   The past couple weeks, when she sits down, she falls right to sleep. Never did that in the past. Sometimes falls asleep and cannot sleep at night and sometimes can still sleep at night.     Moods- doing pretty well.   She had increased stress and has been very busy- she would like to restart Prozac. 20 mg worked well and 40 mg caused sexual dysfunction  3/2022- She felt she did not need to take anything and was doing ok.   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 Now has mother in law living with her and work changes with Covid. Having increased anxiety and has noticed her BP was up.   In the past, was on Prozac 20 mg daily- worked very well then wasn't enough and increased it and was too much- sexual AE. No mood AE with increased dosing. No SI/HI, concerns for safety.   She previously felt something and noticed anxiety was causing SOA. She continued with symptoms but not as often. She wanted to stay on the same dose of Prozac. Did not do well on 40 mg- sexual AE.  Previous medications- Lexapro- didn't help and may have made symptoms worse. Wellbutrin- didn't help- did nothing. No other medications. Prozac- stopped working but sexual AE on 40 mg.     Allergic rhinitis- previously Flonase as needed. Not taking now.     Breast itching, family history of BRCA-following with high risk breast specialist-Dr. Emerson.  Also continues follow-up with GYN.  Advised  MRI breast and 3D mammograms.  Consideration of chemoprevention.  Patient would like to wait on chemoprevention.  Patient's GYN- Dr Rios- 2021 and was to follow up in 1 month. She has had increased breast itching. Daughter and niece positive for BRCA gene. She had a lump in her breast and they are sending to a specialist. She cannot get much information from her daughter.   Left elbow pain x 4 months. She reports she has had left elbow pain with turning her arm over, can feel pain. She has compression sleeve that she wears at times that helps. No tendonitis brace.     Positive JAKOB, inflammation on hand ultrasound-seen by rheumatology and advised consideration of Plaquenil.  Patient wanted to wait.  Previously orthopedist in the past. Last appt 10/2023.    Mother with RA, sister with Lupus, RA, and Raynaud's.   Bilateral foot pain, bilateral knee pain.  Low back pain with sciatica, cervical DDD-   Pain is worse at certain times. If lays flat, back hurts. Neck was not hurting. The only time can sit and work on something is cross stitch.   Flexeril- takes as needed- has not taken in months.   She reported she has a knot on her back for several months. No changes, pain, or itching. Small.   Foot pain- surgery for ganglion cyst at [facility] to be performed 2/22/2024. Off 2 weeks after surgery. She is hoping that she will be able to help knee pain and other pains if does not hurt to walk.   She had mostly left foot pain-sharp pain in medial and lateral aspect of her foot. They advised neuroma with previous podiatry. She was in a boot for a while. Has not returned to podiatry. When flexes toes, under left 1st MCP joint- has pain. If not wearing tennis shoes, she has increased pain in feet.   Patient complained of bilateral foot pain x > 1 year. When she was on her feet all day, waking up in the morning, and after sitting down in the morning then getting back up, she has lateral foot pain. She tried padded toe  separators for possible bunion but no inserts. She thought they helped when she wore them but did not tolerate well.   Right hand numbness- still happens occasionally. Positional and improves with putting her arm down.   She reported when she held her right arm up, she had numbness with cross stitch.     Colon cancer screening- negative Cologuard 6/5/023.   PGUncle with Colon cancer. Sister with precancerous polyps. She has Lupus. Parents had normal colonoscopy.     Patient's Specialists:  Breast surgery- Dr Emerson- last appt 5/2023 for right breast pain, at high risk for breast cancer-estimated lifetime risk 33 and 39%.  Findings qualify for supplement MRI imaging along with yearly 3D mammography.  Alternate 6-month visits with OB/GYN.  Referred to genetic counselor.  She could also consider chemoprevention and talked about medications involved along with side effects.  She would like to wait on medication because she felt risks outweighed the benefits.  Follow-up 1 year.  Prior-breast pain thought to be fibrocystic condition. Discussed decreasing caffeine, oral vit E 400 units daily or BID, and evening primrose oil [id number] mg orally. Continue sports bra and ising diclofenac gel. Follow up 3 months. Patient cancelled follow up 12/2021 and did  Not reschedule.   Cardiology- Dr Horn- last appt 11/2021 for precordial CP, palpitations, elevated glucose, and hyperlipidemia. Advised stress echo, low salt diet and regular exercise once stress test completed, recommended low cholesterol, low carb diet, increase omega 3, decrease excessive caffeine intake and 2 week Zio patch if persistent symptoms, consider treatment prediabetes. Follow up APRN 6 months and MD in 1 year. Patient no show 2 stress echo appts. Follow up scheduled 6/2022-patient canceled and has not rescheduled.   Prior 3/2019 for precordial chest pain, palpitations, abnormal CT chest, FHx tachycardia mediated cardiomyopathy, premature CAD, FHx cerebral  aneurysm. Referred for CTA chest, echo, and holter monitor. CTA with patchy infiltrate- started on Levaquin and pulmonology follow up in 1 week.   Negative Echo and stress test and holter with 1 episode of atrial tach- advised avoid stimulants, caffeine, alcohol, chocolate. Advised follow up in 6 months. Patient no show follow up.    Genetics-genetic testing negative for pathogenic mutations by sequencing, rearrangement testing, and RNA analysis for 36 genes on the CancerNext panel.  Discussed chemoprevention-tamoxifen, raloxifene.  General surgery- Dr Espinoza Fitzpatrick- last appt 10/2021 for sebaceous cyst. Recovered with antibiotics. No need for excision. Return if cyst recurs for consideration of excision.   Neurosurgery- HOMERO Horn- last appt 5/2017 for low back pain with sciatica. 4/2017 MRI mild scoliosis centered at L3-4, mild degenerative changes, L5-S1 right sided neuroforaminal narrowing. Follow up PRN.   GYN-HOMERO Turner-last appointment 10/2023 for well woman exam, Pap, HPV testing.  Advised healthy heart and consider lipids were, exercise, tobacco cessation, and magnesium for anxiety.  Follow-up 1 year.  Prior Dr Loretta Briceño- last appt 10/2021 for well woman exam with PAP, mammogram up to date, epidermal inclusion cyst labia minora, menopausal symptoms. Consider I&D but no issues. Discussed FSH and menopause- declined FSH at that time. Follow up 1 year.   Prior- Dr Matthew Tamayo- 11/2016 for well woman exam and galactorrhea. Normal exam, annual mammogram, labs. Follow up in 1 year. Follow up Dr Solo 7/29/2021.   Hematology- Dr Rose- last appt 3/1/2021 for chronic leukocytosis with intermittent neutrophilia, lymphocytosis, monocytosis, fatigue. Thought to be reactive and related to smoking.  Recommend sleep medicine evaluation. Needed updated mammogram, pelvic exam, and colonoscopy as well as smoking cessation.   Sleep medicine- referred with appt 4/2021. Patient cancelled by  automated reminder system and did not reschedule.  Referred again and had appointment 5/22/2023-patient canceled and did not reschedule.  Hand surgery- Dr Melo- last appt 1/2020 for arthritis hand and trigger finger. Injection and was to follow up in 6 weeks. Patient did not follow up.   Pulmonology- Dr López Pleitez- last appt 3/2019 for SOA, tobacco use, and infiltrate. Started on Breo despite PF not classic for asthma, advised smoking cessation, and consider CT if persistent infiltrate on follow up imaging.   Orthopedic surgery-   Katie Hunt PA-C-last appointment 11/2023 for pain in the right shoulder, tendinitis right rotator cuff.  Injection into the right subacromial space.  Dr Snider- last appt 8/2018 for right shoulder pain- rotator cuff and biceps tendonitis. Injection, PT and follow up in 4 weeks. Did not return.   Rheumatology-Dr. Selene Mcintyre-last appointment in 2023 for positive JAKOB, raised antibody titer, disorder of the immune system, history of leukocytosis, elevated BMI.  Discussed some inflammation on ultrasound and consideration of Plaquenil.  Patient declined Plaquenil treatment, as she had improvement in symptoms.  Follow-up 4 to 5 months.    The following portions of the patient's history were reviewed and updated as appropriate: allergies, current medications, past family history, past medical history, past social history, past surgical history and problem list.    Review of Systems   Constitutional:  Positive for fatigue.   HENT: Negative.     Eyes: Negative.    Respiratory:  Positive for shortness of breath.    Cardiovascular:  Positive for chest pain, palpitations and leg swelling.   Gastrointestinal: Negative.    Musculoskeletal:  Positive for arthralgias, back pain, neck pain and neck stiffness.   Skin:         Subcutaneous nodule   Neurological: Negative.    Hematological: Negative.    Psychiatric/Behavioral:  Positive for sleep disturbance.        Objective   Vitals:     06/14/24 0753   BP: 142/80   Pulse: 80   Resp: 14   Temp: 98.1 °F (36.7 °C)     Body mass index is 37 kg/m².     Physical Exam  Vitals and nursing note reviewed.   Constitutional:       General: She is not in acute distress.     Appearance: Normal appearance. She is well-developed.   HENT:      Head: Normocephalic and atraumatic.      Right Ear: External ear normal.      Left Ear: External ear normal.      Nose: Nose normal.   Eyes:      General: Lids are normal.      Conjunctiva/sclera: Conjunctivae normal.   Neck:      Vascular: No carotid bruit.   Cardiovascular:      Rate and Rhythm: Normal rate and regular rhythm.      Pulses: Normal pulses.      Heart sounds: Normal heart sounds. No murmur heard.     No friction rub. No gallop.   Pulmonary:      Effort: Pulmonary effort is normal. No respiratory distress.      Breath sounds: Normal breath sounds. No wheezing, rhonchi or rales.   Musculoskeletal:         General: No deformity.      Cervical back: Neck supple.   Skin:     General: Skin is warm and dry.   Neurological:      Mental Status: She is alert and oriented to person, place, and time. Mental status is at baseline.      Gait: Gait normal.   Psychiatric:         Speech: Speech normal.         Behavior: Behavior normal.         Thought Content: Thought content normal.     Assessment & Plan   Diagnoses and all orders for this visit:    1. Elevated blood pressure reading (Primary)    2. Mixed hyperlipidemia  -     Comprehensive Metabolic Panel  -     CK  -     Lipid Panel With LDL / HDL Ratio    3. Localized edema  -     TSH  -     T4, free  -     T3, Free  -     Uric Acid  -     Urinalysis With Culture If Indicated -    4. Prediabetes  -     Comprehensive Metabolic Panel  -     Hemoglobin A1c  -     TSH  -     T4, free  -     T3, Free  -     Uric Acid  -     Urinalysis With Culture If Indicated -    5. Vitamin D deficiency  -     Comprehensive Metabolic Panel  -     Vitamin D,25-Hydroxy    6. B12 deficiency  -      CBC & Differential  -     Vitamin B12 & Folate    7. Biotin deficiency disease  -     CBC & Differential  -     Vitamin B7 (Biotin)    8. Iron deficiency  -     CBC & Differential  -     Iron and TIBC  -     Ferritin    9. Leukocytosis, unspecified type  -     CBC & Differential    10. Lymphocytosis  -     CBC & Differential    11. Restless leg syndrome, uncontrolled    12. Disturbance in sleep behavior    13. Daytime somnolence    14. Fatigue, unspecified type    15. Sleep disturbance    16. Shift work sleep disorder    17. Mixed anxiety depressive disorder    18. Allergic rhinitis, unspecified seasonality, unspecified trigger    19. Increased risk of breast cancer    20. Breast pain    21. Positive JAKOB (antinuclear antibody)    22. Inflammation of hand joint, unspecified laterality    23. Multiple joint pain    24. Bilateral foot pain    25. Chronic pain of both knees    26. Chronic left-sided low back pain without sciatica    27. Numbness and tingling in right hand      Assessment and plan  Patient will have fasting labs. Call if no results in 1 week. Stability of conditions, plan, follow up, and further recommendations pending labs. Follow up in no more than 3 months if labs are stable.     Weight gain- Patient wanted to consider medication for weight loss. She has had palpitations, chest pain, and labile BP. I would not recommend Phentermine or stimulant. She previously did not tolerate Wellbutrin, so I would not consider Contrave. However, she has prediabetes in the past and is overdue for follow up of this and fasting labs. She should complete fasting labs and could consider GLP-1 if continued prediabetes. She should have stress echo as ordered by cardiology, and if normal, she should increase exercise to 45 minutes, 5-6 days per week and ensure healthy diet- low fat and low carb.   Diabetes mellitus- A1C remained in prediabetes range 6.4% 4/2023 but increased to 6.7%. She tried metformin only one dose.  She should discuss with her pharmacist a better way to try to tolerate the medication. If she is unable, we can try to consider GLP-1.   Elevated blood pressure-Blood pressure is elevated today.  Continue HCTZ 12.5 mg daily. She should monitor blood pressure and pulse and call if elevated blood pressure or heart rate and we can consider low-dose beta-blocker. We can also consider changing HCTZ to Maxzide if elevated. Continue treating KAVITHA.   Edema- I discussed with patient that PRN use of diuretics is not common and that edema is either a sign of underlying CHF or other chronic conditions or is related to lifestyle, diet, and exercise. I recommend Compression stockings, decrease sugars, starches, and salts, increase water, and ensure proper exercise daily (once cleared by cardiology). Continue treating KAVITHA. Continue daily HCTZ 12.5 mg, use of compression stockings, negative stress echo and no cardiac etiology per specialists, and proper diet.   Mixed hyperlipidemia- Lipids have remained uncontrolled with elevated TG and LDL.  I advise she start Lipitor 20 mg nightly 4/2023.  She did not follow-up for repeat labs or follow-up of medication.  We will need to consider medication pending labs. She will need work on diet, have stress echo per cardiology, and increase exercise if cleared by cardiology.     Vitamin D deficiency- Check dosage and let me know. Dosing recommendations pending labs.   B12 deficiency- Patient to check current dosage. Await labs for further recommendations.   Biotin deficiency- Biotin supplement recommended 2/2021. I will continue to monitor.   Iron deficiency-I will recheck and make further recommendations.   Leukocytosis-This was thought to be from smoking history, however, I wanted hematology consultation to ensure no further workup or treatment is needed. Additional labs and thought to be reactive, likely from smoking.    Left chest pressure and dyspnea on exertion-EKG without acute changes.  She still needs to complete stress echo as ordered. Patient to call to schedule this. She should also follow up with cardiology.  RLS- Symptoms are stable. Continue Requip 1 mg once daily as needed. I have referred to sleep medicine for sleep study for formal diagnosis. She did not go for appt as scheduled and should reschedule.  She was referred again and did not go for appointment.  We will need to have her seen by a sleep medicine or we will no longer be able to prescribe her medication.  Fatigue- Labs with vitamin B12, B7, and vitamin D deficiency as well as slightly low iron. She was advised to supplements as directed.  With snoring and fatigue, she should also see sleep medicine as referred twice.  KAVITHA- Patient to continue treating KAVITHA and follow up with sleep medicine as directed by them.   Shift work sleep disturbance- Continue follow up with sleep medicine. With abnormal sleep schedule from work schedule change, she was advised she could use Trazodone as needed.  However, this needs to be determined by sleep medicine.   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  She had improvement on Prozac 20 mg daily and previously failed higher doses due to side effects. She then weaned off and does not feel she needs medication at this time. Consider follow up if worsening moods or the need for medication. We could also consider Pristiq with perimenopausal symptoms.   Allergic rhinitis- Continue Flonase 2 sprays in each nostril once daily and Claritin or Zyrtec 10 mg once daily as needed.   Increased risk for breast cancer- Patient to continue follow-up with high risk breast specialist and OB/GYN as directed by them and ensure screening testing and treatment as recommended.  Positive JAKOB, hand inflammation on ultrasound, multiple joint pain- She was seen by rheumatology who discussed possible  treatment with Plaquenil.  Patient did not want to treat at that time.  Ensure follow-up as directed by rheumatology.  Ganglion foot- Patient was to have foot surgery that requires medical clearance, and I advised follow up with cardiology. She underwent surgery. Continue follow up with surgeon as directed.    Low back pain with sciatica- Flexeril is working well and only used occasionally.  Follow-up if worsening pain, new or changing symptoms.  Need for colon cancer screening- Patient previously had Cologuard ordered but did not complete. She has FHx colon cancer and precancerous polyps. She should contact Dr Fitzpatrick for colonoscopy, as she is already established with them.     From CPE 3/2021- Small subcutaneous lesion without irritation but otherwise normal exam. She needs updated GYN exam, PAP, mammogram, colonoscopy, and should consider Covid 19 injection. She has upcoming appt GYN 7/2021 and was referred for mammogram and colonoscopy.    Patient has seen specialists as noted above.  I have reviewed available records, including consult notes, labs, and imaging/testing.  Patient to follow-up with specialists as directed by them.    I spent 30 minutes caring for Mary Banda on this date of service. This time includes time spent by me in the following activities as necessary: preparing for the visit, reviewing tests, specialists records and previous visits, obtaining and/or reviewing a separately obtained history, performing a medically appropriate exam and/or evaluation, counseling and educating the patient, family, caregiver, referring and/or communicating with other healthcare professionals, documenting information in the medical record, independently interpreting results and communicating that information with the patient, family, caregiver, and developing a medically appropriate treatment plan with consideration of other conditions, medications, and treatments.                 Purse String (Simple) Text: Given the location of the defect and the characteristics of the surrounding skin a purse string closure was deemed most appropriate.  Undermining was performed circumfirentially around the surgical defect.  A purse string suture was then placed and tightened.

## 2024-06-19 LAB
25(OH)D3+25(OH)D2 SERPL-MCNC: 24.2 NG/ML (ref 30–100)
ALBUMIN SERPL-MCNC: 4 G/DL (ref 3.8–4.9)
ALP SERPL-CCNC: 95 IU/L (ref 44–121)
ALT SERPL-CCNC: 22 IU/L (ref 0–32)
APPEARANCE UR: CLEAR
AST SERPL-CCNC: 19 IU/L (ref 0–40)
BACTERIA #/AREA URNS HPF: ABNORMAL /[HPF]
BACTERIA UR CULT: ABNORMAL
BACTERIA UR CULT: ABNORMAL
BASOPHILS # BLD AUTO: 0.1 X10E3/UL (ref 0–0.2)
BASOPHILS NFR BLD AUTO: 1 %
BILIRUB SERPL-MCNC: 0.3 MG/DL (ref 0–1.2)
BILIRUB UR QL STRIP: NEGATIVE
BIOTIN SERPL-MCNC: <0.05 NG/ML (ref 0.05–0.83)
BUN SERPL-MCNC: 13 MG/DL (ref 6–24)
BUN/CREAT SERPL: 14 (ref 9–23)
CALCIUM SERPL-MCNC: 9.3 MG/DL (ref 8.7–10.2)
CASTS URNS QL MICRO: ABNORMAL /LPF
CHLORIDE SERPL-SCNC: 102 MMOL/L (ref 96–106)
CHOLEST SERPL-MCNC: 183 MG/DL (ref 100–199)
CK SERPL-CCNC: 63 U/L (ref 32–182)
CO2 SERPL-SCNC: 24 MMOL/L (ref 20–29)
COLOR UR: YELLOW
CREAT SERPL-MCNC: 0.95 MG/DL (ref 0.57–1)
EGFRCR SERPLBLD CKD-EPI 2021: 73 ML/MIN/1.73
EOSINOPHIL # BLD AUTO: 0.2 X10E3/UL (ref 0–0.4)
EOSINOPHIL NFR BLD AUTO: 2 %
EPI CELLS #/AREA URNS HPF: >10 /HPF (ref 0–10)
ERYTHROCYTE [DISTWIDTH] IN BLOOD BY AUTOMATED COUNT: 13.5 % (ref 11.7–15.4)
FERRITIN SERPL-MCNC: 88 NG/ML (ref 15–150)
FOLATE SERPL-MCNC: 8 NG/ML
GLOBULIN SER CALC-MCNC: 2.8 G/DL (ref 1.5–4.5)
GLUCOSE SERPL-MCNC: 114 MG/DL (ref 70–99)
GLUCOSE UR QL STRIP: NEGATIVE
HBA1C MFR BLD: 6.5 % (ref 4.8–5.6)
HCT VFR BLD AUTO: 41.2 % (ref 34–46.6)
HDLC SERPL-MCNC: 43 MG/DL
HGB BLD-MCNC: 13.2 G/DL (ref 11.1–15.9)
HGB UR QL STRIP: ABNORMAL
IMM GRANULOCYTES # BLD AUTO: 0 X10E3/UL (ref 0–0.1)
IMM GRANULOCYTES NFR BLD AUTO: 0 %
IRON SATN MFR SERPL: 23 % (ref 15–55)
IRON SERPL-MCNC: 71 UG/DL (ref 27–159)
KETONES UR QL STRIP: NEGATIVE
LDLC SERPL CALC-MCNC: 102 MG/DL (ref 0–99)
LDLC/HDLC SERPL: 2.4 RATIO (ref 0–3.2)
LEUKOCYTE ESTERASE UR QL STRIP: ABNORMAL
LYMPHOCYTES # BLD AUTO: 3.7 X10E3/UL (ref 0.7–3.1)
LYMPHOCYTES NFR BLD AUTO: 35 %
MCH RBC QN AUTO: 27.7 PG (ref 26.6–33)
MCHC RBC AUTO-ENTMCNC: 32 G/DL (ref 31.5–35.7)
MCV RBC AUTO: 87 FL (ref 79–97)
MICRO URNS: ABNORMAL
MONOCYTES # BLD AUTO: 0.8 X10E3/UL (ref 0.1–0.9)
MONOCYTES NFR BLD AUTO: 8 %
NEUTROPHILS # BLD AUTO: 5.8 X10E3/UL (ref 1.4–7)
NEUTROPHILS NFR BLD AUTO: 54 %
NITRITE UR QL STRIP: NEGATIVE
OTHER ANTIBIOTIC SUSC ISLT: ABNORMAL
PH UR STRIP: 6 [PH] (ref 5–7.5)
PLATELET # BLD AUTO: 335 X10E3/UL (ref 150–450)
POTASSIUM SERPL-SCNC: 4.3 MMOL/L (ref 3.5–5.2)
PROT SERPL-MCNC: 6.8 G/DL (ref 6–8.5)
PROT UR QL STRIP: ABNORMAL
RBC # BLD AUTO: 4.76 X10E6/UL (ref 3.77–5.28)
RBC #/AREA URNS HPF: ABNORMAL /HPF (ref 0–2)
SODIUM SERPL-SCNC: 140 MMOL/L (ref 134–144)
SP GR UR STRIP: 1.02 (ref 1–1.03)
T3FREE SERPL-MCNC: 3.3 PG/ML (ref 2–4.4)
T4 FREE SERPL-MCNC: 1.14 NG/DL (ref 0.82–1.77)
TIBC SERPL-MCNC: 315 UG/DL (ref 250–450)
TRIGL SERPL-MCNC: 224 MG/DL (ref 0–149)
TSH SERPL DL<=0.005 MIU/L-ACNC: 2.76 UIU/ML (ref 0.45–4.5)
UIBC SERPL-MCNC: 244 UG/DL (ref 131–425)
URATE SERPL-MCNC: 5.7 MG/DL (ref 3–7.2)
URINALYSIS REFLEX: ABNORMAL
UROBILINOGEN UR STRIP-MCNC: 0.2 MG/DL (ref 0.2–1)
VIT B12 SERPL-MCNC: 349 PG/ML (ref 232–1245)
VLDLC SERPL CALC-MCNC: 38 MG/DL (ref 5–40)
WBC # BLD AUTO: 10.5 X10E3/UL (ref 3.4–10.8)
WBC #/AREA URNS HPF: ABNORMAL /HPF (ref 0–5)

## 2024-06-26 DIAGNOSIS — R03.0 ELEVATED BLOOD PRESSURE READING: ICD-10-CM

## 2024-06-26 DIAGNOSIS — E78.2 MIXED HYPERLIPIDEMIA: ICD-10-CM

## 2024-06-26 RX ORDER — ATORVASTATIN CALCIUM 20 MG/1
20 TABLET, FILM COATED ORAL NIGHTLY
Qty: 90 TABLET | Refills: 0 | Status: SHIPPED | OUTPATIENT
Start: 2024-06-26

## 2024-06-26 RX ORDER — HYDROCHLOROTHIAZIDE 12.5 MG/1
12.5 CAPSULE, GELATIN COATED ORAL DAILY
Qty: 30 CAPSULE | Refills: 0 | Status: SHIPPED | OUTPATIENT
Start: 2024-06-26

## 2024-06-26 RX ORDER — CEFDINIR 300 MG/1
300 CAPSULE ORAL 2 TIMES DAILY
Qty: 14 CAPSULE | Refills: 0 | Status: SHIPPED | OUTPATIENT
Start: 2024-06-26

## 2024-07-12 NOTE — PROGRESS NOTES
Date of Office Visit: 2024  Encounter Provider: HOMERO Valenzuela  Place of Service: [facility]  Patient Name: Mary Banda  :1970    Chief complaint  Chest pain shortness of breath, palpitations, history of atrial tachycardia     History of Present Illness  Patient is a 54 y.o. year old female  patient of Dr. Horn. Past medical history includes anxiety palpitations irritable bowel syndrome who was seen in 2014 for chest pain a stress echocardiogram showed no ischemia with normal systolic function no significant valvular heart disease. She was seen in 3/2019 for palpitations and chest pain. CT angiogram of the chest showed no pulmonary emboli with groundglass infiltrate right lower lobe pulmonary nodules. Aorta was normal in caliber she had a follow-up CT scan 2020 that showed resolution of the filtrate/nodule. An echocardiogram showed normal systolic function normal diastolic function no significant valvular heart disease. A 48-hour monitor was relatively benign with one 13 beat episode of atrial tachycardia. A treadmill exercise stress test was negative for ischemia at 9 METS. In  patient complained of exertional dyspnea and chest pain.     Interval history  Patient presents today for routine follow-up.  I will visit with her for the first time today and have reviewed her medical record.  Since last visit she has started on CPAP and is wearing this consistently.  She denies palpitations, shortness of breath, edema, dizziness, chest pain or chest pressure, fatigue, syncope or presyncope.  Unfortunately she continues to smoke daily and is not motivated to quit.  She is not routinely exercising but is active at home and denies exertional symptoms.  Blood pressure today is well-controlled and she reports similar readings at home and other providers.    Past Medical History:   Diagnosis Date    Abnormal CT of the chest 2019    Recheck CT chest  1/2020 with resolution of abnormality and no new abnormality    Allergic Years    Sulfa    Anxiety     Arthritis     Breast nodule     RIGHT    COVID 12/21/2021    DDD (degenerative disc disease), lumbar     Gallstones     H/O Anxiety     Heart palpitations     AT TIMES    IBS (irritable bowel syndrome)     Low back pain     PONV (postoperative nausea and vomiting)     Seasonal allergies     Visual impairment Couple of months    Vision getting worse, eyelids drooping     Past Surgical History:   Procedure Laterality Date    BREAST BIOPSY Right 8/9/2017    Procedure: RIGHT BREAST BIOPSY WITH NEEDLE LOCALIZATION ;  Surgeon: Enrico Emerson MD;  Location: [facility] OR;  Service:     BREAST BIOPSY Right 03/2017    U/S guided bx- non-atypical ductal hyperplasia    CHOLECYSTECTOMY  2002    ENDOMETRIAL ABLATION W/ NOVASURE  2006    OOPHORECTOMY Left 1994    Part of left ovary removed    TUBAL ABDOMINAL LIGATION  1995     Outpatient Medications Prior to Visit   Medication Sig Dispense Refill    atorvastatin (LIPITOR) 20 MG tablet Take 1 tablet by mouth Every Night. 90 tablet 0    hydroCHLOROthiazide (MICROZIDE) 12.5 MG capsule Take 1 capsule by mouth Daily. 30 capsule 0    ibuprofen (ADVIL,MOTRIN) 400 MG tablet Take 1 tablet by mouth Every 6 (Six) Hours As Needed for Mild Pain.      cefdinir (OMNICEF) 300 MG capsule Take 1 capsule by mouth 2 (Two) Times a Day. 14 capsule 0    fluticasone (FLONASE) 50 MCG/ACT nasal spray 2 sprays into the nostril(s) as directed by provider Daily for 30 days. Administer 2 sprays in each nostril for each dose. (Patient not taking: Reported on 7/16/2024) 16 g 0    metFORMIN (GLUCOPHAGE) 500 MG tablet Take 1 tablet by mouth 2 (Two) Times a Day With Meals. 180 tablet 0     No facility-administered medications prior to visit.       Allergies as of 07/16/2024 - Reviewed 07/16/2024   Allergen Reaction Noted    Sulfa antibiotics Rash 06/28/2016     Social History     Socioeconomic History     Marital status:      Spouse name: Ty    Years of education: College   Tobacco Use    Smoking status: Every Day     Current packs/day: 1.00     Average packs/day: 1 pack/day for 33.5 years (33.5 ttl pk-yrs)     Types: Cigarettes     Start date: 1/1/1991     Passive exposure: Current    Smokeless tobacco: Never    Tobacco comments:     Caffeine - 3-4 diet pepsi   Vaping Use    Vaping status: Never Used   Substance and Sexual Activity    Alcohol use: Not Currently     Comment: social    Drug use: Never    Sexual activity: Yes     Partners: Male     Birth control/protection: Surgical     Comment: Tubal ligation     Family History   Problem Relation Age of Onset    Stroke Father     Hyperlipidemia Father     Heart disease Father     Diabetes Father     Heart attack Father     Arthritis Father     Thyroid disease Mother     Heart disease Mother     Depression Mother     Hypothyroidism Mother     Arthritis Mother     Alcohol abuse Mother     Lupus Sister     Aneurysm Sister     Heart disease Sister     Cerebral aneurysm Sister     Early death Sister         Brain stem aneurysm    Breast cancer Sister     Depression Daughter     Hypertension Daughter     Hypothyroidism Daughter     Heart disease Paternal Grandmother     Lung cancer Paternal Grandmother     Heart disease Maternal Grandmother     Alcohol abuse Maternal Grandmother     Lung cancer Maternal Grandmother     Stroke Maternal Grandmother     Heart disease Maternal Grandfather     Heart attack Maternal Grandfather     Diabetes Paternal Aunt     Diabetes Paternal Uncle     Breast cancer Other     Ovarian cancer Neg Hx     Uterine cancer Neg Hx     Colon cancer Neg Hx      Review of Systems   Constitutional: Negative for malaise/fatigue.   Cardiovascular:  Negative for chest pain, claudication, dyspnea on exertion, leg swelling, near-syncope, orthopnea, palpitations, paroxysmal nocturnal dyspnea and syncope.   Respiratory:  Negative for shortness of breath.   "  Neurological:  Negative for brief paralysis, dizziness, headaches and light-headedness.   All other systems reviewed and are negative.       Objective:     Vitals:    07/16/24 0831   BP: 126/78   BP Location: Right arm   Patient Position: Sitting   Cuff Size: Adult   Pulse: 73   Resp: 16   SpO2: 97%   Weight: 94.3 kg (208 lb)   Height: 157.5 cm (62\")     Body mass index is 38.04 kg/m².    Vitals reviewed.   Constitutional:       General: Not in acute distress.     Appearance: Well-developed and not in distress. Not diaphoretic.   HENT:      Head: Normocephalic.   Pulmonary:      Effort: Pulmonary effort is normal. No respiratory distress.      Breath sounds: Normal breath sounds. No wheezing. No rhonchi. No rales.   Cardiovascular:      Normal rate. Regular rhythm.   Pulses:     Radial: 2+ bilaterally.  Edema:     Peripheral edema absent.   Skin:     General: Skin is warm and dry. There is no cyanosis.      Findings: No rash.   Neurological:      Mental Status: Alert and oriented to person, place, and time.   Psychiatric:         Behavior: Behavior normal. Behavior is cooperative.         Thought Content: Thought content normal.         Judgment: Judgment normal.       Lab Review:     Lab Results   Component Value Date     06/14/2024     02/19/2024    K 4.3 06/14/2024    K 3.8 02/19/2024     06/14/2024    CL 99 02/19/2024    CO2 24 06/14/2024    CO2 26.2 02/19/2024    BUN 13 06/14/2024    BUN 13 02/19/2024    CREATININE 0.95 06/14/2024    CREATININE 0.88 02/19/2024    EGFRIFNONA 77 06/15/2021    EGFRIFNONA 69 02/02/2021    EGFRIFAFRI 93 06/15/2021    EGFRIFAFRI 84 02/02/2021    GLUCOSE 114 (H) 06/14/2024    GLUCOSE 125 (H) 02/19/2024    CALCIUM 9.3 06/14/2024    CALCIUM 9.4 02/19/2024    PROTENTOTREF 6.8 06/14/2024    PROTENTOTREF 7.5 04/18/2023    ALBUMIN 4.0 06/14/2024    ALBUMIN 4.1 02/19/2024    BILITOT 0.3 06/14/2024    BILITOT 0.4 02/19/2024    AST 19 06/14/2024    AST 19 02/19/2024    ALT " "22 06/14/2024    ALT 32 02/19/2024     Lab Results   Component Value Date    WBC 10.5 06/14/2024    WBC 11.19 (H) 02/19/2024    HGB 13.2 06/14/2024    HGB 14.0 02/19/2024    HCT 41.2 06/14/2024    HCT 42.3 02/19/2024    MCV 87 06/14/2024    MCV 86.3 02/19/2024     06/14/2024     02/19/2024     No results found for: \"PROBNP\", \"BNP\"  Lab Results   Component Value Date    CKTOTAL 63 06/14/2024    TROPONINT <6 04/30/2023     Lab Results   Component Value Date    TSH 2.760 06/14/2024    TSH 2.410 02/19/2024      Lipid Panel          2/19/2024    09:02 6/14/2024    08:39   Lipid Panel   Total Cholesterol 224     Total Cholesterol  183    Triglycerides 203  224    HDL Cholesterol 44  43    VLDL Cholesterol 37  38    LDL Cholesterol  143  102    LDL/HDL Ratio 3.17  2.4           ECG 12 Lead    Date/Time: 7/16/2024 8:42 AM  Performed by: Beulah Hale APRN    Authorized by: Beulah Hale APRN  Comparison: compared with previous ECG   Similar to previous ECG  Rhythm: sinus rhythm  Rate: normal  BPM: 73  ST Depression: II, III and aVF  QRS axis: normal  Comments: Similar to prior.        Assessment:       Diagnosis Plan   1. Abnormal EKG        2. PHOENIX (dyspnea on exertion)        3. Obstructive sleep apnea        4. Dyslipidemia        5. History of atrial tachycardia          Plan:       1.  Dyspnea on exertion.  This is progressively more noticeable.  Of note she also has nonspecific ST-T wave changes as well.  Stress echo with no evidence of ischemia.  LVEF was normal.  Dyspnea has improved overall since starting CPAP.  2.  Elevated blood pressure.  Controlled on current regimen.  3.  Hyperlipidemia.  Poorly controlled and now on statin therapy.  4.  Atrial tachycardia, brief episodes in the past.  Remains asymptomatic  5.  Elevated glucose and probable prediabetes.  As above  6.  Edema. Improved on HCTZ for the past month.  Minimal on exam.  Continue current regimen.  If elevated blood sugars " continue to be an issue could consider alternate regimen.  7.  Diabetes, on metformin though have struggling with this with abdominal pain and cramps.  She has a call out to Dr. Felipe.  8.  Probable sleep apnea with Snoring and daytime somolence.  Now on CPAP.  Follows with sleep medicine with appointment upcoming in August      Time Spent: I spent 30 minutes caring for Mary on this date of service. This time includes time spent by me in the following activities: preparing for the visit, reviewing tests, performing a medically appropriate examination and/or evaluations, counseling and educating the patient/family/caregiver, ordering medications, tests, or procedures, documenting information in the medical record, and independently interpreting results and communicating that information with the patient/family/caregiver.   I spent 1 minutes on the separately reported service of ECG. This time is not included in the time used to support the E/M service also reported today.        Your medication list            Accurate as of July 16, 2024  8:43 AM. If you have any questions, ask your nurse or doctor.                CONTINUE taking these medications        Instructions Last Dose Given Next Dose Due   atorvastatin 20 MG tablet  Commonly known as: LIPITOR      Take 1 tablet by mouth Every Night.       hydroCHLOROthiazide 12.5 MG capsule  Commonly known as: MICROZIDE      Take 1 capsule by mouth Daily.       ibuprofen 400 MG tablet  Commonly known as: ADVIL,MOTRIN      Take 1 tablet by mouth Every 6 (Six) Hours As Needed for Mild Pain.                Patient is no longer taking -.  I corrected the med list to reflect this.  I did not stop these medications.    No follow-ups on file.      Dictated utilizing Dragon dictation

## 2024-07-16 ENCOUNTER — OFFICE VISIT (OUTPATIENT)
Dept: CARDIOLOGY | Facility: CLINIC | Age: 54
End: 2024-07-16
Payer: COMMERCIAL

## 2024-07-16 VITALS
BODY MASS INDEX: 38.28 KG/M2 | RESPIRATION RATE: 16 BRPM | DIASTOLIC BLOOD PRESSURE: 78 MMHG | WEIGHT: 208 LBS | HEART RATE: 73 BPM | SYSTOLIC BLOOD PRESSURE: 126 MMHG | HEIGHT: 62 IN | OXYGEN SATURATION: 97 %

## 2024-07-16 DIAGNOSIS — E78.5 DYSLIPIDEMIA: ICD-10-CM

## 2024-07-16 DIAGNOSIS — R06.09 DOE (DYSPNEA ON EXERTION): ICD-10-CM

## 2024-07-16 DIAGNOSIS — G47.33 OBSTRUCTIVE SLEEP APNEA: ICD-10-CM

## 2024-07-16 DIAGNOSIS — Z86.79 HISTORY OF ATRIAL TACHYCARDIA: ICD-10-CM

## 2024-07-16 DIAGNOSIS — R94.31 ABNORMAL EKG: Primary | ICD-10-CM

## 2024-07-16 PROCEDURE — 99214 OFFICE O/P EST MOD 30 MIN: CPT | Performed by: NURSE PRACTITIONER

## 2024-07-16 PROCEDURE — 93000 ELECTROCARDIOGRAM COMPLETE: CPT | Performed by: NURSE PRACTITIONER

## 2024-08-06 ENCOUNTER — OFFICE VISIT (OUTPATIENT)
Dept: SLEEP MEDICINE | Facility: HOSPITAL | Age: 54
End: 2024-08-06
Payer: COMMERCIAL

## 2024-08-06 VITALS
OXYGEN SATURATION: 99 % | DIASTOLIC BLOOD PRESSURE: 75 MMHG | SYSTOLIC BLOOD PRESSURE: 124 MMHG | HEIGHT: 62 IN | WEIGHT: 209 LBS | HEART RATE: 78 BPM | BODY MASS INDEX: 38.46 KG/M2

## 2024-08-06 DIAGNOSIS — Z78.9 DIFFICULTY WITH CPAP USE: ICD-10-CM

## 2024-08-06 DIAGNOSIS — G47.33 OBSTRUCTIVE SLEEP APNEA, ADULT: Primary | ICD-10-CM

## 2024-08-06 DIAGNOSIS — E66.9 OBESITY (BMI 30-39.9): ICD-10-CM

## 2024-08-06 PROCEDURE — G0463 HOSPITAL OUTPT CLINIC VISIT: HCPCS

## 2024-08-06 NOTE — PROGRESS NOTES
[facility]  [address]  [place] [id number] [phone]    PCP: Katie Felipe PA    Reason for visit:  Sleep disorders: KAVITHA    Mary is a 54 y.o.female who was seen in the [facility] today. She was setup with CPAP and here to review. She is doing well with it. Sleeps from 11:30pm to 6am. Using nasal mask. Sometimes spouse notices snoring. Occasionally mask is off in morning. She has difficulty sleeping on her back because air pr feels too much. She is more rested in mornings and no EDS.  Smokes 3/4 ppd. Previously 1ppd, cut down 6 mths ago. Started smoking age 20.  Zaleski Sleepiness Scale is 4. Caffeine 3 per day. Alcohol 0 per week.    Mary  reports that she has been smoking cigarettes. She started smoking about 33 years ago. She has a 33.6 pack-year smoking history. She has been exposed to tobacco smoke. She has never used smokeless tobacco.    Pertinent Positive Review of Systems of nasal congestion, soa  Rest of Review of Systems was negative as recorded in Sleep Questionnaire.    Patient  has a past medical history of Abnormal CT of the chest (03/24/2019), Allergic (Years), Anxiety, Arthritis, Breast nodule, COVID (12/21/2021), DDD (degenerative disc disease), lumbar, Gallstones, H/O Anxiety, Heart palpitations, IBS (irritable bowel syndrome), Low back pain, PONV (postoperative nausea and vomiting), Seasonal allergies, and Visual impairment (Couple of months).     Current Medications:    Current Outpatient Medications:     atorvastatin (LIPITOR) 20 MG tablet, Take 1 tablet by mouth Every Night., Disp: 90 tablet, Rfl: 0    hydroCHLOROthiazide (MICROZIDE) 12.5 MG capsule, Take 1 capsule by mouth Daily., Disp: 30 capsule, Rfl: 0    ibuprofen (ADVIL,MOTRIN) 400 MG tablet, Take 1 tablet by mouth Every 6 (Six) Hours As Needed for Mild Pain., Disp: , Rfl:    also entered in Sleep Questionnaire         Vital Signs: /75   Pulse 78   Ht 157.5 cm  "(62\")   Wt 94.8 kg (209 lb)   SpO2 99%   BMI 38.23 kg/m²     Body mass index is 38.23 kg/m².       Tongue: Large       Dentition: good       Pharynx: Posterior pharyngeal pillars are narrow   Mallampatti: III (soft and hard palate and base of uvula visible)        General: Alert. Cooperative. Well developed. No acute distress.             Head:  Normocephalic. Symmetrical. Atraumatic.              Nose: No septal deviation. No drainage.          Throat: No oral lesions. No thrush. Moist mucous membranes.    Chest Wall:  Normal shape. Symmetric expansion with respiration. No tenderness.             Neck:  Trachea midline.           Lungs:  Clear to auscultation bilaterally. No wheezes. No rhonchi. No rales. Respirations regular, even and unlabored.            Heart:  Regular rhythm and normal rate. Normal S1 and S2. No murmur.     Abdomen:  Soft, non-tender and non-distended. Normal bowel sounds. No masses.  Extremities:  Moves all extremities well. No edema.    Psychiatric: Normal mood and affect.    Diagnostic data available to date is as below and was reviewed on current visit:  4/24/24: The patient had 46 obstructive events and 55 partial obstructive events. AHI/YUNIOR for total monitoring time is mildly abnormal at 12.8 events per hour.  When supine for 161.3 minutes, AHI moderately abnormal at 20.6 events per hour.  On her left side for 197.5 minutes, AHI normal.  However, on her right side for 115.7 minutes, AHI of moderately abnormal at 16.4 events per hour.     Lowest oxygen saturation is 85% and no sleep-related hypoxia noted.    Lab Results   Component Value Date    IRON 99 02/19/2024    TIBC 315 06/14/2024    FERRITIN 88 06/14/2024       Most current available usage data reviewed on 08/06/2024:        DME Company: [employer]    Prescription to [employer] for replacement supplies as below:    nasal mask      Description Replacement    Nasal PILLOWS      A 7034 Nasal Pillows  every 3 mth    A 7024 Repl Nasal Pillows  " 2 per mth    Nasal MASK/CUSHION     x A 7034 Nasal Mask/Cushion  every 3 mth   x A 7032 Repl Nasal Mask/Cushion  2 per mth    Full Face MASK      A 7030 Full Face Mask  every 3 mth    A 7031 Repl Face Mask  1 per mth      A 4604 Heated Tubing  every 3 mth    A 7037 Standard Tubing  every 3 mth   x A 7035 Headgear  every 3 mth   x A 7046 Repl Humidifier Chamber  every 6 yrs   x A 7038 Disposable Filters  2 per mth   x A 7039 Non-disposable Filter  every 6 mth   x A 7036 Chin Strap  every 6 mth     Orders Placed This Encounter   Procedures    Pulmonary Results Scan          Impression:  1. Obstructive sleep apnea, adult    2. Obesity (BMI 30-39.9)    3. Difficulty with CPAP use        Plan:  Mary will change settings to auto 12-16; likely needs higher pr in supine and CPAP not responding quickly enough. If calls back with too high then change back to 5-15.    I reiterated the importance of effective treatment of obstructive sleep apnea with PAP machine.  Cardiovascular health risks of untreated sleep apnea were again reviewed.  Patient was asked to remain cautious if there is persistent hypersomnolence. The benefit of weight loss in reducing severity of obstructive sleep apnea was discussed.  Patient would benefit from adhering to a strict diet to achieve ideal BMI.     Change of PAP supplies regularly is important for effective use.  Change of cushion on the mask or plugs on nasal pillows along with disposable filters once every month and change of mask frame, tubing, headgear and Velcro straps every 6 months at the minimum was reiterated.    This patient is compliant with PAP machine and benefits from its use.  Apnea hypopneas index is corrected/improved.  Daytime hypersomnolence has resolved.     Patient will follow up in this clinic in 6 months  APRN    Thank you for allowing me to participate in your patient's care.    Electronically signed by Kar Abebe MD, 08/06/24, 9:41 AM EDT.    Part of this note may be an  electronic transcription/translation of spoken language to printed text using the Dragon Dictation System.

## 2024-08-22 DIAGNOSIS — R03.0 ELEVATED BLOOD PRESSURE READING: ICD-10-CM

## 2024-08-22 RX ORDER — HYDROCHLOROTHIAZIDE 12.5 MG/1
12.5 CAPSULE, GELATIN COATED ORAL DAILY
Qty: 30 CAPSULE | Refills: 0 | Status: SHIPPED | OUTPATIENT
Start: 2024-08-22

## 2024-08-22 NOTE — TELEPHONE ENCOUNTER
See result note. Patient to follow up in 3 months from 6/2024 appt. Please schedule her for follow up with me in the middle to end of September for her 3 month follow up.

## 2024-10-11 DIAGNOSIS — R03.0 ELEVATED BLOOD PRESSURE READING: ICD-10-CM

## 2024-10-14 RX ORDER — HYDROCHLOROTHIAZIDE 12.5 MG/1
12.5 CAPSULE ORAL DAILY
Qty: 14 CAPSULE | Refills: 0 | Status: SHIPPED | OUTPATIENT
Start: 2024-10-14

## 2024-10-14 NOTE — TELEPHONE ENCOUNTER
See result note and previous message. Please call the patient to get her scheduled with me.     Me to [facility]   8/22/24 10:30 AM Note      See result note. Patient to follow up in 3 months from 6/2024 appt. Please schedule her for follow up with me in the middle to end of September for her 3 month follow up.

## 2024-10-24 ENCOUNTER — HOSPITAL ENCOUNTER (OUTPATIENT)
Dept: MRI IMAGING | Facility: HOSPITAL | Age: 54
Discharge: HOME | End: 2024-10-24
Admitting: SURGERY
Payer: COMMERCIAL

## 2024-10-24 DIAGNOSIS — Z91.89 AT HIGH RISK FOR BREAST CANCER: ICD-10-CM

## 2024-10-24 PROCEDURE — 77049 MRI BREAST C-+ W/CAD BI: CPT

## 2024-10-24 PROCEDURE — A9577 INJ MULTIHANCE: HCPCS | Performed by: SURGERY

## 2024-10-24 PROCEDURE — 0 GADOBENATE DIMEGLUMINE 529 MG/ML SOLUTION: Performed by: SURGERY

## 2024-10-24 RX ADMIN — GADOBENATE DIMEGLUMINE 20 ML: 529 INJECTION, SOLUTION INTRAVENOUS at 16:05

## 2024-10-25 ENCOUNTER — DOCUMENTATION (OUTPATIENT)
Dept: MAMMOGRAPHY | Facility: CLINIC | Age: 54
End: 2024-10-25
Payer: COMMERCIAL

## 2024-12-16 ENCOUNTER — OFFICE VISIT (OUTPATIENT)
Dept: FAMILY MEDICINE CLINIC | Facility: CLINIC | Age: 54
End: 2024-12-16
Payer: COMMERCIAL

## 2024-12-16 VITALS
HEIGHT: 63 IN | HEART RATE: 87 BPM | SYSTOLIC BLOOD PRESSURE: 124 MMHG | BODY MASS INDEX: 36.14 KG/M2 | WEIGHT: 204 LBS | OXYGEN SATURATION: 96 % | DIASTOLIC BLOOD PRESSURE: 76 MMHG

## 2024-12-16 DIAGNOSIS — Z00.00 HEALTH MAINTENANCE EXAMINATION: Primary | ICD-10-CM

## 2024-12-16 DIAGNOSIS — E11.65 TYPE 2 DIABETES MELLITUS WITH HYPERGLYCEMIA, WITHOUT LONG-TERM CURRENT USE OF INSULIN: ICD-10-CM

## 2024-12-16 DIAGNOSIS — Z78.0 POSTMENOPAUSAL: ICD-10-CM

## 2024-12-16 DIAGNOSIS — E78.2 MIXED HYPERLIPIDEMIA: ICD-10-CM

## 2024-12-16 DIAGNOSIS — R03.0 ELEVATED BLOOD PRESSURE READING: ICD-10-CM

## 2024-12-16 DIAGNOSIS — T38.3X5A ADVERSE EFFECT OF METFORMIN, INITIAL ENCOUNTER: ICD-10-CM

## 2024-12-16 DIAGNOSIS — R60.0 PEDAL EDEMA: ICD-10-CM

## 2024-12-16 PROCEDURE — 99396 PREV VISIT EST AGE 40-64: CPT | Performed by: NURSE PRACTITIONER

## 2024-12-16 RX ORDER — ATORVASTATIN CALCIUM 20 MG/1
20 TABLET, FILM COATED ORAL NIGHTLY
Qty: 90 TABLET | Refills: 3 | Status: SHIPPED | OUTPATIENT
Start: 2024-12-16

## 2024-12-16 RX ORDER — HYDROCHLOROTHIAZIDE 12.5 MG/1
12.5 CAPSULE ORAL DAILY
Qty: 90 CAPSULE | Refills: 3 | Status: SHIPPED | OUTPATIENT
Start: 2024-12-16

## 2024-12-16 RX ORDER — ONDANSETRON 4 MG/1
4 TABLET, ORALLY DISINTEGRATING ORAL EVERY 6 HOURS PRN
Qty: 20 TABLET | Refills: 1 | Status: SHIPPED | OUTPATIENT
Start: 2024-12-16

## 2024-12-16 NOTE — PROGRESS NOTES
"Chief Complaint  Establish Care and Annual Exam    Subjective        Mary Banda presents to [facility]  History of Present Illness  Very pleasant patient here today needs a new PCP, I see her , like to get established and have a physical she is doing well presently  She takes atorvastatin for cholesterol 20 mg daily, as well as she has had some dependent edema and takes a low-dose HCTZ hydrochlorothiazide 12.5 mg daily  No history of hypertension, occasionally is up a little bit but she trends in normal range.    Up-to-date with breast surgeon she has increased risk of breast cancer,  Will schedule her appointment with GYN she stays on top of her Pap excetra mammogram,    History ,  Positive tobacco 1/2 pack/day  No drugs, no alcohol,  Patient is a nurse                          Objective   Vital Signs:  /76 (BP Location: Left arm, Patient Position: Sitting, Cuff Size: Large Adult)   Pulse 87   Ht 160 cm (63\")   Wt 92.5 kg (204 lb)   SpO2 96%   BMI 36.14 kg/m²   Estimated body mass index is 36.14 kg/m² as calculated from the following:    Height as of this encounter: 160 cm (63\").    Weight as of this encounter: 92.5 kg (204 lb).            Physical Exam  Vitals reviewed.   Constitutional:       General: She is not in acute distress.     Appearance: Normal appearance. She is well-developed. She is not ill-appearing, toxic-appearing or diaphoretic.   HENT:      Head: Normocephalic.      Nose: Nose normal.   Eyes:      General: No scleral icterus.     Conjunctiva/sclera: Conjunctivae normal.      Pupils: Pupils are equal, round, and reactive to light.   Neck:      Thyroid: No thyromegaly.      Vascular: No JVD.      Comments: Is clear thyroid normal  Cardiovascular:      Rate and Rhythm: Normal rate and regular rhythm.      Heart sounds: Normal heart sounds. No murmur heard.     No friction rub. No gallop.   Pulmonary:      Effort: Pulmonary effort is normal. " No respiratory distress.      Breath sounds: Normal breath sounds. No stridor. No wheezing or rales.   Abdominal:      General: Bowel sounds are normal. There is no distension.      Palpations: Abdomen is soft.      Tenderness: There is no abdominal tenderness.      Comments: No hepatosplenomegaly, no ascites,   Musculoskeletal:         General: Swelling present. No tenderness.      Cervical back: Neck supple.      Comments: Trace to mild tibial edema no calf tenderness   Lymphadenopathy:      Cervical: No cervical adenopathy.   Skin:     General: Skin is warm and dry.      Findings: No erythema or rash.   Neurological:      General: No focal deficit present.      Mental Status: She is alert and oriented to person, place, and time.      Deep Tendon Reflexes: Reflexes are normal and symmetric.   Psychiatric:         Behavior: Behavior normal.         Thought Content: Thought content normal.         Judgment: Judgment normal.        Result Review :                Assessment and Plan   Diagnoses and all orders for this visit:    1. Health maintenance examination (Primary)    2. Elevated blood pressure reading  -     hydroCHLOROthiazide (MICROZIDE) 12.5 MG capsule; Take 1 capsule by mouth Daily.  Dispense: 90 capsule; Refill: 3    3. Mixed hyperlipidemia  -     atorvastatin (LIPITOR) 20 MG tablet; Take 1 tablet by mouth Every Night.  Dispense: 90 tablet; Refill: 3    4. Pedal edema    5. Postmenopausal  -     DEXA Bone Density Axial    6. Type 2 diabetes mellitus with hyperglycemia, without long-term current use of insulin  -     Tirzepatide 2.5 MG/0.5ML solution auto-injector; Inject 2.5 mg under the skin into the appropriate area as directed 1 (One) Time Per Week.  Dispense: 2 mL; Refill: 0  -     Ambulatory Referral to Diabetic Education    7. Adverse effect of metformin, initial encounter    Other orders  -     ondansetron ODT (ZOFRAN-ODT) 4 MG disintegrating tablet; Place 1 tablet on the tongue Every 6 (Six) Hours  As Needed for Nausea or Vomiting.  Dispense: 20 tablet; Refill: 1             Follow Up   Return in about 6 months (around 6/16/2025), or Outpatient labs, for Labs before next visit.  Patient was given instructions and counseling regarding her condition or for health maintenance advice. Please see specific information pulled into the AVS if appropriate.     Patient Instructions   Discharge instructions, continue healthy diet and exercise  Lots of vegetables vegetable vegetables decrease breads Posta sweets processed foods substantially, more protein, chicken fish, beans, 64 ounces water fluids daily, try to break a sweat something fun daily  People places and things,    Continue present care, outpatient lab very soon, get the lab before you start your medication please, and call me for results, as long as you are doing well see back in 6 months possibly sooner depending on how you are doing with Mounjaro    5 for these medications if is not covered let me know so I can transition you to Ozempic or       Reviewed black box warnings, patient is no family history of medullary thyroid cancer multiple endocrine dysplasia and syndrome type II discussed potential adverse reaction as well as strategies to mitigate risk,  Vomiting pain  Unacceptable, prophylaxis, to prevent constipation as above,    Need for follow-up, gradual increase,  Check glucose daily as needed, continue present care healthy diet  Follow-up 6 months as long as you are doing well update mom     medtronic PPM  generator change tuesday 9/7 if inr < 1.8 by DR. Amparo CLOUD after midnight monday medtronic PPM  s/p generator change tuesday 9/7 with Dr. Christensen

## 2024-12-16 NOTE — PATIENT INSTRUCTIONS
Discharge instructions, continue healthy diet and exercise  Lots of vegetables vegetable vegetables decrease breads Posta sweets processed foods substantially, more protein, chicken fish, beans, 64 ounces water fluids daily, try to break a sweat something fun daily  People places and things,    Continue present care, outpatient lab very soon, get the lab before you start your medication please, and call me for results, as long as you are doing well see back in 6 months possibly sooner depending on how you are doing with Mounjaro    5 for these medications if is not covered let me know so I can transition you to Ozempic or

## 2024-12-20 ENCOUNTER — PATIENT ROUNDING (BHMG ONLY) (OUTPATIENT)
Dept: FAMILY MEDICINE CLINIC | Facility: CLINIC | Age: 54
End: 2024-12-20
Payer: COMMERCIAL

## 2024-12-20 NOTE — PROGRESS NOTES
A My-Chart message has been sent to the patient for [place] [facility] [place]

## 2025-01-03 ENCOUNTER — NURSE TRIAGE (OUTPATIENT)
Dept: CALL CENTER | Facility: HOSPITAL | Age: 55
End: 2025-01-03
Payer: COMMERCIAL

## 2025-01-03 NOTE — TELEPHONE ENCOUNTER
Transferred call to [facility] to speak with charge nurse since 4 days have passed already needs to see if can be done today.

## 2025-01-03 NOTE — TELEPHONE ENCOUNTER
Reason for Disposition   [1] Caller has URGENT medicine question about med that PCP or specialist prescribed AND [2] triager unable to answer question    Additional Information   Negative: [1] Intentional drug overdose AND [2] suicidal thoughts or ideas   Negative: Drug overdose and triager unable to answer question   Negative: Caller requesting a renewal or refill of a medicine patient is currently taking   Negative: Caller requesting information unrelated to medicine   Negative: Caller requesting information about COVID-19 Vaccine   Negative: Caller requesting information about Emergency Contraception   Negative: Caller requesting information about Combined Birth Control Pills   Negative: Caller requesting information about Progestin Birth Control Pills   Negative: Caller requesting information about Post-Op pain or medicines   Negative: Caller requesting a prescription antibiotic (such as Penicillin) for Strep throat and has a positive culture result   Negative: Caller requesting a prescription anti-viral med (such as Tamiflu) and has influenza (flu) symptoms   Negative: Immunization reaction suspected   Negative: Rash while taking a medicine or within 3 days of stopping it   Negative: [1] Asthma and [2] having symptoms of asthma (cough, wheezing, etc.)   Negative: [1] Symptom of illness (e.g., headache, abdominal pain, earache, vomiting) AND [2] more than mild   Negative: Breastfeeding questions about mother's medicines and diet   Negative: MORE THAN A DOUBLE DOSE of a prescription or over-the-counter (OTC) drug   Negative: [1] DOUBLE DOSE (an extra dose or lesser amount) of prescription drug AND [2] any symptoms (e.g., dizziness, nausea, pain, sleepiness)   Negative: [1] DOUBLE DOSE (an extra dose or lesser amount) of over-the-counter (OTC) drug AND [2] any symptoms (e.g., dizziness, nausea, pain, sleepiness)   Negative: Took another person's prescription drug   Negative: [1] DOUBLE DOSE (an extra dose or  "lesser amount) of prescription drug AND [2] NO symptoms  (Exception: A double dose of antibiotics.)   Negative: Diabetes drug error or overdose (e.g., took wrong type of insulin or took extra dose)   Negative: [1] Prescription not at pharmacy AND [2] was prescribed by PCP recently (Exception: Triager has access to EMR and prescription is recorded there. Go to Home Care and confirm for pharmacy.)   Negative: [1] Pharmacy calling with prescription question AND [2] triager unable to answer question   Negative: Medicine patch causing local rash or itching   Negative: [1] Caller has medicine question about med NOT prescribed by PCP AND [2] triager unable to answer question (e.g., compatibility with other med, storage)   Negative: Prescription request for new medicine (not a refill)   Negative: [1] Caller has NON-URGENT medicine question about med that PCP prescribed AND [2] triager unable to answer question   Negative: Caller wants to use a complementary or alternative medicine   Negative: [1] Prescription prescribed recently is not at pharmacy AND [2] triager has access to patient's EMR AND [3] prescription is recorded in the EMR   Negative: [1] DOUBLE DOSE (an extra dose or lesser amount) of over-the-counter (OTC) drug AND [2] NO symptoms   Negative: [1] DOUBLE DOSE (an extra dose or lesser amount) of antibiotic drug AND [2] NO symptoms   Negative: Caller has medicine question only, adult not sick, AND triager answers question   Negative: Caller has medicine question, adult has minor symptoms, caller declines triage, AND triager answers question   Negative: Caller requesting information about medicine during pregnancy; adult is not ill AND triager answers question    Answer Assessment - Initial Assessment Questions  1. NAME of MEDICINE: \"What medicine(s) are you calling about?\"      Request antibiotic for UTI according to her labs results from  12/30 she has UTI and culture showed this as well    2. QUESTION: \"What is " "your question?\" (e.g., double dose of medicine, side effect)      See above  3. PRESCRIBER: \"Who prescribed the medicine?\" Reason: if prescribed by specialist, call should be referred to that group.      Saw Dr Edgardo Barnes  4. SYMPTOMS: \"Do you have any symptoms?\" If Yes, ask: \"What symptoms are you having?\"  \"How bad are the symptoms (e.g., mild, moderate, severe)      Back pain  5. PREGNANCY:  \"Is there any chance that you are pregnant?\" \"When was your last menstrual period?\"      na    Protocols used: Medication Question Call-ADULT-AH    "

## 2025-01-22 ENCOUNTER — HOSPITAL ENCOUNTER (OUTPATIENT)
Dept: BONE DENSITY | Facility: HOSPITAL | Age: 55
Discharge: HOME | End: 2025-01-22
Admitting: NURSE PRACTITIONER
Payer: COMMERCIAL

## 2025-01-22 ENCOUNTER — OFFICE VISIT (OUTPATIENT)
Dept: CARDIOLOGY | Facility: CLINIC | Age: 55
End: 2025-01-22
Payer: COMMERCIAL

## 2025-01-22 VITALS
DIASTOLIC BLOOD PRESSURE: 80 MMHG | HEART RATE: 68 BPM | OXYGEN SATURATION: 98 % | SYSTOLIC BLOOD PRESSURE: 122 MMHG | WEIGHT: 194 LBS | HEIGHT: 63 IN | BODY MASS INDEX: 34.38 KG/M2 | RESPIRATION RATE: 18 BRPM

## 2025-01-22 DIAGNOSIS — E78.2 MIXED HYPERLIPIDEMIA: Primary | ICD-10-CM

## 2025-01-22 DIAGNOSIS — R73.03 PREDIABETES: ICD-10-CM

## 2025-01-22 PROCEDURE — 99213 OFFICE O/P EST LOW 20 MIN: CPT | Performed by: INTERNAL MEDICINE

## 2025-01-22 PROCEDURE — 77080 DXA BONE DENSITY AXIAL: CPT

## 2025-01-22 PROCEDURE — 93000 ELECTROCARDIOGRAM COMPLETE: CPT | Performed by: INTERNAL MEDICINE

## 2025-01-22 NOTE — PROGRESS NOTES
Date of Office Visit: 2025  Encounter Provider: Carmen Horn MD  Place of Service: [facility] CARDIOLOGY  Patient Name: Mary Banda  :1970    Chief complaint  Paroxysmal atrial tachycardia    History of Present Illness  Patient is a 54-year-old female with history of anxiety palpitations irritable bowel syndrome who was seen in 2014 for chest pain a stress echocardiogram showed no ischemia with normal systolic function no significant valvular heart disease.  She was seen in 3/2019 for palpitations and chest pain.  CT angiogram of the chest showed no pulmonary emboli with groundglass infiltrate right lower lobe pulmonary nodules.  Aorta was normal in caliber she had a follow-up CT scan 2020 that showed resolution of the filtrate/nodule.  An echocardiogram showed normal systolic function normal diastolic function no significant valvular heart disease.  A 48-hour monitor was relatively benign with one 13 beat episode of atrial tachycardia.  A treadmill exercise stress test was negative for ischemia at 9 METS.  In  patient complained of exertional dyspnea and chest pain.  Stress echo was ordered but she did not complete this.    Since last visit she has been using CPAP consistently with dry mouth..  She is not exercising.  She has had no shortness of breath edema dizziness chest pain or fatigue.  She has palpitations occur randomly but easily resolved.  They occur about 3-4 times a week.  Edema is controlled with the use of diuretics 3 times a week.  She believes she is walking 8-10,000 steps a day.    Past Medical History:   Diagnosis Date    Abnormal CT of the chest 2019    Recheck CT chest 2020 with resolution of abnormality and no new abnormality    Allergic Years    Sulfa    Anxiety     Arthritis     Breast nodule     RIGHT    COVID 2021    DDD (degenerative disc disease), lumbar     Diabetes mellitus     Prediabetic    Gallstones     H/O Anxiety      Heart palpitations     AT TIMES    Hyperlipidemia 2023    IBS (irritable bowel syndrome)     Low back pain     PONV (postoperative nausea and vomiting)     Seasonal allergies     Visual impairment Couple of months    Vision getting worse, eyelids drooping     Past Surgical History:   Procedure Laterality Date    BREAST BIOPSY Right 8/9/2017    Procedure: RIGHT BREAST BIOPSY WITH NEEDLE LOCALIZATION ;  Surgeon: Enrico Emerson MD;  Location: [facility];  Service:     BREAST BIOPSY Right 03/2017    U/S guided bx- non-atypical ductal hyperplasia    CHOLECYSTECTOMY  2002    ENDOMETRIAL ABLATION W/ NOVASURE  2006    OOPHORECTOMY Left 1994    Part of left ovary removed    TUBAL ABDOMINAL LIGATION  1995     Outpatient Medications Prior to Visit   Medication Sig Dispense Refill    atorvastatin (LIPITOR) 20 MG tablet Take 1 tablet by mouth Every Night. 90 tablet 3    hydroCHLOROthiazide (MICROZIDE) 12.5 MG capsule Take 1 capsule by mouth Daily. 90 capsule 3    ibuprofen (ADVIL,MOTRIN) 400 MG tablet Take 1 tablet by mouth Every 6 (Six) Hours As Needed for Mild Pain.      ondansetron ODT (ZOFRAN-ODT) 4 MG disintegrating tablet Place 1 tablet on the tongue Every 6 (Six) Hours As Needed for Nausea or Vomiting. 20 tablet 1    Tirzepatide 5 MG/0.5ML solution auto-injector Inject 5 mg under the skin into the appropriate area as directed 1 (One) Time Per Week. 2 mL 0    diclofenac (VOLTAREN) 75 MG EC tablet Take 1 tablet by mouth 2 (Two) Times a Day As Needed (back pain). 30 tablet 0     No facility-administered medications prior to visit.       Allergies as of 01/22/2025 - Reviewed 01/22/2025   Allergen Reaction Noted    Metformin GI Intolerance 12/16/2024    Sulfa antibiotics Rash 06/28/2016     Social History     Socioeconomic History    Marital status:      Spouse name: Ty    Years of education: College   Tobacco Use    Smoking status: Every Day     Current packs/day: 1.00     Average packs/day: 1 pack/day  for 34.1 years (34.1 ttl pk-yrs)     Types: Cigarettes     Start date: 1/1/1991     Passive exposure: Current    Smokeless tobacco: Never    Tobacco comments:     Caffeine - 3-4 diet pepsi   Vaping Use    Vaping status: Never Used   Substance and Sexual Activity    Alcohol use: Not Currently     Comment: social    Drug use: Never    Sexual activity: Yes     Partners: Male     Birth control/protection: Tubal ligation, Surgical     Comment: Tubal ligation     Family History   Problem Relation Age of Onset    Stroke Father     Hyperlipidemia Father     Heart disease Father     Diabetes Father     Heart attack Father     Arthritis Father     Hypertension Father     Thyroid disease Mother     Heart disease Mother     Depression Mother     Hypothyroidism Mother     Arthritis Mother     Alcohol abuse Mother     Lupus Sister     Aneurysm Sister     Heart disease Sister     Cerebral aneurysm Sister     Early death Sister         Brain stem aneurysm    Stroke Sister         Cva brainstem age 23    Breast cancer Sister     Depression Daughter     Hypertension Daughter     Hypothyroidism Daughter     Heart disease Paternal Grandmother     Lung cancer Paternal Grandmother     Heart disease Maternal Grandmother     Alcohol abuse Maternal Grandmother     Lung cancer Maternal Grandmother     Stroke Maternal Grandmother     Heart disease Maternal Grandfather     Heart attack Maternal Grandfather     Diabetes Paternal Aunt     Diabetes Paternal Uncle     Breast cancer Other     Ovarian cancer Neg Hx     Uterine cancer Neg Hx     Colon cancer Neg Hx      Review of Systems   Constitutional: Positive for weight loss (Is intentionally lost to start 19 pounds over the past 6 months on her own.  Over the past 5 weeks she has also been on Mounjaro). Negative for chills, fever and weight gain.   Cardiovascular:  Negative for leg swelling.   Respiratory:  Negative for cough, snoring and wheezing.    Hematologic/Lymphatic: Negative for  "bleeding problem. Does not bruise/bleed easily.   Skin:  Negative for color change.   Musculoskeletal:  Negative for falls, joint pain and myalgias.   Gastrointestinal:  Negative for melena.   Genitourinary:  Negative for hematuria.   Neurological:  Negative for excessive daytime sleepiness.   Psychiatric/Behavioral:  Negative for depression. The patient is not nervous/anxious.         Objective:     Vitals:    01/22/25 1008   BP: 122/80   Pulse: 68   Resp: 18   SpO2: 98%   Weight: 88 kg (194 lb)   Height: 160 cm (63\")     Body mass index is 34.37 kg/m².    Vitals reviewed.   Constitutional:       Appearance: Well-developed. Obese.   Eyes:      General: No scleral icterus.        Right eye: No discharge.      Conjunctiva/sclera: Conjunctivae normal.      Pupils: Pupils are equal, round, and reactive to light.   HENT:      Head: Normocephalic.      Nose: Nose normal.   Neck:      Thyroid: No thyromegaly.      Vascular: No JVD.   Pulmonary:      Effort: Pulmonary effort is normal. No respiratory distress.      Breath sounds: Normal breath sounds. No wheezing. No rales.   Cardiovascular:      Normal rate. Regular rhythm. Normal S1. Normal S2.       Murmurs: There is no murmur.      No gallop.    Pulses:     Intact distal pulses.      Carotid: 2+ bilaterally.     Radial: 2+ bilaterally.     Femoral: 2+ bilaterally.     Popliteal: 2+ bilaterally.     Dorsalis pedis: 2+ bilaterally.     Posterior tibial: 2+ bilaterally.  Edema:     Peripheral edema absent.   Abdominal:      General: Bowel sounds are normal. There is no distension.      Palpations: Abdomen is soft.      Tenderness: There is no abdominal tenderness. There is no rebound.   Musculoskeletal: Normal range of motion.         General: No tenderness.      Cervical back: Normal range of motion and neck supple. Skin:     General: Skin is warm and dry.      Findings: No erythema or rash.   Neurological:      Mental Status: Alert and oriented to person, place, and " time.   Psychiatric:         Behavior: Behavior normal.         Thought Content: Thought content normal.         Judgment: Judgment normal.       Lab Review:   Lab Results - Last 18 Months   Lab Units 06/14/24  0839 02/19/24  0902   WBC x10E3/uL 10.5 11.19*   RBC x10E6/uL 4.76 4.90   HEMOGLOBIN g/dL 13.2 14.0   HEMATOCRIT % 41.2 42.3   MCV fL 87 86.3   MCH pg 27.7 28.6   MCHC g/dL 32.0 33.1   RDW % 13.5 13.6   PLATELETS x10E3/uL 335 363   NEUTROPHIL % % 54 56.8   LYMPHOCYTE % % 35 33.4   MONOCYTES % % 8 6.9   EOSINOPHIL % % 2 1.6   BASOPHIL % % 1 0.7   NEUTROS ABS x10E3/uL 5.8 6.35   LYMPHS ABS x10E3/uL 3.7* 3.74*   MONOS ABS x10E3/uL 0.8 0.77   EOS ABS x10E3/uL 0.2 0.18   BASOS ABS x10E3/uL 0.1 0.08   IMM GRAN % % 0  --    IMMATURE GRANS (ABS) x10E3/uL 0.0  --    RDW-SD fl  --  42.1   MPV fL  --  10.3       Lab Results - Last 18 Months   Lab Units 06/14/24  0839 02/19/24  0902   GLUCOSE mg/dL 114* 125*   BUN mg/dL 13 13   CREATININE mg/dL 0.95 0.88   SODIUM mmol/L 140 136   POTASSIUM mmol/L 4.3 3.8   CHLORIDE mmol/L 102 99   CO2 mmol/L 24 26.2   CALCIUM mg/dL 9.3 9.4   TOTAL PROTEIN g/dL  --  7.4   ALBUMIN g/dL 4.0 4.1   ALT (SGPT) IU/L 22 32   AST (SGOT) IU/L 19 19   ALK PHOS IU/L 95 102   BILIRUBIN mg/dL 0.3 0.4   GLOBULIN gm/dL  --  3.3   A/G RATIO g/dL  --  1.2   BUN / CREAT RATIO  14 14.8   ANION GAP mmol/L  --  10.8   EGFR mL/min/1.73  --  79.7     Lab Results - Last 18 Months   Lab Units 06/14/24  0839 02/19/24  0902   CHOLESTEROL mg/dL  --  224*   TRIGLYCERIDES mg/dL 224* 203*   HDL CHOL mg/dL 43 44   LDL CHOL mg/dL 102* 143*   VLDL CHOL mg/dL  --  37   VLDL CHOLESTEROL CORA mg/dL 38  --    LDL/HDL RATIO ratio 2.4 3.17     Lab Results - Last 18 Months   Lab Units 06/14/24  0839 02/19/24  0902   TSH uIU/mL 2.760 2.410         ECG 12 Lead    Date/Time: 1/22/2025 10:48 AM  Performed by: Carmen Horn MD    Authorized by: Carmen Horn MD  Comparison: compared with previous ECG   Similar to previous ECG  Rhythm:  sinus rhythm    Clinical impression: normal ECG        Assessment:       Diagnosis Plan   1. Mixed hyperlipidemia        2. Prediabetes          Plan:       1.  Dyspnea on exertion.  Resolved  2.  Elevated blood pressure.  Controlled  3.  Hyperlipidemia.  Poorly controlled and now on statin therapy  4.  Atrial tachycardia, brief episodes in the past.  Minimally symptomatic at this time.  I suspect CPAP may need to be adjusted with recent weight loss.    5.  Obstructive sleep apnea, on CPAP therapy as above.  She will contact APRN Epley regarding this  6.  Edema.  Controlled on hydrochlorothiazide 3 times a week.    Time Spent: I spent 25 minutes caring for Mary on this date of service. This time includes time spent by me in the following activities: preparing for the visit, reviewing tests, obtaining and/or reviewing a separately obtained history, performing a medically appropriate examination and/or evaluation, counseling and educating the patient/family/caregiver, documenting information in the medical record, and independently interpreting results and communicating that information with the patient/family/caregiver.   I spent 1 minutes on the separately reported service of ECG. This time is not included in the time used to support the E/M service also reported today.        Your medication list            Accurate as of January 22, 2025 11:59 PM. If you have any questions, ask your nurse or doctor.                CONTINUE taking these medications        Instructions Last Dose Given Next Dose Due   atorvastatin 20 MG tablet  Commonly known as: LIPITOR      Take 1 tablet by mouth Every Night.       hydroCHLOROthiazide 12.5 MG capsule  Commonly known as: MICROZIDE      Take 1 capsule by mouth Daily.       ibuprofen 400 MG tablet  Commonly known as: ADVIL,MOTRIN      Take 1 tablet by mouth Every 6 (Six) Hours As Needed for Mild Pain.       ondansetron ODT 4 MG disintegrating tablet  Commonly known as: ZOFRAN-ODT       Place 1 tablet on the tongue Every 6 (Six) Hours As Needed for Nausea or Vomiting.       Tirzepatide 5 MG/0.5ML solution auto-injector      Inject 5 mg under the skin into the appropriate area as directed 1 (One) Time Per Week.                Patient is no longer taking -.  I corrected the med list to reflect this.  I did not stop these medications.      Dictated utilizing Dragon dictation

## 2025-01-28 ENCOUNTER — TELEPHONE (OUTPATIENT)
Dept: MAMMOGRAPHY | Facility: CLINIC | Age: 55
End: 2025-01-28
Payer: COMMERCIAL

## 2025-02-03 NOTE — TELEPHONE ENCOUNTER
Rx Refill Note  Requested Prescriptions     Pending Prescriptions Disp Refills    Tirzepatide 5 MG/0.5ML solution auto-injector 2 mL 0     Sig: Inject 5 mg under the skin into the appropriate area as directed 1 (One) Time Per Week.      Last office visit with prescribing clinician: 12/16/2024   Last telemedicine visit with prescribing clinician: Visit date not found   Next office visit with prescribing clinician: Visit date not found                         Would you like a call back once the refill request has been completed: [] Yes [] No    If the office needs to give you a call back, can they leave a voicemail: [] Yes [] No    Omar Zhou MA  02/03/25, 09:28 EST

## 2025-02-03 NOTE — TELEPHONE ENCOUNTER
Please schedule patient a follow-up visit in about 3 to 4 months and fasting lab  Prior to visit if possible and make sure she is doing well with Mounjaro I increased the dose to 1 mg  I will probably leave it there depending on how she is doing until I see her back to make sure if she is doing well please

## 2025-02-04 RX ORDER — DOXYCYCLINE 100 MG/1
100 CAPSULE ORAL 2 TIMES DAILY
Qty: 14 CAPSULE | Refills: 0 | Status: SHIPPED | OUTPATIENT
Start: 2025-02-04

## 2025-02-28 NOTE — PROGRESS NOTES
[facility]   Obstetrics and Gynecology   Routine Annual Visit    10/15/2021    Patient: Mary Banda          MR#:[id number]    History of Present Illness    Chief Complaint   Patient presents with   • Gynecologic Exam     NGYN - AE today, Nodule on labia, Last pap 16 (Dr. Tamayo), Mg 21   • Establish Care       51 y.o. female  who presents for annual exam.  She reports bumps on labia minora in last few weeks.  Noted while cleaning herself.  Nontender.  Not sure if they're still there.      She had ablation and tubal in .  Reports spotting after intercourse rarely but otherwise no bleeding.  She reports hot flashes and night sweats 2 to 3 years ago but none recently.  She is curious if she ever went through menopause.    Reports history of cyst in right breast and duct excision.  Sees high risk breast specialist and receives regular imaging, annually currently.  Last mammogram 2021.  Performs self breast exam at home.    Has not gotten Covid or flu vaccine.    Obstetric History:  OB History        5    Para   2    Term   2            AB   3    Living   2       SAB   2    IAB        Ectopic        Molar        Multiple        Live Births              Obstetric Comments   Took birth control for 5 years.             Menstrual History:     No LMP recorded (lmp unknown). Patient has had an ablation.       Sexual History:     Sexually active, declines STD screening    _______________________________________  Patient Active Problem List   Diagnosis   • Mixed anxiety depressive disorder   • Fatigue   • Loss of hair   • Herpes simplex type 1 infection   • Hyperpigmentation of skin   • Disc disorder of cervical region   • Radicular pain   • Tension headache   • Insomnia   • Pedal edema   • Chronic left-sided low back pain without sciatica   • Breast mass, right   • Menopausal symptoms   • Thumb pain, right   • Restless legs syndrome   • Precordial pain   • Palpitations    • Lymphocytosis     Past Medical History:   Diagnosis Date   • Abnormal CT of the chest 3/24/2019    Recheck CT chest 1/2020 with resolution of abnormality and no new abnormality   • Anxiety    • Arthritis    • Breast nodule     RIGHT   • DDD (degenerative disc disease), lumbar    • Gallstones    • H/O Anxiety    • Heart palpitations     AT TIMES   • IBS (irritable bowel syndrome)    • Low back pain    • PONV (postoperative nausea and vomiting)    • Seasonal allergies      Past Surgical History:   Procedure Laterality Date   • BREAST BIOPSY Right 8/9/2017    Procedure: RIGHT BREAST BIOPSY WITH NEEDLE LOCALIZATION ;  Surgeon: Enrico Emerson MD;  Location: [facility] OR;  Service:    • BREAST BIOPSY Right 03/2017    U/S guided bx- non-atypical ductal hyperplasia   • CHOLECYSTECTOMY  2002   • ENDOMETRIAL ABLATION W/ NOVASURE  2006   • OOPHORECTOMY Left 1994    Part of left ovary removed   • TUBAL ABDOMINAL LIGATION  1995     Social History     Tobacco Use   Smoking Status Current Every Day Smoker   • Packs/day: 0.50   • Years: 25.00   • Pack years: 12.50   • Types: Cigarettes   • Start date: 1991   Smokeless Tobacco Never Used   Tobacco Comment    Caffeine - 3-4 diet pepsi     Family History   Problem Relation Age of Onset   • Thyroid disease Mother    • Heart disease Mother    • Depression Mother    • Hypothyroidism Mother    • Arthritis Mother    • Stroke Father    • Hyperlipidemia Father    • Heart disease Father    • Diabetes Father    • Heart attack Father    • Arthritis Father    • Lupus Sister    • Heart disease Maternal Grandmother    • Alcohol abuse Maternal Grandmother    • Lung cancer Maternal Grandmother    • Stroke Maternal Grandmother    • Heart disease Maternal Grandfather    • Heart attack Maternal Grandfather    • Heart disease Paternal Grandmother    • Lung cancer Paternal Grandmother    • Aneurysm Sister    • Heart disease Sister    • Cerebral aneurysm Sister    • Depression Daughter    •  Hypertension Daughter    • Hypothyroidism Daughter    • Diabetes Paternal Aunt    • Diabetes Paternal Uncle    • Breast cancer Neg Hx      Prior to Admission medications    Medication Sig Start Date End Date Taking? Authorizing Provider   Biotin 1000 MCG tablet Take 1,000 mcg by mouth Daily.   Yes Elsa Roblero MD   cholecalciferol (VITAMIN D3) 25 MCG (1000 UT) tablet Take 1,000 Units by mouth Daily.   Yes Elsa Roblero MD   cyclobenzaprine (FLEXERIL) 5 MG tablet Take 1 tablet by mouth 3 (Three) Times a Day As Needed for Muscle Spasms. 12/11/18  Yes Rima Shahid APRN   fluticasone (FLONASE) 50 MCG/ACT nasal spray 2 sprays into the nostril(s) as directed by provider Daily. 12/11/18  Yes Rima Shahid APRN   hydroCHLOROthiazide (MICROZIDE) 12.5 MG capsule Take 1 capsule by mouth Every Morning.  Patient taking differently: Take 12.5 mg by mouth 2 (Two) Times a Day. 11/26/19  Yes Juan Holm MD   rOPINIRole (REQUIP) 1 MG tablet TAKE 1 TABLET EVERY NIGHT TAKE ONE HOUR BEFORE BEDTIME 11/23/20  Yes Katie Felipe PA   vitamin B-12 (CYANOCOBALAMIN) 1000 MCG tablet Take 1,000 mcg by mouth Daily.   Yes Elsa Roblero MD   doxycycline (VIBRAMYCIN) 100 MG capsule Take 1 capsule by mouth 2 (Two) Times a Day. 9/15/21   Katie Felipe PA   traZODone (DESYREL) 50 MG tablet Take 1 tablet by mouth Every Night. 7/3/21   Katie Felipe PA     ________________________________________    Current contraception: tubal ligation  History of abnormal Pap smear: yes - remote h/o  Family history of uterine or ovarian cancer: no  Family History of colon cancer/colon polyps: no  History of abnormal mammogram: yes - see above  History of abnormal lipids: no    The following portions of the patient's history were reviewed and updated as appropriate: allergies, current medications, past family history, past medical history, past social history, past surgical history and problem  "list.    Review of Systems   Genitourinary: Positive for genital sores.   All other systems reviewed and are negative.           Objective     /78   Ht 160 cm (63\")   Wt 94.3 kg (207 lb 12.8 oz)   LMP  (LMP Unknown)   Breastfeeding No   BMI 36.81 kg/m²    BP Readings from Last 3 Encounters:   10/15/21 136/78   09/27/21 110/70   09/15/21 114/62      Wt Readings from Last 3 Encounters:   10/15/21 94.3 kg (207 lb 12.8 oz)   09/27/21 93.1 kg (205 lb 4.8 oz)   09/21/21 93.4 kg (206 lb)        BMI: Estimated body mass index is 36.81 kg/m² as calculated from the following:    Height as of this encounter: 160 cm (63\").    Weight as of this encounter: 94.3 kg (207 lb 12.8 oz).    Physical Exam  Vitals and nursing note reviewed. Exam conducted with a chaperone present.   Constitutional:       General: She is not in acute distress.     Appearance: Normal appearance.   HENT:      Head: Normocephalic and atraumatic.   Eyes:      Extraocular Movements: Extraocular movements intact.   Cardiovascular:      Rate and Rhythm: Normal rate and regular rhythm.      Pulses: Normal pulses.      Heart sounds: No murmur heard.      Pulmonary:      Effort: Pulmonary effort is normal. No respiratory distress.      Breath sounds: Normal breath sounds.   Chest:   Breasts:      Right: Normal. No mass, nipple discharge, skin change, tenderness or axillary adenopathy.      Left: Normal. No mass, nipple discharge, skin change, tenderness or axillary adenopathy.       Abdominal:      General: There is no distension.      Palpations: Abdomen is soft. There is no mass.      Tenderness: There is no abdominal tenderness.   Genitourinary:     General: Normal vulva.      Labia:         Right: No rash or lesion.         Left: Lesion (2cm epidermal inclusion cyst on lateral aspect of left labia minora, no signs of infection) present. No rash.       Urethra: No prolapse, urethral swelling or urethral lesion.      Vagina: Normal.      Cervix: " Normal.      Uterus: Normal.       Adnexa: Right adnexa normal and left adnexa normal.      Comments: Bladder: no masses or tenderness  Perineum/Anus: no masses, lesions, or skin changes  Musculoskeletal:         General: No swelling. Normal range of motion.      Cervical back: Normal range of motion.   Lymphadenopathy:      Upper Body:      Right upper body: No axillary adenopathy.      Left upper body: No axillary adenopathy.   Skin:     General: Skin is warm and dry.   Neurological:      General: No focal deficit present.      Mental Status: She is alert and oriented to person, place, and time.   Psychiatric:         Mood and Affect: Mood normal.         Behavior: Behavior normal.         As part of wellness and prevention, the following topics were discussed with the patient:  Encouraged self breast exam  Physical activity and regular exercised encouraged.   Injury prevention discussed.  Healthy weight discussed.  Nutrition discussed.  Sexual behavior/safe practices discussed.   Sexual transmitted disease prevention   Contraception discussed.   Mental health discussed.   Vaccinations/immunizations addressed.         Patient is a smoker.      Assessment:  Diagnoses and all orders for this visit:    1. Well woman exam with routine gynecological exam (Primary)  -     IGP, Aptima HPV, Rfx 16 / 18,45  -Breast and pelvic exam normal  -Pap today, remote history of abnormal Pap  -Mammogram per high , up-to-date  -Discussed initiation of colonoscopy next year  -Encouraged COVID and flu vaccine  -Declined STD screening    2. Encounter to establish care    3. Screening for cervical cancer  -     IGP, Aptima HPV, Rfx 16 / 18,45    4. Epidermal inclusion cyst  -2 cm EIC on left labia minora  -Discussed there is no inherent issue with EIC but if it is bothersome to her, we could incise and drain in office.  She declined for now but will let us know if that is an issue.    5. Menopausal symptoms  -Discussed that  FSH level may reflect menopausal status but may be in indeterminate range  -Discussed there is no clinical value to defining menopause if she is not having any issues  -Patient declined FSH for now        Plan:  Return in about 1 year (around 10/15/2022) for Annual physical.      Sherrie Rios MD  10/15/2021 15:46 EDT   There are no Wet Read(s) to document.

## 2025-03-25 ENCOUNTER — TRANSCRIBE ORDERS (OUTPATIENT)
Dept: ADMINISTRATIVE | Facility: HOSPITAL | Age: 55
End: 2025-03-25
Payer: COMMERCIAL

## 2025-03-25 ENCOUNTER — OFFICE VISIT (OUTPATIENT)
Dept: FAMILY MEDICINE CLINIC | Facility: CLINIC | Age: 55
End: 2025-03-25
Payer: COMMERCIAL

## 2025-03-25 VITALS
OXYGEN SATURATION: 97 % | RESPIRATION RATE: 18 BRPM | HEART RATE: 71 BPM | SYSTOLIC BLOOD PRESSURE: 120 MMHG | BODY MASS INDEX: 31.71 KG/M2 | HEIGHT: 63 IN | DIASTOLIC BLOOD PRESSURE: 70 MMHG | WEIGHT: 179 LBS

## 2025-03-25 DIAGNOSIS — E11.65 TYPE 2 DIABETES MELLITUS WITH HYPERGLYCEMIA, WITHOUT LONG-TERM CURRENT USE OF INSULIN: Primary | ICD-10-CM

## 2025-03-25 DIAGNOSIS — E53.8 BIOTIN DEFICIENCY DISEASE: ICD-10-CM

## 2025-03-25 DIAGNOSIS — M25.50 ARTHRALGIA, UNSPECIFIED JOINT: ICD-10-CM

## 2025-03-25 DIAGNOSIS — E61.1 IRON DEFICIENCY: ICD-10-CM

## 2025-03-25 DIAGNOSIS — M35.00 SJOGREN'S SYNDROME, WITH UNSPECIFIED ORGAN INVOLVEMENT: ICD-10-CM

## 2025-03-25 DIAGNOSIS — E55.9 VITAMIN D DEFICIENCY: ICD-10-CM

## 2025-03-25 DIAGNOSIS — E53.8 B12 DEFICIENCY: ICD-10-CM

## 2025-03-25 DIAGNOSIS — Z12.31 SCREENING MAMMOGRAM, ENCOUNTER FOR: Primary | ICD-10-CM

## 2025-03-25 DIAGNOSIS — E56.9 VITAMIN DEFICIENCY: ICD-10-CM

## 2025-03-25 RX ORDER — SENNOSIDES 8.6 MG
CAPSULE ORAL 2 TIMES DAILY
COMMUNITY

## 2025-03-25 NOTE — PATIENT INSTRUCTIONS
Discharge instructions continue the great changes you are making, slow steady changes,  Some light workouts during this, always hydrate well,    Okay to increase Mounjaro up to 10 mg we should avoid side effects, move it up as  Your progress mandates,  Update me next month, but I will give you refills if you want to continue the same or call me if I need to move it up  Outpatient labs soon,  Take calcium such as Caltrate with vitamin D  500 mg 2 daily walking daily to strengthen joints,  Will be proactive to prevent any risk of osteoporosis while taking these medications, consider MiraLAX      Every other day as an add-on or as I replacement daily MiraLAX to senna    Let me know if there is anything I can do for you keep up the good work

## 2025-03-25 NOTE — PROGRESS NOTES
"Chief Complaint  Diabetes (1 mon f/u on meds )    Subjective        Mary Banda presents to [facility]  History of Present Illness  Very pleasant patient here today follow-up diabetes mellitus type 2, she recently started on Mounjaro, and she is doing guillaume, she feels like it is lowering her sugar  Her diabetes has been controlled and improving, she utilizing Mounjaro to improve metabolically, and she is improved her diet, it is helped with her appetite, and she is down substantial by way  She was 200 for December 16, that would be a 25 pound decrease which is excellent  She is feeling better, she would like to be between 140 pounds-150 pounds ideally,  She would like to go ahead and move the medication Mounjaro up to 10 mg think she can do well with this, she has had no increased nausea vomiting or other issues she normally goes a bowel movement once daily although she has been going every 3 days however no increased abdominal fullness or other issues taking senna      Diabetes        Objective   Vital Signs:  /70   Pulse 71   Resp 18   Ht 160 cm (63\")   Wt 81.2 kg (179 lb)   SpO2 97%   BMI 31.71 kg/m²   Estimated body mass index is 31.71 kg/m² as calculated from the following:    Height as of this encounter: 160 cm (63\").    Weight as of this encounter: 81.2 kg (179 lb).            Physical Exam  Vitals reviewed.   Constitutional:       Appearance: Normal appearance. She is well-developed.      Comments: Appears well pleasant appears healthier overall weight loss apparent           HENT:      Head: Normocephalic.      Nose: Nose normal.   Eyes:      General: No scleral icterus.     Conjunctiva/sclera: Conjunctivae normal.      Pupils: Pupils are equal, round, and reactive to light.   Neck:      Thyroid: No thyromegaly.      Vascular: No JVD.   Cardiovascular:      Rate and Rhythm: Normal rate and regular rhythm.      Heart sounds: Normal heart sounds. No murmur heard.   "   No friction rub. No gallop.   Pulmonary:      Effort: Pulmonary effort is normal. No respiratory distress.      Breath sounds: Normal breath sounds. No stridor. No wheezing or rales.   Abdominal:      General: Bowel sounds are normal. There is no distension.      Palpations: Abdomen is soft. There is no mass.      Tenderness: There is no abdominal tenderness.      Hernia: No hernia is present.      Comments: No hepatosplenomegaly, no ascites,   Musculoskeletal:         General: No tenderness.      Cervical back: Neck supple.   Lymphadenopathy:      Cervical: No cervical adenopathy.   Skin:     General: Skin is warm and dry.      Findings: No erythema or rash.   Neurological:      General: No focal deficit present.      Mental Status: She is alert and oriented to person, place, and time. Mental status is at baseline.      Deep Tendon Reflexes: Reflexes are normal and symmetric.   Psychiatric:         Mood and Affect: Mood normal.         Behavior: Behavior normal.         Thought Content: Thought content normal.         Judgment: Judgment normal.        Result Review :                Assessment and Plan   Diagnoses and all orders for this visit:    1. Type 2 diabetes mellitus with hyperglycemia, without long-term current use of insulin (Primary)  -     Vitamin B7 (Biotin); Future  -     Vitamin D,25-Hydroxy; Future  -     Hemoglobin A1c; Future  -     CBC & Differential; Future  -     Comprehensive Metabolic Panel; Future  -     Lipid Panel With LDL / HDL Ratio; Future  -     TSH Rfx On Abnormal To Free T4; Future  -     Urinalysis With Microscopic If Indicated (No Culture) - Urine, Clean Catch; Future  -     Vitamin B12 & Folate; Future  -     Celiac Disease Antibody Screen; Future    2. Vitamin deficiency  -     Vitamin B7 (Biotin); Future  -     Vitamin D,25-Hydroxy; Future  -     Hemoglobin A1c; Future  -     CBC & Differential; Future  -     Comprehensive Metabolic Panel; Future  -     Lipid Panel With LDL /  HDL Ratio; Future  -     TSH Rfx On Abnormal To Free T4; Future  -     Urinalysis With Microscopic If Indicated (No Culture) - Urine, Clean Catch; Future  -     Vitamin B12 & Folate; Future  -     Celiac Disease Antibody Screen; Future    3. Biotin deficiency disease  -     Vitamin B7 (Biotin); Future  -     Vitamin D,25-Hydroxy; Future  -     Hemoglobin A1c; Future  -     CBC & Differential; Future  -     Comprehensive Metabolic Panel; Future  -     Lipid Panel With LDL / HDL Ratio; Future  -     TSH Rfx On Abnormal To Free T4; Future  -     Urinalysis With Microscopic If Indicated (No Culture) - Urine, Clean Catch; Future  -     Vitamin B12 & Folate; Future  -     Celiac Disease Antibody Screen; Future    4. Iron deficiency  -     Vitamin B7 (Biotin); Future  -     Vitamin D,25-Hydroxy; Future  -     Hemoglobin A1c; Future  -     CBC & Differential; Future  -     Comprehensive Metabolic Panel; Future  -     Lipid Panel With LDL / HDL Ratio; Future  -     TSH Rfx On Abnormal To Free T4; Future  -     Urinalysis With Microscopic If Indicated (No Culture) - Urine, Clean Catch; Future  -     Vitamin B12 & Folate; Future  -     Celiac Disease Antibody Screen; Future    5. B12 deficiency  -     Vitamin B7 (Biotin); Future  -     Vitamin D,25-Hydroxy; Future  -     Hemoglobin A1c; Future  -     CBC & Differential; Future  -     Comprehensive Metabolic Panel; Future  -     Lipid Panel With LDL / HDL Ratio; Future  -     TSH Rfx On Abnormal To Free T4; Future  -     Urinalysis With Microscopic If Indicated (No Culture) - Urine, Clean Catch; Future  -     Vitamin B12 & Folate; Future  -     Celiac Disease Antibody Screen; Future    6. Vitamin D deficiency  -     Vitamin B7 (Biotin); Future  -     Vitamin D,25-Hydroxy; Future  -     Hemoglobin A1c; Future  -     CBC & Differential; Future  -     Comprehensive Metabolic Panel; Future  -     Lipid Panel With LDL / HDL Ratio; Future  -     TSH Rfx On Abnormal To Free T4;  Future  -     Urinalysis With Microscopic If Indicated (No Culture) - Urine, Clean Catch; Future  -     Vitamin B12 & Folate; Future  -     Celiac Disease Antibody Screen; Future    7. Sjogren's syndrome, with unspecified organ involvement  -     Vitamin B7 (Biotin); Future  -     Vitamin D,25-Hydroxy; Future  -     Hemoglobin A1c; Future  -     CBC & Differential; Future  -     Comprehensive Metabolic Panel; Future  -     Lipid Panel With LDL / HDL Ratio; Future  -     TSH Rfx On Abnormal To Free T4; Future  -     Urinalysis With Microscopic If Indicated (No Culture) - Urine, Clean Catch; Future  -     Vitamin B12 & Folate; Future  -     Celiac Disease Antibody Screen; Future    8. Arthralgia, unspecified joint  -     Vitamin B7 (Biotin); Future  -     Vitamin D,25-Hydroxy; Future  -     Hemoglobin A1c; Future  -     CBC & Differential; Future  -     Comprehensive Metabolic Panel; Future  -     Lipid Panel With LDL / HDL Ratio; Future  -     TSH Rfx On Abnormal To Free T4; Future  -     Urinalysis With Microscopic If Indicated (No Culture) - Urine, Clean Catch; Future  -     Vitamin B12 & Folate; Future  -     Celiac Disease Antibody Screen; Future    Other orders  -     Tirzepatide 10 MG/0.5ML solution auto-injector; Inject 10 mg under the skin into the appropriate area as directed 1 (One) Time Per Week.  Dispense: 2 mL; Refill: 2             Follow Up   No follow-ups on file.  Patient was given instructions and counseling regarding her condition or for health maintenance advice. Please see specific information pulled into the AVS if appropriate.     Patient Instructions   Discharge instructions continue the great changes you are making, slow steady changes,  Some light workouts during this, always hydrate well,    Okay to increase Mounjaro up to 10 mg we should avoid side effects, move it up as  Your progress mandates,  Update me next month, but I will give you refills if you want to continue the same or call me  if I need to move it up  Outpatient labs soon,  Take calcium such as Caltrate with vitamin D  500 mg 2 daily walking daily to strengthen joints,  Will be proactive to prevent any risk of osteoporosis while taking these medications, consider MiraLAX      Every other day as an add-on or as I replacement daily MiraLAX to senna    Let me know if there is anything I can do for you keep up the good work         Education teaching office visit 30 minutes  Discussed best dose of Mounjaro she would like to go ahead and increase it to 10, likely we can stay there for a bit she will let me know new prescription refills given, continue present plan, labs,  Caltrate D2 tablets daily with vitamin D

## 2025-05-07 ENCOUNTER — HOSPITAL ENCOUNTER (OUTPATIENT)
Dept: MAMMOGRAPHY | Facility: HOSPITAL | Age: 55
Discharge: HOME | End: 2025-05-07
Payer: COMMERCIAL

## 2025-05-07 DIAGNOSIS — R92.8 ABNORMAL MAMMOGRAM: Primary | ICD-10-CM

## 2025-05-07 DIAGNOSIS — Z12.31 SCREENING MAMMOGRAM, ENCOUNTER FOR: ICD-10-CM

## 2025-05-27 ENCOUNTER — HOSPITAL ENCOUNTER (OUTPATIENT)
Dept: ULTRASOUND IMAGING | Facility: HOSPITAL | Age: 55
Discharge: HOME | End: 2025-05-27
Payer: COMMERCIAL

## 2025-05-27 ENCOUNTER — HOSPITAL ENCOUNTER (OUTPATIENT)
Dept: MAMMOGRAPHY | Facility: HOSPITAL | Age: 55
Discharge: HOME | End: 2025-05-27
Payer: COMMERCIAL

## 2025-05-27 DIAGNOSIS — R92.8 ABNORMAL MAMMOGRAM: ICD-10-CM

## 2025-05-27 PROCEDURE — 77066 DX MAMMO INCL CAD BI: CPT

## 2025-05-27 PROCEDURE — G0279 TOMOSYNTHESIS, MAMMO: HCPCS

## 2025-05-27 PROCEDURE — 76642 ULTRASOUND BREAST LIMITED: CPT

## 2025-06-16 NOTE — PROGRESS NOTES
[facility]     Referring Provider:      Chief complaint: High risk     HPI: Patient presenting to the office today for routine follow-up.  She was previously being followed by Dr. Emerson in the high-risk clinic and since his [facility] her care has been transferred to me.      I personally reviewed her records and summarized her relevant breast history/imaging:    10/24/2024 bilateral breast MRI at [facility]  FINDINGS: There is scattered fibroglandular tissue. There is mild  background parenchymal enhancement.  RIGHT BREAST:    No suspicious enhancing mass or area of non-mass enhancement is  identified.  The visualized axilla is within normal limits.  LEFT BREAST:    No suspicious enhancing mass or area of non-mass enhancement is  identified.  The visualized axilla is within normal limits.  EXTRAMAMMARY FINDINGS:  There are no pathologically enlarged internal mammary chain lymph nodes  on either side.    IMPRESSION AND RECOMMENDATION:  No MRI evidence of malignancy in either breast. Further management of  the patient's right breast symptoms should be based on clinical  assessment. Diagnostic mammogram and ultrasound should be considered.  Otherwise, recommend annual screening mammogram in May 2025.  BI-RADS Category 1    5/27/2025 bilateral diagnostic mammogram right Limited breast ultrasound at [facility]  The breasts are heterogeneously dense, which may obscure small masses.  Benign-appearing postsurgical changes in the right breast.  Benign-appearing right breast mass, decreased in size from 2024. There  are benign-appearing calcifications. There are no suspicious masses,  calcifications, or areas of architectural distortion.  ULTRASOUND:  Targeted sonographic evaluation of the retroareolar right breast was  performed. Mildly dilated ducts are identified without a discrete  intraductal filling defect. No suspicious cystic or solid mass is  identified.  IMPRESSION:  No evidence of malignancy in either breast.  Recommend contrast-enhanced  breast MRI to further evaluate the patient's right nipple discharge.   BI-RADS Category 2    She has a personal history of 2017   right breast biopsy ductal hyperplasia of the usual type    She has a family history of breast cancer in her sister dx age 30s (negative genetics).  She denies any family history of ovarian cancer.       Review of Systems - Oncology    Medications:    Current Outpatient Medications:     atorvastatin (LIPITOR) 20 MG tablet, Take 1 tablet by mouth Every Night., Disp: 90 tablet, Rfl: 3    doxycycline (VIBRAMYCIN) 100 MG capsule, Take 1 capsule by mouth 2 (Two) Times a Day. Go to the emergency room if abscess, fever increased pain or worse, Disp: 14 capsule, Rfl: 0    hydroCHLOROthiazide (MICROZIDE) 12.5 MG capsule, Take 1 capsule by mouth Daily., Disp: 90 capsule, Rfl: 3    ibuprofen (ADVIL,MOTRIN) 400 MG tablet, Take 1 tablet by mouth Every 6 (Six) Hours As Needed for Mild Pain., Disp: , Rfl:     ondansetron ODT (ZOFRAN-ODT) 4 MG disintegrating tablet, Place 1 tablet on the tongue Every 6 (Six) Hours As Needed for Nausea or Vomiting., Disp: 20 tablet, Rfl: 1    Sennosides (Senna) 8.6 MG capsule, Take  by mouth 2 (Two) Times a Day., Disp: , Rfl:     Tirzepatide 10 MG/0.5ML solution auto-injector, Inject 10 mg under the skin into the appropriate area as directed 1 (One) Time Per Week., Disp: 2 mL, Rfl: 2    Allergies:  Allergies   Allergen Reactions    Metformin GI Intolerance     Severe: Gas and discomfort diarrhea cannot take low-dose 1 dosed at this    Sulfa Antibiotics Rash       Medical history:  Past Medical History:   Diagnosis Date    Abnormal CT of the chest 03/24/2019    Recheck CT chest 1/2020 with resolution of abnormality and no new abnormality    Abnormal Pap smear of cervix 1994    Cryo procedure completed    Allergic Years    Sulfa    Anxiety     Arthritis     Breast nodule     RIGHT    Cholesterol blood reduced     Recent labs.    COVID 12/21/2021     DDD (degenerative disc disease), lumbar     Diabetes mellitus 2023    Prediabetic    Gallstones     H/O Anxiety     Heart palpitations     AT TIMES    Hyperlipidemia 2023    IBS (irritable bowel syndrome)     Low back pain     Osteoarthritis     Ovarian cyst Multiple    PONV (postoperative nausea and vomiting)     Seasonal allergies     Sleep apnea     Urinary tract infection Multiple    Visual impairment Couple of months    Vision getting worse, eyelids drooping       Surgical History:  Past Surgical History:   Procedure Laterality Date    BREAST BIOPSY Right 08/09/2017    Procedure: RIGHT BREAST BIOPSY WITH NEEDLE LOCALIZATION ;  Surgeon: Enrico Emerson MD;  Location: [facility] MAIN OR;  Service:     BREAST BIOPSY Right 03/2017    U/S guided bx- non-atypical ductal hyperplasia    BREAST CYST ASPIRATION      CHOLECYSTECTOMY  2002    D & C WITH SUCTION  1994    ENDOMETRIAL ABLATION W/ NOVASURE  2006    OOPHORECTOMY Left 1994    Part of left ovary removed    OVARIAN CYST SURGERY  1994    TUBAL ABDOMINAL LIGATION  1995    WISDOM TOOTH EXTRACTION  Unsure       Family History:  Family History   Problem Relation Age of Onset    Stroke Father     Hyperlipidemia Father     Heart disease Father     Diabetes Father     Heart attack Father     Arthritis Father     Hypertension Father     Arrhythmia Father         Afib    Thyroid disease Mother     Heart disease Mother     Depression Mother     Hypothyroidism Mother     Arthritis Mother     Alcohol abuse Mother     Arrhythmia Mother         Afib    Lupus Sister     Aneurysm Sister     Heart disease Sister     Cerebral aneurysm Sister     Early death Sister         Brain stem aneurysm    Stroke Sister         Cva brainstem age 23    Arrhythmia Sister         PVT    Breast cancer Sister     Depression Daughter     Hypertension Daughter     Hypothyroidism Daughter     Heart disease Paternal Grandmother     Lung cancer Paternal Grandmother     Heart disease Maternal  Grandmother     Alcohol abuse Maternal Grandmother     Lung cancer Maternal Grandmother     Stroke Maternal Grandmother     Heart disease Maternal Grandfather     Heart attack Maternal Grandfather     Diabetes Paternal Aunt     Diabetes Paternal Uncle     Breast cancer Other     Breast cancer Sister         DCIS    Ovarian cancer Neg Hx     Uterine cancer Neg Hx     Colon cancer Neg Hx        Social History:   Social History     Socioeconomic History    Marital status:      Spouse name: Ty    Years of education: College   Tobacco Use    Smoking status: Every Day     Current packs/day: 1.00     Average packs/day: 1 pack/day for 34.5 years (34.5 ttl pk-yrs)     Types: Cigarettes     Start date: 1991     Passive exposure: Current    Smokeless tobacco: Never    Tobacco comments:     Caffeine - 3-4 diet pepsi   Vaping Use    Vaping status: Never Used   Substance and Sexual Activity    Alcohol use: Not Currently     Comment: social    Drug use: Never    Sexual activity: Yes     Partners: Male     Birth control/protection: Tubal ligation, Surgical     Comment: Tubal ligation     Patient drinks 3 servings of caffeine per day.       GYNECOLOGIC HISTORY:   . P: 2. AB: 2.  Last menstrual period: 53  Age at menarche: 11  Age at first childbirth: 23  Lactation/How long: N/A  Age at menopause: 53  Total years of oral contraceptive use: 3 years  Total years of hormone replacement therapy: N/A      Physical Exam  There were no vitals filed for this visit.  ECOG 0 - Asymptomatic  General: NAD, well appearing  Psych: a&o x 3, normal mood and affect  Eyes: EOMI, no scleral icterus  ENMT: neck supple without masses or thyromegaly, mucus membranes moist  Resp: normal effort, CTAB  CV: RRR, no murmurs, no edema   GI: soft, NT, ND  MSK: normal gait, normal ROM in bilateral shoulders  Lymph nodes: no cervical, supraclavicular or axillary lymphadenopathy  Breast: symmetric, medium  Right: No visible abnormalities on  inspection while seated, with arms raised or hands on hips. No masses, skin changes, or nipple abnormalities.  Left: No visible abnormalities on inspection while seated, with arms raised or hands on hips. No masses, skin changes, or nipple abnormalities.      Assessment:    Family history of breast cancer  High risk for breast cancer-according to TC 8 she has a 22.7% lifetime risk of breast cancer  Dense breast    Discussion:  We discussed management options for individuals who are at increased risk (>20% lifetime risk):  1) High risk screening - Annual mammogram and annual breast MRI, alternating one test every 6 months, biannual clinical exam and monthly self breast exam. She meets criteria for high risk screening.  2) Chemoprevention with Tamoxifen, Raloxifene or Exemestane. These may reduce risk up to 50%. I reviewed that these particular medications are not without risks and the risk/benefit ratio must be considered carefully. She is not interested in this currently.  3) Risk reducing surgery such as prophylactic mastectomy  which may reduce risk by 90-95%. We discussed that this is a relatively radical strategy and is generally reserved for individuals with a known genetic mutation predisposing them to an increased risk (~50% risk) of breast cancer. She does not meet criteria for risk reducing surgery.   4) I discussed the importance of exercise and weight management as part of a risk reducing strategy, since increased BMI is associated with an increased risk of breast cancer.   She does not want a referral to med onc at this time      Breast density describes how the breasts look on a mammogram.  Breast and connective tissue are denser than fat and this difference shows up on the mammogram.  Young women often have dense breasts.  As we age, breast become less dense.  Dense breast can make it harder to find breast cancer on the mammogram.  Women with high breast density have an increased risk of breast  cancer.  Educational materials regarding breast density were given and reviewed.  Tomosynthesis imaging will be completed with next screening study.06/17/2025    Plan:  Breast MRI and exam in Dec   Mammo and exam in OBGYN        HOMERO Arthur    I have spent 35 mins in face to face time with the patient and in chart review.    CC:  No ref. provider found  Epley, James, APRN    EMR Dragon/transcription disclaimer:  Dictated using Dragon dictation

## 2025-06-17 ENCOUNTER — OFFICE VISIT (OUTPATIENT)
Dept: SURGERY | Facility: CLINIC | Age: 55
End: 2025-06-17
Payer: COMMERCIAL

## 2025-06-17 VITALS
OXYGEN SATURATION: 99 % | DIASTOLIC BLOOD PRESSURE: 82 MMHG | BODY MASS INDEX: 31.71 KG/M2 | SYSTOLIC BLOOD PRESSURE: 122 MMHG | HEART RATE: 89 BPM | WEIGHT: 179 LBS | HEIGHT: 63 IN

## 2025-06-17 DIAGNOSIS — Z91.89 AT HIGH RISK FOR BREAST CANCER: Primary | ICD-10-CM

## 2025-06-17 RX ORDER — TIRZEPATIDE 10 MG/.5ML
INJECTION, SOLUTION SUBCUTANEOUS
Qty: 2 ML | Refills: 2 | Status: SHIPPED | OUTPATIENT
Start: 2025-06-17

## 2025-06-17 NOTE — TELEPHONE ENCOUNTER
Rx Refill Note  Requested Prescriptions     Pending Prescriptions Disp Refills    Mounjaro 10 MG/0.5ML solution auto-injector [Pharmacy Med Name: Mounjaro 10 mg/0.5 mL subcutaneous pen injector] 2 mL 2     Sig: Inject 10 mg under the skin into the appropriate area as directed 1 (One) Time Per Week.      Last office visit with prescribing clinician: 3/25/2025   Last telemedicine visit with prescribing clinician: Visit date not found   Next office visit with prescribing clinician: Visit date not found                         Would you like a call back once the refill request has been completed: [] Yes [] No    If the office needs to give you a call back, can they leave a voicemail: [] Yes [] No    Nancy Keller MA  06/17/25, 08:51 EDT

## 2025-06-18 NOTE — TELEPHONE ENCOUNTER
Please have pt come in for physical/follow up in 3m I am following up w/ héctor on labs she just had some done

## 2025-07-18 RX ORDER — TIRZEPATIDE 12.5 MG/.5ML
12.5 INJECTION, SOLUTION SUBCUTANEOUS WEEKLY
Qty: 2 ML | Refills: 2 | Status: SHIPPED | OUTPATIENT
Start: 2025-07-18

## (undated) DEVICE — NDL HYPO ECLPS SFTY 22G 1 1/2IN

## (undated) DEVICE — ANTIBACTERIAL UNDYED BRAIDED (POLYGLACTIN 910), SYNTHETIC ABSORBABLE SUTURE: Brand: COATED VICRYL

## (undated) DEVICE — IRRIGATOR BULB ASEPTO 60CC STRL

## (undated) DEVICE — SPNG GZ WOVN 4X4IN 12PLY 10/BX STRL

## (undated) DEVICE — 3M™ STERI-STRIP™ REINFORCED ADHESIVE SKIN CLOSURES, R1547, 1/2 IN X 4 IN (12 MM X 100 MM), 6 STRIPS/ENVELOPE: Brand: 3M™ STERI-STRIP™

## (undated) DEVICE — LOU MINOR PROCEDURE: Brand: MEDLINE INDUSTRIES, INC.

## (undated) DEVICE — 3M™ STERI-STRIP™ COMPOUND BENZOIN TINCTURE 40 BAGS/CARTON 4 CARTONS/CASE C1544: Brand: 3M™ STERI-STRIP™

## (undated) DEVICE — ELECTRD BLD EDGE/INSUL1P 2.4X5.1MM STRL

## (undated) DEVICE — ENCORE® LATEX ORTHO SIZE 8, STERILE LATEX POWDER-FREE SURGICAL GLOVE: Brand: ENCORE

## (undated) DEVICE — SUT SILK 2/0 FS BLK 18IN 685G

## (undated) DEVICE — SKIN PREP TRAY W/CHG: Brand: MEDLINE INDUSTRIES, INC.

## (undated) DEVICE — DRSNG SURESITE WNDW 4X4.5